# Patient Record
Sex: MALE | Race: WHITE | Employment: PART TIME | URBAN - METROPOLITAN AREA
[De-identification: names, ages, dates, MRNs, and addresses within clinical notes are randomized per-mention and may not be internally consistent; named-entity substitution may affect disease eponyms.]

---

## 2013-10-21 LAB — COLONOSCOPY, EXTERNAL: NORMAL

## 2017-02-06 ENCOUNTER — OFFICE VISIT (OUTPATIENT)
Dept: INTERNAL MEDICINE CLINIC | Age: 67
End: 2017-02-06

## 2017-02-06 VITALS
DIASTOLIC BLOOD PRESSURE: 90 MMHG | OXYGEN SATURATION: 96 % | BODY MASS INDEX: 30.7 KG/M2 | TEMPERATURE: 97.9 F | RESPIRATION RATE: 18 BRPM | SYSTOLIC BLOOD PRESSURE: 162 MMHG | WEIGHT: 195.6 LBS | HEIGHT: 67 IN | HEART RATE: 62 BPM

## 2017-02-06 DIAGNOSIS — I83.93 VARICOSE VEINS OF BOTH LOWER EXTREMITIES: ICD-10-CM

## 2017-02-06 DIAGNOSIS — S91.001A ANKLE WOUND, RIGHT, INITIAL ENCOUNTER: Primary | ICD-10-CM

## 2017-02-06 DIAGNOSIS — I10 ESSENTIAL HYPERTENSION: ICD-10-CM

## 2017-02-06 RX ORDER — MUPIROCIN 20 MG/G
OINTMENT TOPICAL DAILY
Qty: 22 G | Refills: 0 | Status: SHIPPED | OUTPATIENT
Start: 2017-02-06 | End: 2017-03-03 | Stop reason: ALTCHOICE

## 2017-02-06 RX ORDER — DOXYCYCLINE 100 MG/1
100 TABLET ORAL 2 TIMES DAILY
Qty: 14 TAB | Refills: 0 | Status: SHIPPED | OUTPATIENT
Start: 2017-02-06 | End: 2017-02-13

## 2017-02-06 NOTE — PROGRESS NOTES
HISTORY OF PRESENT ILLNESS  Priscilla Eller is a 74 y.o. male. Ankle Pain    The current episode started more than 1 week ago. The problem has not changed since onset. The pain is present in the right ankle. The quality of the pain is described as aching. The pain is at a severity of 3/10. Pertinent negatives include no numbness, full range of motion, no stiffness and no tingling. The symptoms are aggravated by standing. He has tried OTC ointments for the symptoms. The treatment provided mild relief. There has been no history of extremity trauma. Cardiovascular Review:  The patient has hypertension. Diet and Lifestyle: not attempting to follow a low sodium diet, had a lot of salty food during [employer] party yesterday  Home BP Monitoring: is well controlled at home, but not measured in the last week. Pertinent ROS: taking medications as instructed, no medication side effects noted, no TIA's, no chest pain on exertion, no dyspnea on exertion, no swelling of ankles. Review of Systems   Musculoskeletal: Negative for stiffness. Neurological: Negative for tingling and numbness. Patient Active Problem List    Diagnosis Date Noted    Advance directive discussed with patient 11/10/2016    OA (osteoarthritis) 08/19/2011    HTN (hypertension) 02/01/2011       Current Outpatient Prescriptions   Medication Sig Dispense Refill    doxycycline (ADOXA) 100 mg tablet Take 1 Tab by mouth two (2) times a day for 7 days. 14 Tab 0    mupirocin (BACTROBAN) 2 % ointment Apply  to affected area daily. 22 g 0    lisinopril (PRINIVIL, ZESTRIL) 5 mg tablet Take 1 Tab by mouth two (2) times a day. (Patient taking differently: Take 5 mg by mouth daily. ) 180 Tab 1    sildenafil citrate (VIAGRA) 100 mg tablet Take 1 Tab by mouth as needed. 10 Tab 5    cyanocobalamin (VITAMIN B12) 100 mcg tablet Take 100 mcg by mouth daily.  OTHER Osteomatric      multivitamin (ONE A DAY) tablet Take 1 Tab by mouth daily.       aspirin delayed-release 81 mg tablet Take 81 mg by mouth daily.  lisinopril (PRINIVIL, ZESTRIL) 10 mg tablet Take 0.5 Tabs by mouth two (2) times a day. 180 Tab 1       No Known Allergies   Visit Vitals    /90 (BP 1 Location: Right arm, BP Patient Position: Sitting)    Pulse 62    Temp 97.9 °F (36.6 °C) (Oral)    Resp 18    Ht 5' 6.5\" (1.689 m)    Wt 195 lb 9.6 oz (88.7 kg)    SpO2 96%    BMI 31.1 kg/m2       Physical Exam   Constitutional: He is oriented to person, place, and time. No distress. Neurological: He is alert and oriented to person, place, and time. Psychiatric: He has a normal mood and affect. RLE(shown above): Granulated ankle wound with ttp and mild erythema as depicted. BLE with varicose and spider veins. ASSESSMENT and PLAN  Joie Castleman was seen today for ankle pain. Diagnoses and all orders for this visit:    Ankle wound, right, initial encounter- poor circulation and local contributing to slow healing. Xray to r/o osteomyelitis Would benefit from Wound Care clinic evaluation to optimize underlying vein disease.   -     XR ANKLE RT MIN 3 V; Future  -     REFERRAL TO WOUND CARE  -     doxycycline (ADOXA) 100 mg tablet; Take 1 Tab by mouth two (2) times a day for 7 days.  -     mupirocin (BACTROBAN) 2 % ointment; Apply  to affected area daily. Varicose veins of both lower extremities  -     XR ANKLE RT MIN 3 V; Future    Essential hypertension- sBP elevated due to dietary indiscretion. Plans to resume pm dose of Lisinopril. Monitor BP at home. Red flags to warrant ER or earlier clinical evaluation reviewed. See AVS for full details of plan and patient discussion. Follow-up Disposition:  Return in about 2 weeks (around 2/20/2017) for Follow-up with Dr. Jessica Back, Hypertension. Medication risks/benefits/costs/interactions/alternatives discussed with patient.   Marilee Lissa  was given an after visit summary which includes diagnoses, current medications, & vitals. he expressed understanding with the diagnosis and plan.

## 2017-02-06 NOTE — MR AVS SNAPSHOT
Visit Information Date & Time Provider Department Dept. Phone Encounter #  
 2/6/2017 11:45 AM Madai Stover MD [address] [id number] Internal Medicine [phone] [id number] Follow-up Instructions Return in about 2 weeks (around 2/20/2017) for Follow-up with Dr. Tasia Griggs, Hypertension. Upcoming Health Maintenance Date Due ZOSTER VACCINE AGE 60> 3/23/2008 Pneumococcal 65+ Low/Medium Risk (2 of 2 - PCV13) 10/9/2014 GLAUCOMA SCREENING Q2Y 7/1/2016 INFLUENZA AGE 9 TO ADULT 8/1/2016 MEDICARE YEARLY EXAM 11/11/2017 DTaP/Tdap/Td series (2 - Td) 8/20/2022 COLONOSCOPY 10/22/2023 Allergies as of 2/6/2017  Review Complete On: 2/6/2017 By: Madai Stover MD  
 No Known Allergies Current Immunizations  Reviewed on 11/10/2016 Name Date Influenza High Dose Vaccine PF 11/9/2015, 10/9/2013 Influenza Vaccine 11/11/2014 Influenza Vaccine Whole 11/12/2012 Pneumococcal Polysaccharide (PPSV-23) 10/9/2013 TDAP Vaccine 8/20/2012 Not reviewed this visit You Were Diagnosed With   
  
 Codes Comments Ankle wound, right, initial encounter    -  Primary ICD-10-CM: S91.001A ICD-9-CM: 891.0 Varicose veins of both lower extremities     ICD-10-CM: I83.93 
ICD-9-CM: 454.9 Essential hypertension     ICD-10-CM: I10 
ICD-9-CM: 401.9 Vitals BP Pulse Temp Resp Height(growth percentile) Weight(growth percentile) 162/90 (BP 1 Location: Right arm, BP Patient Position: Sitting) 62 97.9 °F (36.6 °C) (Oral) 18 5' 6.5\" (1.689 m) 195 lb 9.6 oz (88.7 kg) SpO2 BMI Smoking Status 96% 31.1 kg/m2 Never Smoker Vitals History BMI and BSA Data Body Mass Index Body Surface Area  
 31.1 kg/m 2 2.04 m 2 Preferred Pharmacy Pharmacy Name Phone [facility] [place], [place] - [address] [place] [address] [phone] Your Updated Medication List  
  
   
 This list is accurate as of: 2/6/17 12:33 PM.  Always use your most recent med list.  
  
  
  
  
 aspirin delayed-release 81 mg tablet Take 81 mg by mouth daily. cyanocobalamin 100 mcg tablet Commonly known as:  VITAMIN B12 Take 100 mcg by mouth daily. doxycycline 100 mg tablet Commonly known as:  ADOXA Take 1 Tab by mouth two (2) times a day for 7 days. * lisinopril 10 mg tablet Commonly known as:  Kaylynn Fernando Take 0.5 Tabs by mouth two (2) times a day. * lisinopril 5 mg tablet Commonly known as:  Kaylynn Fernando Take 1 Tab by mouth two (2) times a day. multivitamin tablet Commonly known as:  ONE A DAY Take 1 Tab by mouth daily. mupirocin 2 % ointment Commonly known as:  [facility] Apply  to affected area daily. OTHER Osteomatric  
  
 sildenafil citrate 100 mg tablet Commonly known as:  VIAGRA Take 1 Tab by mouth as needed. * Notice: This list has 2 medication(s) that are the same as other medications prescribed for you. Read the directions carefully, and ask your doctor or other care provider to review them with you. Prescriptions Sent to Pharmacy Refills  
 doxycycline (ADOXA) 100 mg tablet 0 Sig: Take 1 Tab by mouth two (2) times a day for 7 days. Class: Normal  
 Pharmacy: [facility] [address] [place] [address] Ph #: [phone] Route: Oral  
 mupirocin (BACTROBAN) 2 % ointment 0 Sig: Apply  to affected area daily. Class: Normal  
 Pharmacy: [id number] [place] [address] Ph #: [phone] Route: Topical  
  
We Performed the Following REFERRAL TO WOUND CARE [[id number] Custom] Comments:  
 Please evaluate patient for non healing ankle wound Follow-up Instructions Return in about 2 weeks (around 2/20/2017) for Follow-up with Dr. Seymour Mast, Hypertension. To-Do List   
 02/06/2017   Imaging:  XR ANKLE RT MIN 3 V   
  
  
 Referral Information Referral ID Referred By Referred To  
  
 [id number] DENY, [address] Fabien Sharma MD   
   [address] [facility] [address] [place], [address] Phone: [phone] Fax: [phone] Visits Status Start Date End Date 1 New Request 2/6/17 2/6/18 If your referral has a status of pending review or denied, additional information will be sent to support the outcome of this decision. Patient Instructions It was a pleasure to see you! As discussed: 
 
I have ordered an xray to ensure the wound has not impacted the bone You will be called by the wound clinic to arrange an appointment For now follow the treatment discussed. Blood pressure - Your blood pressure was slightly high today  
- Since you told me your blood pressure is lower at home. Keep a log of your blood pressure every day. -Bring your blood pressure monitor to your next appointment.  
-Continue to follow a low salt/ low sodium diet (1500mg/ day) -If your blood pressure is too low (<90/60) or too high (>180/100) or you have any symptoms such as chest pain, dizziness, shortness of breath- seek immediate medical attention.  
- Take Tylenol instead of ibuprofen or other NSAIDs for pain Wound Care: After Your Visit Your Care Instructions Taking good care of your wound at home will help it heal quickly and reduce your chance of infection. The doctor has checked you carefully, but problems can develop later. If you notice any problems or new symptoms, get medical treatment right away. Follow-up care is a key part of your treatment and safety. Be sure to make and go to all appointments, and call your doctor if you are having problems. It's also a good idea to know your test results and keep a list of the medicines you take. How can you care for yourself at home?  
· Clean the area with soap and water 2 times a day unless your doctor gives you different instructions. Don't use hydrogen peroxide or alcohol, which can slow healing. ¨ You may cover the wound with a thin layer of antibiotic ointment, such as bacitracin, and a nonstick bandage. ¨ Apply more ointment and replace the bandage as needed. · Take pain medicines exactly as directed. Some pain is normal with a wound, but do not ignore pain that is getting worse instead of better. You could have an infection. ¨ If the doctor gave you a prescription medicine for pain, take it as prescribed. ¨ If you are not taking a prescription pain medicine, ask your doctor if you can take an over-the-counter medicine. · Your doctor may have closed your wound with stitches (sutures), staples, or skin glue. ¨ If you have stitches, your doctor may remove them after several days to 2 weeks. Or you may have stitches that dissolve on their own. ¨ If you have staples, your doctor may remove them after 7 to 10 days. ¨ If your wound was closed with skin glue, the glue will wear off in a few days to 2 weeks. When should you call for help? Call your doctor now or seek immediate medical care if: 
· You have signs of infection, such as: 
¨ Increased pain, swelling, warmth, or redness near the wound. ¨ Red streaks leading from the wound. ¨ Pus draining from the wound. ¨ A fever. · You bleed so much from your incision that you soak one or more bandages over 2 to 4 hours. Watch closely for changes in your health, and be sure to contact your doctor if: · The wound is not getting better each day. Where can you learn more? Go to [employer].be Enter A989 in the search box to learn more about \"Wound Care: After Your Visit. \"  
© 4621-6587 Healthwise, Incorporated. Care instructions adapted under license by Annie Pierce (which disclaims liability or warranty for this information).  This care instruction is for use with your licensed healthcare professional. If you have questions about a medical condition or this instruction, always ask your healthcare professional. [address] any warranty or liability for your use of this information. Content Version: 37.2.229149; Last Revised: April 23, 2012 Introducing hospitals & HEALTH SERVICES! Dear Lisha Hess: 
Thank you for requesting a Riffyn account. Our records indicate that you already have an active Riffyn account. You can access your account anytime at [email or web address] Did you know that you can access your hospital and ER discharge instructions at any time in Riffyn? You can also review all of your test results from your hospital stay or ER visit. Additional Information If you have questions, please visit the Frequently Asked Questions section of the Riffyn website at [email or web address] Remember, Riffyn is NOT to be used for urgent needs. For medical emergencies, dial 911. Now available from your iPhone and Android! Please provide this summary of care documentation to your next provider. Your primary care clinician is listed as [phone] If you have any questions after today's visit, please call [phone].

## 2017-02-06 NOTE — PATIENT INSTRUCTIONS
It was a pleasure to see you! As discussed:    I have ordered an xray to ensure the wound has not impacted the bone  You will be called by the wound clinic to arrange an appointment  For now follow the treatment discussed. Blood pressure  - Your blood pressure was slightly high today   - Since you told me your blood pressure is lower at home. Keep a log of your blood pressure every day. -Bring your blood pressure monitor to your next appointment.   -Continue to follow a low salt/ low sodium diet (1500mg/ day)  -If your blood pressure is too low (<90/60) or too high (>180/100) or you have any symptoms such as chest pain, dizziness, shortness of breath- seek immediate medical attention.   - Take Tylenol instead of ibuprofen or other NSAIDs for pain    Wound Care: After Your Visit  Your Care Instructions  Taking good care of your wound at home will help it heal quickly and reduce your chance of infection. The doctor has checked you carefully, but problems can develop later. If you notice any problems or new symptoms, get medical treatment right away. Follow-up care is a key part of your treatment and safety. Be sure to make and go to all appointments, and call your doctor if you are having problems. It's also a good idea to know your test results and keep a list of the medicines you take. How can you care for yourself at home? · Clean the area with soap and water 2 times a day unless your doctor gives you different instructions. Don't use hydrogen peroxide or alcohol, which can slow healing. ¨ You may cover the wound with a thin layer of antibiotic ointment, such as bacitracin, and a nonstick bandage. ¨ Apply more ointment and replace the bandage as needed. · Take pain medicines exactly as directed. Some pain is normal with a wound, but do not ignore pain that is getting worse instead of better. You could have an infection.   ¨ If the doctor gave you a prescription medicine for pain, take it as prescribed. ¨ If you are not taking a prescription pain medicine, ask your doctor if you can take an over-the-counter medicine. · Your doctor may have closed your wound with stitches (sutures), staples, or skin glue. ¨ If you have stitches, your doctor may remove them after several days to 2 weeks. Or you may have stitches that dissolve on their own. ¨ If you have staples, your doctor may remove them after 7 to 10 days. ¨ If your wound was closed with skin glue, the glue will wear off in a few days to 2 weeks. When should you call for help? Call your doctor now or seek immediate medical care if:  · You have signs of infection, such as:  ¨ Increased pain, swelling, warmth, or redness near the wound. ¨ Red streaks leading from the wound. ¨ Pus draining from the wound. ¨ A fever. · You bleed so much from your incision that you soak one or more bandages over 2 to 4 hours. Watch closely for changes in your health, and be sure to contact your doctor if:  · The wound is not getting better each day. Where can you learn more? Go to [employer].be  Enter M973 in the search box to learn more about \"Wound Care: After Your Visit. \"   © 5910-3996 Healthwise, Incorporated. Care instructions adapted under license by [employer] (which disclaims liability or warranty for this information). This care instruction is for use with your licensed healthcare professional. If you have questions about a medical condition or this instruction, always ask your healthcare professional. [place] [address] any warranty or liability for your use of this information. Content Version: 55.5.098438;  Last Revised: April 23, 2012

## 2017-02-07 ENCOUNTER — HOSPITAL ENCOUNTER (OUTPATIENT)
Dept: GENERAL RADIOLOGY | Age: 67
Discharge: HOME | End: 2017-02-07
Payer: MEDICARE

## 2017-02-07 DIAGNOSIS — I83.93 VARICOSE VEINS OF BOTH LOWER EXTREMITIES: ICD-10-CM

## 2017-02-07 DIAGNOSIS — S91.001A ANKLE WOUND, RIGHT, INITIAL ENCOUNTER: ICD-10-CM

## 2017-02-07 PROCEDURE — 73610 X-RAY EXAM OF ANKLE: CPT

## 2017-02-08 NOTE — PROGRESS NOTES
Please let him know xray is normal  ---  Dear Gerber Leong   Thank you for completing your xray. No infection of the bone was seen. Continue the plan we discussed. Including scheduling with Wound Care Clinic. Do not hesitate to contact the office if you have any questions or concerns before your next appointment.    Kind regards,   Dr. Tatum Vila

## 2017-03-03 ENCOUNTER — OFFICE VISIT (OUTPATIENT)
Dept: INTERNAL MEDICINE CLINIC | Age: 67
End: 2017-03-03

## 2017-03-03 ENCOUNTER — TELEPHONE (OUTPATIENT)
Dept: INTERNAL MEDICINE CLINIC | Age: 67
End: 2017-03-03

## 2017-03-03 VITALS
WEIGHT: 194.4 LBS | DIASTOLIC BLOOD PRESSURE: 106 MMHG | HEART RATE: 59 BPM | TEMPERATURE: 98.1 F | SYSTOLIC BLOOD PRESSURE: 168 MMHG | OXYGEN SATURATION: 98 % | HEIGHT: 67 IN | BODY MASS INDEX: 30.51 KG/M2 | RESPIRATION RATE: 10 BRPM

## 2017-03-03 DIAGNOSIS — Z23 ENCOUNTER FOR IMMUNIZATION: ICD-10-CM

## 2017-03-03 DIAGNOSIS — R00.1 BRADYCARDIA: ICD-10-CM

## 2017-03-03 DIAGNOSIS — Z23 NEED FOR SHINGLES VACCINE: Primary | ICD-10-CM

## 2017-03-03 RX ORDER — CHOLECALCIFEROL (VITAMIN D3) 125 MCG
CAPSULE ORAL
COMMUNITY
End: 2018-05-17 | Stop reason: SDUPTHER

## 2017-03-03 NOTE — MR AVS SNAPSHOT
Visit Information Date & Time Provider Department Dept. Phone Encounter #  
 3/3/2017  8:30 AM Jose Esteves MD [facility] Internal Medicine [phone] [id number] Upcoming Health Maintenance Date Due ZOSTER VACCINE AGE 60> 3/23/2008 Pneumococcal 65+ Low/Medium Risk (2 of 2 - PCV13) 10/9/2014 GLAUCOMA SCREENING Q2Y 7/1/2016 MEDICARE YEARLY EXAM 11/11/2017 DTaP/Tdap/Td series (2 - Td) 8/20/2022 COLONOSCOPY 10/22/2023 Allergies as of 3/3/2017  Review Complete On: 3/3/2017 By: Justin Mullins LPN No Known Allergies Current Immunizations  Reviewed on 11/10/2016 Name Date Influenza High Dose Vaccine PF 11/9/2015, 10/9/2013 Influenza Vaccine 11/11/2014 Influenza Vaccine Whole 11/12/2012 Pneumococcal Polysaccharide (PPSV-23) 10/9/2013 TDAP Vaccine 8/20/2012 Not reviewed this visit You Were Diagnosed With   
  
 Codes Comments Need for shingles vaccine    -  Primary ICD-10-CM: U10 ICD-9-CM: V04.89 Encounter for immunization     ICD-10-CM: I66 ICD-9-CM: V03.89 Bradycardia     ICD-10-CM: R00.1 ICD-9-CM: 427.89 Vitals BP  
  
  
  
  
  
 (!) 168/106 Vitals History BMI and BSA Data Body Mass Index Body Surface Area 30.91 kg/m 2 2.03 m 2 Preferred Pharmacy Pharmacy Name Phone [facility] [address] [place], [place] - [address] [place] [address] José Miguel Veloz [phone] Your Updated Medication List  
  
   
This list is accurate as of: 3/3/17  9:19 AM.  Always use your most recent med list.  
  
  
  
  
 aspirin delayed-release 81 mg tablet Take 81 mg by mouth daily. cyanocobalamin 100 mcg tablet Commonly known as:  VITAMIN B12 Take 100 mcg by mouth daily. lisinopril 5 mg tablet Commonly known as:  Norma Magdiel Take 1 Tab by mouth two (2) times a day. multivitamin tablet Commonly known as:  ONE A DAY Take 1 Tab by mouth daily. OTHER Osteomatric pneumococcal 13 mary conj dip 0.5 mL Syrg injection Commonly known as:  PREVNAR-13  
0.5 mL by IntraMUSCular route once for 1 dose. Indications: PREVENTION OF STREPTOCOCCUS PNEUMONIAE INFECTION  
  
 sildenafil citrate 100 mg tablet Commonly known as:  VIAGRA Take 1 Tab by mouth as needed. varicella zoster vacine live 19,400 unit/0.65 mL Susr injection Commonly known as:  ZOSTAVAX  
1 Vial by SubCUTAneous route once for 1 dose. Indications: PREVENTION OF HERPES ZOSTER  
  
 VITAMIN D3 2,000 unit Tab Generic drug:  cholecalciferol (vitamin D3) Take  by mouth. Prescriptions Printed Refills  
 varicella zoster vacine live (ZOSTAVAX) 19,400 unit/0.65 mL susr injection 0 Si Vial by SubCUTAneous route once for 1 dose. Indications: PREVENTION OF HERPES ZOSTER Class: Print Route: SubCUTAneous  
 pneumococcal 13 mary conj dip (PREVNAR-13) 0.5 mL syrg injection 0 Si.5 mL by IntraMUSCular route once for 1 dose. Indications: PREVENTION OF STREPTOCOCCUS PNEUMONIAE INFECTION Class: Print Route: IntraMUSCular We Performed the Following AMB POC EKG ROUTINE W/ 12 LEADS, INTER & REP [62296 CPT(R)] Patient Instructions As discussed in your appointment today, [address] is an important part of planning for your healthcare future. Discussing your preferences with your family and your care team is a part of good healthcare so that we can be guided by your known values and goals. Our office offers this service for you at your convenience. Our Nurse Navigators and certified Respecting Choices ® Facilitators, Collette Urbina and Eulalia Mack typically schedule family appointments for this service on . To schedule an Advance Care Planning visit or to receive more information about this service, please call [facility] Internal Medicine at [phone] and ask to speak directly to Dagoberto Halsted or [employer] Advance Care Planning: Care Instructions Your Care Instructions It can be hard to live with an illness that cannot be cured. But if your health is getting worse, you may want to make decisions about end-of-life care. Planning for the end of your life does not mean that you are giving up. It is a way to make sure that your wishes are met. Clearly stating your wishes can make it easier for your loved ones. Making plans while you are still able may also ease your mind and make your final days less stressful and more meaningful. Follow-up care is a key part of your treatment and safety. Be sure to make and go to all appointments, and call your doctor if you are having problems. It's also a good idea to know your test results and keep a list of the medicines you take. What can you do to plan for the end of life? · You can bring these issues up with your doctor. You do not need to wait until your doctor starts the conversation. You might start with \"I would not be willing to live with . Kait Barrio Kait Barrio Kait Barrio \" When you complete this sentence it helps your doctor understand your wishes. · Talk openly and honestly with your doctor. This is the best way to understand the decisions you will need to make as your health changes. Know that you can always change your mind. · Ask your doctor about commonly used life-support measures. These include tube feedings, breathing machines, and fluids given through a vein (IV). Understanding these treatments will help you decide whether you want them. · You may choose to have these life-supporting treatments for a limited time. This allows a trial period to see whether they will help you. You may also decide that you want your doctor to take only certain measures to keep you alive. It is important to spell out these conditions so that your doctor and family understand them. · Talk to your doctor about how long you are likely to live.  He or she may be able to give you an idea of what usually happens with your specific illness. · Think about preparing papers that state your wishes. This way there will not be any confusion about what you want. You can change your instructions at any time. Which papers should you prepare? Advance directives are legal papers that tell doctors how you want to be cared for at the end of your life. You do not need a  to write these papers. Ask your doctor or your state [facility] department for information on how to write your advance directives. They may have the forms for each of these types of papers. Make sure your doctor has a copy of these on file, and give a copy to a family member or close friend. · Consider a do-not-resuscitate order (DNR). This order asks that no extra treatments be done if your heart stops or you stop breathing. Extra treatments may include electrical shock to restart your heart or a machine to breathe for you. If you decide to have a DNR order, ask your doctor to explain and write it. Place the order in your home where everyone can easily see it. · Consider a living will. A living will explains your wishes in case you are in a coma or cannot communicate. Living ignacio tell doctors to use or not use treatments that would keep you alive. You must have one or two witnesses or a notary present when you sign this form. · Consider a durable power of . This allows you to name a person to make decisions about your care if you are not able to. Most people ask a close friend or family member. Talk to this person about the kinds of treatments you want and those that you do not want. Make sure this person understands your wishes. If this person is not the health care agent named in your advance directive, also talk with your health care agent. These legal papers are simple to change. Tell your doctor what you want to change, and ask him or her to make a note in your medical file.  Give your family updated copies of the papers. Please wash ankle daily with Dial soap and pat dry. SMALL amount of neosporin on the scabbed area and use cortaid 1% on the dry areas of skin. Wrap with an ACE bandage and use a compression stocking as much as possible. Introducing [facility] & HEALTH SERVICES! Dear Jo Ann Marshall: 
Thank you for requesting a Houserie account. Our records indicate that you already have an active Houserie account. You can access your account anytime at [email or web address] ([email or web address]) Did you know that you can access your hospital and ER discharge instructions at any time in Houserie? You can also review all of your test results from your hospital stay or ER visit. Additional Information If you have questions, please visit the Frequently Asked Questions section of the Houserie website at [email or web address]. Remember, Houserie is NOT to be used for urgent needs. For medical emergencies, dial 911. Now available from your iPhone and Android! Please provide this summary of care documentation to your next provider. Your primary care clinician is listed as Pavan [phone] If you have any questions after today's visit, please call [phone].

## 2017-03-03 NOTE — ACP (ADVANCE CARE PLANNING)
End of life planning discussed with patient. Patient states that they do not have an advance care plan at this time. Information has been provided in today's after visit summary with directions on how to contact our office to schedule an appointment with our nurse navigators for this service. Patient verbalized understanding. Patient does not have a document but will be given info to do one with the NN.

## 2017-03-03 NOTE — PATIENT INSTRUCTIONS
As discussed in your appointment today, [address] is an important part of planning for your healthcare future. Discussing your preferences with your family and your care team is a part of good healthcare so that we can be guided by your known values and goals. Our office offers this service for you at your convenience. Our Nurse Navigators and certified Respecting Choices ® Facilitators, Linda Mcghee and Loi Ziegler typically schedule family appointments for this service on Wednesdays. To schedule an Advance Care Planning visit or to receive more information about this service, please call [facility] Internal Medicine at [phone] and ask to speak directly to Abiodun Garrido or [employer]. Advance Care Planning: Care Instructions  Your Care Instructions  It can be hard to live with an illness that cannot be cured. But if your health is getting worse, you may want to make decisions about end-of-life care. Planning for the end of your life does not mean that you are giving up. It is a way to make sure that your wishes are met. Clearly stating your wishes can make it easier for your loved ones. Making plans while you are still able may also ease your mind and make your final days less stressful and more meaningful. Follow-up care is a key part of your treatment and safety. Be sure to make and go to all appointments, and call your doctor if you are having problems. It's also a good idea to know your test results and keep a list of the medicines you take. What can you do to plan for the end of life? · You can bring these issues up with your doctor. You do not need to wait until your doctor starts the conversation. You might start with \"I would not be willing to live with . Loletta Nunnery Loletta Nunnery Loletta Nunnery \" When you complete this sentence it helps your doctor understand your wishes. · Talk openly and honestly with your doctor. This is the best way to understand the decisions you will need to make as your health changes.  Know that you can always change your mind. · Ask your doctor about commonly used life-support measures. These include tube feedings, breathing machines, and fluids given through a vein (IV). Understanding these treatments will help you decide whether you want them. · You may choose to have these life-supporting treatments for a limited time. This allows a trial period to see whether they will help you. You may also decide that you want your doctor to take only certain measures to keep you alive. It is important to spell out these conditions so that your doctor and family understand them. · Talk to your doctor about how long you are likely to live. He or she may be able to give you an idea of what usually happens with your specific illness. · Think about preparing papers that state your wishes. This way there will not be any confusion about what you want. You can change your instructions at any time. Which papers should you prepare? Advance directives are legal papers that tell doctors how you want to be cared for at the end of your life. You do not need a  to write these papers. Ask your doctor or your state health department for information on how to write your advance directives. They may have the forms for each of these types of papers. Make sure your doctor has a copy of these on file, and give a copy to a family member or close friend. · Consider a do-not-resuscitate order (DNR). This order asks that no extra treatments be done if your heart stops or you stop breathing. Extra treatments may include electrical shock to restart your heart or a machine to breathe for you. If you decide to have a DNR order, ask your doctor to explain and write it. Place the order in your home where everyone can easily see it. · Consider a living will. A living will explains your wishes in case you are in a coma or cannot communicate. Living ignacio tell doctors to use or not use treatments that would keep you alive.  You must have one or two witnesses or a notary present when you sign this form. · Consider a durable power of . This allows you to name a person to make decisions about your care if you are not able to. Most people ask a close friend or family member. Talk to this person about the kinds of treatments you want and those that you do not want. Make sure this person understands your wishes. If this person is not the health care agent named in your advance directive, also talk with your health care agent. These legal papers are simple to change. Tell your doctor what you want to change, and ask him or her to make a note in your medical file. Give your family updated copies of the papers. Please wash ankle daily with Dial soap and pat dry. SMALL amount of neosporin on the scabbed area and use cortaid 1% on the dry areas of skin. Wrap with an ACE bandage and use a compression stocking as much as possible.

## 2017-03-03 NOTE — PROGRESS NOTES
Chief Complaint   Patient presents with    Hypertension     Goals that were addressed/or need to be completed after this visit:  Health Maintenance Due   Topic    ZOSTER VACCINE AGE 60>     Pneumococcal 65+ Low/Medium Risk (2 of 2 - PCV13)    GLAUCOMA SCREENING Q2Y      Faxed request to Dr. Jarrell Rdz office requesting patients most recent glaucoma screening to be faxed to our office. Fax confirmation received.

## 2017-03-03 NOTE — PROGRESS NOTES
HPI:  Jered Blevins is a 74y.o. year old male who is here for a routine visit:    BP has been elevated and he has increased the lisinopril to 7.5 mg. No headache or dizziness. No chest pain or SOB. His ankle wound is better with compression but still painful. No fever or chills or sweats. Heart rate has been as low as 47 with no symptoms. BP max was 157 at home. Past Medical History:   Diagnosis Date    Hypertension     OA (osteoarthritis) 8/19/2011       Past Surgical History:   Procedure Laterality Date    HX CATARACT REMOVAL  1993    left     HX HIP REPLACEMENT  1/1999    Left    HX HIP REPLACEMENT  6/2000 and 10/2004    Right times 2    MT ANESTH,KNEE AREA SURGERY  1966       Prior to Admission medications    Medication Sig Start Date End Date Taking? Authorizing Provider   varicella zoster vacine live (ZOSTAVAX) 19,400 unit/0.65 mL susr injection 1 Vial by SubCUTAneous route once for 1 dose. Indications: PREVENTION OF HERPES ZOSTER 3/3/17 3/3/17 Yes Edgardo Sanchez MD   pneumococcal 13 mary conj dip (PREVNAR-13) 0.5 mL syrg injection 0.5 mL by IntraMUSCular route once for 1 dose. Indications: PREVENTION OF STREPTOCOCCUS PNEUMONIAE INFECTION 3/3/17 3/3/17 Yes Edgardo Sanchez MD   cholecalciferol, vitamin D3, (VITAMIN D3) 2,000 unit tab Take  by mouth. Yes Historical Provider   lisinopril (PRINIVIL, ZESTRIL) 5 mg tablet Take 1 Tab by mouth two (2) times a day. Patient taking differently: Take 5 mg by mouth daily. 10/4/16  Yes Bernardo Miller III, MD   cyanocobalamin (VITAMIN B12) 100 mcg tablet Take 100 mcg by mouth daily. Yes Historical Provider   OTHER Osteomatric   Yes Historical Provider   aspirin delayed-release 81 mg tablet Take 81 mg by mouth daily. Yes Historical Provider   sildenafil citrate (VIAGRA) 100 mg tablet Take 1 Tab by mouth as needed. 4/22/16   Bernardo Miller III, MD   multivitamin (ONE A DAY) tablet Take 1 Tab by mouth daily.     Historical Provider       Social History     Social History    Marital status:      Spouse name: N/A    Number of children: N/A    Years of education: N/A     Occupational History    Not on file. Social History Main Topics    Smoking status: Never Smoker    Smokeless tobacco: Never Used    Alcohol use 1.0 oz/week     2 Standard drinks or equivalent per week      Comment: socially     Drug use: No    Sexual activity: Yes     Partners: Female     Other Topics Concern    Not on file     Social History Narrative          ROS  Per HPI    Visit Vitals    BP (!) 168/106    Pulse (!) 59    Temp 98.1 °F (36.7 °C) (Oral)    Resp 10    Ht 5' 6.5\" (1.689 m)    Wt 194 lb 6.4 oz (88.2 kg)    SpO2 98%    BMI 30.91 kg/m2         Physical Exam   Physical Examination: General appearance - alert, well appearing, and in no distress  Mouth - mucous membranes moist, pharynx normal without lesions  Neck - supple, no significant adenopathy  Chest - clear to auscultation, no wheezes, rales or rhonchi, symmetric air entry  Heart - normal rate, regular rhythm, normal S1, S2, no murmurs, rubs, clicks or gallops  Abdomen - soft, nontender, nondistended, no masses or organomegaly  Extremities - Right lower leg with small scabbed ulcer and and some surrounding dry skin. Extensive varicose veins and 2 plus edema. Assessment/Plan:  Laymariana Schaefer was seen today for hypertension. Diagnoses and all orders for this visit:    Need for shingles vaccine    Encounter for immunization    Bradycardia - sinus bradycardia  -     AMB POC EKG ROUTINE W/ 12 LEADS, INTER & REP    Other orders  -     varicella zoster vacine live (ZOSTAVAX) 19,400 unit/0.65 mL susr injection; 1 Vial by SubCUTAneous route once for 1 dose. Indications: PREVENTION OF HERPES ZOSTER  -     pneumococcal 13 mary conj dip (PREVNAR-13) 0.5 mL syrg injection; 0.5 mL by IntraMUSCular route once for 1 dose.  Indications: PREVENTION OF STREPTOCOCCUS PNEUMONIAE INFECTION  HTN - BP meds increased to 5 mg BID and followup BP daily  Leg ulcer -   Please wash ankle daily with Dial soap and pat dry. SMALL amount of neosporin on the scabbed area and use cortaid 1% on the dry areas of skin. Wrap with an ACE bandage and use a compression stocking as much as possible. Follow-up Disposition: 1 month       Advised him to call back or return to office if symptoms worsen/change/persist.  Discussed expected course/resolution/complications of diagnosis in detail with patient. Medication risks/benefits/costs/interactions/alternatives discussed with patient. He was given an after visit summary which includes diagnoses, current medications, & vitals. He expressed understanding with the diagnosis and plan.

## 2017-04-07 ENCOUNTER — OFFICE VISIT (OUTPATIENT)
Dept: INTERNAL MEDICINE CLINIC | Age: 67
End: 2017-04-07

## 2017-04-07 VITALS
HEIGHT: 67 IN | SYSTOLIC BLOOD PRESSURE: 146 MMHG | DIASTOLIC BLOOD PRESSURE: 90 MMHG | RESPIRATION RATE: 17 BRPM | TEMPERATURE: 97.5 F | WEIGHT: 191.2 LBS | HEART RATE: 55 BPM | BODY MASS INDEX: 30.01 KG/M2 | OXYGEN SATURATION: 100 %

## 2017-04-07 DIAGNOSIS — L97.319 STASIS ULCER OF ANKLE, RIGHT (HCC): ICD-10-CM

## 2017-04-07 DIAGNOSIS — I83.013 STASIS ULCER OF ANKLE, RIGHT (HCC): ICD-10-CM

## 2017-04-07 DIAGNOSIS — I10 ESSENTIAL HYPERTENSION: Primary | ICD-10-CM

## 2017-04-07 RX ORDER — VALSARTAN 160 MG/1
160 TABLET ORAL DAILY
Qty: 30 TAB | Refills: 3 | Status: SHIPPED | OUTPATIENT
Start: 2017-04-07 | End: 2017-08-04 | Stop reason: SDUPTHER

## 2017-04-07 RX ORDER — TRIAMCINOLONE ACETONIDE 1 MG/G
CREAM TOPICAL
Qty: 45 G | Refills: 0 | Status: SHIPPED | OUTPATIENT
Start: 2017-04-07 | End: 2018-03-01 | Stop reason: ALTCHOICE

## 2017-04-07 NOTE — MR AVS SNAPSHOT
Visit Information Date & Time Provider Department Dept. Phone Encounter #  
 4/7/2017  8:15 AM Luisa Pantoja MD [facility] Internal Medicine [phone] [id number] Your Appointments 11/13/2017  9:30 AM  
PHYSICAL with Luisa Pantoja MD  
[facility] Internal Medicine [facility]) Appt Note: cpe  
 [address] [facility] [address]  
[address] [facility] Upcoming Health Maintenance Date Due ZOSTER VACCINE AGE 60> 3/23/2008 Pneumococcal 65+ Low/Medium Risk (2 of 2 - PCV13) 10/9/2014 MEDICARE YEARLY EXAM 11/11/2017 GLAUCOMA SCREENING Q2Y 1/24/2019 DTaP/Tdap/Td series (2 - Td) 8/20/2022 COLONOSCOPY 10/22/2023 Allergies as of 4/7/2017  Review Complete On: 4/7/2017 By: Luisa Pantoja MD  
 No Known Allergies Current Immunizations  Reviewed on 11/10/2016 Name Date Influenza High Dose Vaccine PF 11/9/2015, 10/9/2013 Influenza Vaccine 11/11/2014 Influenza Vaccine Whole 11/12/2012 Pneumococcal Polysaccharide (PPSV-23) 10/9/2013 TDAP Vaccine 8/20/2012 Not reviewed this visit You Were Diagnosed With   
  
 Codes Comments Essential hypertension    -  Primary ICD-10-CM: I10 
ICD-9-CM: 401.9 Stasis ulcer of ankle, right     ICD-10-CM: I83.013 
ICD-9-CM: 454.0 Vitals BP Pulse Temp Resp Height(growth percentile) Weight(growth percentile) 146/90 (!) 55 97.5 °F (36.4 °C) (Oral) 17 5' 6.5\" (1.689 m) 191 lb 3.2 oz (86.7 kg) SpO2 BMI Smoking Status 100% 30.4 kg/m2 Never Smoker BMI and BSA Data Body Mass Index Body Surface Area  
 30.4 kg/m 2 2.02 m 2 Preferred Pharmacy Pharmacy Name Phone [facility] [address] [place], [place] - [address] [place] [address] [phone] Your Updated Medication List  
  
   
This list is accurate as of: 4/7/17  9:03 AM.  Always use your most recent med list.  
  
  
 aspirin delayed-release 81 mg tablet Take 81 mg by mouth daily. CALCIUM PO Take  by mouth daily. cyanocobalamin 100 mcg tablet Commonly known as:  VITAMIN B12 Take 100 mcg by mouth daily. multivitamin tablet Commonly known as:  ONE A DAY Take 1 Tab by mouth daily. OTHER Osteomatric  
  
 sildenafil citrate 100 mg tablet Commonly known as:  VIAGRA Take 1 Tab by mouth as needed. triamcinolone acetonide 0.1 % topical cream  
Commonly known as:  KENALOG Apply  to affected area nightly. use thin layer  
  
 valsartan 160 mg tablet Commonly known as:  DIOVAN Take 1 Tab by mouth daily. Indications: hypertension VITAMIN D3 2,000 unit Tab Generic drug:  cholecalciferol (vitamin D3) Take  by mouth. Prescriptions Sent to Pharmacy Refills  
 valsartan (DIOVAN) 160 mg tablet 3 Sig: Take 1 Tab by mouth daily. Indications: hypertension Class: Normal  
 Pharmacy: [address], [address] [place] [address] Ph #: [phone] Route: Oral  
 triamcinolone acetonide (KENALOG) 0.1 % topical cream 0 Sig: Apply  to affected area nightly. use thin layer Class: Normal  
 Pharmacy: [address] Ph #: [phone] Route: Topical  
  
We Performed the Following REFERRAL TO CARDIOLOGY [OST64 Custom] Comments:  
 Please evaluate patient for HTN. Referral Information Referral ID Referred By Referred To  
  
 [address] [place], [place] [address]  [address] Phone: [phone] Fax: [phone] Visits Status Start Date End Date 1 New Request 4/7/17 4/7/18 If your referral has a status of pending review or denied, additional information will be sent to support the outcome of this decision. Introducing \[facility]\"" & HEALTH SERVICES!    
 Dear Blaine Schmidt: 
 Thank you for requesting a [employer] account. Our records indicate that you already have an active [employer] account. You can access your account anytime at [email or web address] Did you know that you can access your hospital and ER discharge instructions at any time in [employer]? You can also review all of your test results from your hospital stay or ER visit. Additional Information If you have questions, please visit the Frequently Asked Questions section of the [employer] website at [email or web address] Remember, [employer] is NOT to be used for urgent needs. For medical emergencies, dial 911. Now available from your iPhone and Android! Please provide this summary of care documentation to your next provider. Your primary care clinician is listed as Pavan [phone] If you have any questions after today's visit, please call [phone].

## 2017-04-07 NOTE — PROGRESS NOTES
HPI:  Tayler Park is a 69y.o. year old male who is here for a routine visit:    Has been doing OK. No headache or dizziness. No chest pain or SOB. No cough or wheeze. No change in bowels or bladder. Ulcer on the right ankle is better but still scaly and itches. Past Medical History:   Diagnosis Date    Hypertension     OA (osteoarthritis) 8/19/2011       Past Surgical History:   Procedure Laterality Date    HX CATARACT REMOVAL  1993    left     HX HIP REPLACEMENT  1/1999    Left    HX HIP REPLACEMENT  6/2000 and 10/2004    Right times 2    LA ANESTH,KNEE AREA SURGERY  1966       Prior to Admission medications    Medication Sig Start Date End Date Taking? Authorizing Provider   CALCIUM PO Take  by mouth daily. Yes Historical Provider   valsartan (DIOVAN) 160 mg tablet Take 1 Tab by mouth daily. Indications: hypertension 4/7/17  Yes Nicky Browne III, MD   triamcinolone acetonide (KENALOG) 0.1 % topical cream Apply  to affected area nightly. use thin layer 4/7/17  Yes Archana Serrano MD   cholecalciferol, vitamin D3, (VITAMIN D3) 2,000 unit tab Take  by mouth. Yes Historical Provider   sildenafil citrate (VIAGRA) 100 mg tablet Take 1 Tab by mouth as needed. 4/22/16  Yes Nicky Browne III, MD   cyanocobalamin (VITAMIN B12) 100 mcg tablet Take 100 mcg by mouth daily. Yes Historical Provider   OTHER Osteomatric   Yes Historical Provider   aspirin delayed-release 81 mg tablet Take 81 mg by mouth daily. Yes Historical Provider   multivitamin (ONE A DAY) tablet Take 1 Tab by mouth daily. Historical Provider       Social History     Social History    Marital status:      Spouse name: N/A    Number of children: N/A    Years of education: N/A     Occupational History    Not on file.      Social History Main Topics    Smoking status: Never Smoker    Smokeless tobacco: Never Used    Alcohol use 1.0 oz/week     2 Standard drinks or equivalent per week      Comment: socially     Drug use: No    Sexual activity: Yes     Partners: Female     Other Topics Concern    Not on file     Social History Narrative          ROS  Per HPI. BP has been off and on up to more than 821 systolic. Visit Vitals    /90    Pulse (!) 55    Temp 97.5 °F (36.4 °C) (Oral)    Resp 17    Ht 5' 6.5\" (1.689 m)    Wt 191 lb 3.2 oz (86.7 kg)    SpO2 100%    BMI 30.4 kg/m2         Physical Exam   Physical Examination: General appearance - alert, well appearing, and in no distress  Chest - clear to auscultation, no wheezes, rales or rhonchi, symmetric air entry  Heart - normal rate, regular rhythm, normal S1, S2, no murmurs, rubs, clicks or gallops  Abdomen - soft, nontender, nondistended, no masses or organomegaly  Extremities - varicose veins noted, right medial ankle with small scaly area and tiny scabbed over ulcer in the middle. Assessment/Plan:  Liyah was seen today for follow-up. Diagnoses and all orders for this visit:    Essential hypertension - check BP and see cardiology at his request.   -     valsartan (DIOVAN) 160 mg tablet; Take 1 Tab by mouth daily. Indications: hypertension  -     REFERRAL TO CARDIOLOGY    Stasis ulcer of ankle, right - continue compression and let me know if not improving.   -     triamcinolone acetonide (KENALOG) 0.1 % topical cream; Apply  to affected area nightly. use thin layer       Follow-up Disposition: 3 months and as needed       Advised him to call back or return to office if symptoms worsen/change/persist.  Discussed expected course/resolution/complications of diagnosis in detail with patient. Medication risks/benefits/costs/interactions/alternatives discussed with patient. He was given an after visit summary which includes diagnoses, current medications, & vitals. He expressed understanding with the diagnosis and plan.

## 2017-04-07 NOTE — PROGRESS NOTES
Chief Complaint   Patient presents with    Follow-up     Blood pressure     Patient is also concerned about right ankle swelling.

## 2017-04-17 ENCOUNTER — OFFICE VISIT (OUTPATIENT)
Dept: CARDIOLOGY CLINIC | Age: 67
End: 2017-04-17

## 2017-04-17 VITALS
SYSTOLIC BLOOD PRESSURE: 160 MMHG | HEART RATE: 60 BPM | RESPIRATION RATE: 16 BRPM | WEIGHT: 189.4 LBS | BODY MASS INDEX: 29.73 KG/M2 | DIASTOLIC BLOOD PRESSURE: 84 MMHG | HEIGHT: 67 IN

## 2017-04-17 DIAGNOSIS — R00.1 BRADYCARDIA: ICD-10-CM

## 2017-04-17 DIAGNOSIS — I10 ESSENTIAL HYPERTENSION: Primary | ICD-10-CM

## 2017-04-17 NOTE — MR AVS SNAPSHOT
Visit Information Date & Time Provider Department Dept. Phone Encounter #  
 4/17/2017  3:40 PM Mckay Beach MD [facility] [phone] [id number] Your Appointments 11/13/2017  9:30 AM  
PHYSICAL with Deysi Mckeon MD  
[facility] Medicine Shila Goodman) Appt Note: cpe  
 [address] [place] [address] [facility] [address]  
[address] [address] [facility] Upcoming Health Maintenance Date Due ZOSTER VACCINE AGE 60> 3/23/2008 Pneumococcal 65+ Low/Medium Risk (2 of 2 - PCV13) 10/9/2014 MEDICARE YEARLY EXAM 11/11/2017 GLAUCOMA SCREENING Q2Y 1/24/2019 DTaP/Tdap/Td series (2 - Td) 8/20/2022 COLONOSCOPY 10/22/2023 Allergies as of 4/17/2017  Review Complete On: 4/17/2017 By: Neto Rosas No Known Allergies Current Immunizations  Reviewed on 11/10/2016 Name Date Influenza High Dose Vaccine PF 11/9/2015, 10/9/2013 Influenza Vaccine 11/11/2014 Influenza Vaccine Whole 11/12/2012 Pneumococcal Polysaccharide (PPSV-23) 10/9/2013 TDAP Vaccine 8/20/2012 Not reviewed this visit Vitals BP Pulse Resp Height(growth percentile) Weight(growth percentile) BMI  
 160/84 (BP 1 Location: Left arm, BP Patient Position: Sitting) 60 16 5' 6.5\" (1.689 m) 189 lb 6.4 oz (85.9 kg) 30.11 kg/m2 Smoking Status Never Smoker BMI and BSA Data Body Mass Index Body Surface Area  
 30.11 kg/m 2 2.01 m 2 Preferred Pharmacy Pharmacy Name Phone [facility] [address] [facility] [address] [phone] Your Updated Medication List  
  
   
This list is accurate as of: 4/17/17  4:45 PM.  Always use your most recent med list.  
  
  
  
  
 aspirin delayed-release 81 mg tablet Take 81 mg by mouth daily. CALCIUM PO Take  by mouth daily. cyanocobalamin 100 mcg tablet Commonly known as:  VITAMIN B12 Take 100 mcg by mouth daily. multivitamin tablet Commonly known as:  ONE A DAY Take 1 Tab by mouth daily. OTHER Osteomatric  
  
 sildenafil citrate 100 mg tablet Commonly known as:  VIAGRA Take 1 Tab by mouth as needed. triamcinolone acetonide 0.1 % topical cream  
Commonly known as:  KENALOG Apply  to affected area nightly. use thin layer  
  
 valsartan 160 mg tablet Commonly known as:  DIOVAN Take 1 Tab by mouth daily. Indications: hypertension VITAMIN D3 2,000 unit Tab Generic drug:  cholecalciferol (vitamin D3) Take  by mouth. Patient Instructions Holter and Echo now Follow up with Ana Laura Harley in 2-3 weeks Introducing [facility]! Dear Ely Lawler: 
Thank you for requesting a CarJump account. Our records indicate that you already have an active CarJump account. You can access your account anytime at [email or web address] Did you know that you can access your hospital and ER discharge instructions at any time in CarJump? You can also review all of your test results from your hospital stay or ER visit. Additional Information If you have questions, please visit the Frequently Asked Questions section of the CarJump website at [email or web address] Remember, CarJump is NOT to be used for urgent needs. For medical emergencies, dial 911. Now available from your iPhone and Android! Please provide this summary of care documentation to your next provider. Your primary care clinician is listed as Pavan [phone] If you have any questions after today's visit, please call [phone].

## 2017-04-17 NOTE — PROGRESS NOTES
HISTORY OF PRESENT ILLNESS  Randa Patterson is a 69 y.o. male. Patient with h/o HTN here for evaluation of HTn and bradycardia  Past Medical History:   Diagnosis Date    Hypertension     OA (osteoarthritis) 8/19/2011     Past Surgical History:   Procedure Laterality Date    HX CATARACT REMOVAL  1993    left     HX HIP REPLACEMENT  1/1999    Left    HX HIP REPLACEMENT  6/2000 and 10/2004    Right times 2    GA ANESTH,KNEE AREA SURGERY  1966     Social History     Social History    Marital status:      Spouse name: N/A    Number of children: N/A    Years of education: N/A     Occupational History    Not on file. Social History Main Topics    Smoking status: Never Smoker    Smokeless tobacco: Never Used    Alcohol use 1.0 oz/week     2 Standard drinks or equivalent per week      Comment: socially     Drug use: No    Sexual activity: Yes     Partners: Female     Other Topics Concern    Not on file     Social History Narrative     Family History   Problem Relation Age of Onset    Lung Disease Mother     Heart Disease Father     Hypertension Sister     Hypertension Brother     Hypertension Son     Hypertension Daughter        HPI  He denies any cp or sob or palpitations. No syncope or dizziness reported  Stopped lisinopril and started valsartan  Review of Systems   Respiratory: Negative. Cardiovascular: Negative. Visit Vitals    /84 (BP 1 Location: Left arm, BP Patient Position: Sitting)    Pulse 60    Resp 16    Ht 5' 6.5\" (1.689 m)    Wt 85.9 kg (189 lb 6.4 oz)    BMI 30.11 kg/m2       Physical Exam   Neck: No JVD present. Carotid bruit is not present. Cardiovascular: Normal rate and regular rhythm. Frequent extrasystoles are present. Pulmonary/Chest: Effort normal and breath sounds normal.   Abdominal: Soft. Musculoskeletal: He exhibits no edema. Psychiatric: He has a normal mood and affect.      Current Outpatient Prescriptions on File Prior to Visit Medication Sig Dispense Refill    CALCIUM PO Take  by mouth daily.  valsartan (DIOVAN) 160 mg tablet Take 1 Tab by mouth daily. Indications: hypertension 30 Tab 3    triamcinolone acetonide (KENALOG) 0.1 % topical cream Apply  to affected area nightly. use thin layer 45 g 0    cholecalciferol, vitamin D3, (VITAMIN D3) 2,000 unit tab Take  by mouth.  cyanocobalamin (VITAMIN B12) 100 mcg tablet Take 100 mcg by mouth daily.  OTHER Osteomatric      aspirin delayed-release 81 mg tablet Take 81 mg by mouth daily.  sildenafil citrate (VIAGRA) 100 mg tablet Take 1 Tab by mouth as needed. 10 Tab 5    multivitamin (ONE A DAY) tablet Take 1 Tab by mouth daily. No current facility-administered medications on file prior to visit. ASSESSMENT and PLAN  HTN: still HTN today valsartan only started few days ago wait for 1-2 weeks before making any additional changes. Discussed also salt intake and need for decrease. Keep log of bp for next 2 weeks and return also with his BP machine  Need to carefully add if needed sice he was quite dizzy in the past with just slightly higher dose of bp meds  Obtain echo  Bradycardia: multiple ectopies on physical examination. Also reports of bradycardia.  Proceed with holter  Stress test on the back burner for now in this asymptomatic patient   Otherwise see him back in 2-3 weeks

## 2017-04-19 ENCOUNTER — CLINICAL SUPPORT (OUTPATIENT)
Dept: CARDIOLOGY CLINIC | Age: 67
End: 2017-04-19

## 2017-04-19 DIAGNOSIS — I47.1 ATRIAL TACHYCARDIA (HCC): ICD-10-CM

## 2017-04-19 DIAGNOSIS — I10 ESSENTIAL HYPERTENSION: Primary | ICD-10-CM

## 2017-04-19 DIAGNOSIS — I49.3 PVC (PREMATURE VENTRICULAR CONTRACTION): ICD-10-CM

## 2017-04-19 DIAGNOSIS — I49.1 PAC (PREMATURE ATRIAL CONTRACTION): Primary | ICD-10-CM

## 2017-05-09 ENCOUNTER — OFFICE VISIT (OUTPATIENT)
Dept: CARDIOLOGY CLINIC | Age: 67
End: 2017-05-09

## 2017-05-09 VITALS
BODY MASS INDEX: 29.57 KG/M2 | OXYGEN SATURATION: 98 % | HEART RATE: 60 BPM | RESPIRATION RATE: 16 BRPM | SYSTOLIC BLOOD PRESSURE: 140 MMHG | DIASTOLIC BLOOD PRESSURE: 90 MMHG | HEIGHT: 67 IN | WEIGHT: 188.4 LBS

## 2017-05-09 DIAGNOSIS — I10 ESSENTIAL HYPERTENSION: Primary | ICD-10-CM

## 2017-05-09 NOTE — PROGRESS NOTES
HISTORY OF PRESENT ILLNESS  Randa Patterson is a 69 y.o. male. Patient with h/o HTN seen here for evaluation of HTN and bradycardia  ECHO on 4/17:Left ventricle: Systolic function was normal. Ejection fraction was  estimated in the range of 55 % to 60 %. There were no regional wall motion  abnormalities. Mitral valve: There was mild regurgitation. Aortic valve: Normal valve structure. The valve was trileaflet. Leaflets  exhibited mildly increased thickness, normal cuspal separation, and  sclerosis. Tricuspid valve: There was mild regurgitation.   HOLTER on 5/9/17: sinus bradycardia  with average of 58 frequent pacs and rare pvcs, 8 episodes of possible PAT with the longest of 31 beats at 165       Past Medical History:   Diagnosis Date    Hypertension      OA (osteoarthritis) 8/19/2011            Past Surgical History:   Procedure Laterality Date    HX CATARACT REMOVAL   1993     left     HX HIP REPLACEMENT   1/1999     Left    HX HIP REPLACEMENT   6/2000 and 10/2004     Right times 2    OR ANESTH,KNEE AREA SURGERY   1966      Social History            Social History    Marital status:        Spouse name: N/A    Number of children: N/A    Years of education: N/A          Occupational History    Not on file.              Social History Main Topics    Smoking status: Never Smoker    Smokeless tobacco: Never Used    Alcohol use 1.0 oz/week        2 Standard drinks or equivalent per week          Comment: socially     Drug use: No    Sexual activity: Yes       Partners: Female           Other Topics Concern    Not on file      Social History Narrative            Family History   Problem Relation Age of Onset    Lung Disease Mother      Heart Disease Father      Hypertension Sister      Hypertension Brother      Hypertension Son      Hypertension Daughter         HPI  Doing great improved his diet decreasing cold cuts  No cp or sob or dizziness or palpitations reported  [facility] often to the [facility] with no issues  Review of Systems   Constitutional: Negative. Respiratory: Negative. Cardiovascular: Negative. Visit Vitals    /90 (BP 1 Location: Left arm, BP Patient Position: Sitting)    Pulse 60    Resp 16    Ht 5' 6.5\" (1.689 m)    Wt 85.5 kg (188 lb 6.4 oz)    SpO2 98%    BMI 29.95 kg/m2       Physical Exam   Neck: No JVD present. Carotid bruit is not present. Cardiovascular: Normal rate and regular rhythm. Pulmonary/Chest: Effort normal and breath sounds normal.   Abdominal: Soft. Musculoskeletal: He exhibits no edema. Psychiatric: He has a normal mood and affect. Current Outpatient Prescriptions on File Prior to Visit   Medication Sig Dispense Refill    CALCIUM PO Take  by mouth daily.  valsartan (DIOVAN) 160 mg tablet Take 1 Tab by mouth daily. Indications: hypertension 30 Tab 3    triamcinolone acetonide (KENALOG) 0.1 % topical cream Apply  to affected area nightly. use thin layer 45 g 0    cholecalciferol, vitamin D3, (VITAMIN D3) 2,000 unit tab Take  by mouth.  cyanocobalamin (VITAMIN B12) 100 mcg tablet Take 100 mcg by mouth daily.  OTHER Osteomatric      aspirin delayed-release 81 mg tablet Take 81 mg by mouth daily.  sildenafil citrate (VIAGRA) 100 mg tablet Take 1 Tab by mouth as needed. 10 Tab 5     No current facility-administered medications on file prior to visit.       Lab Results   Component Value Date/Time    Sodium 140 11/10/2016 04:45 PM    Potassium 5.7 11/10/2016 04:45 PM    Chloride 99 11/10/2016 04:45 PM    CO2 28 11/10/2016 04:45 PM    Anion gap 7 07/21/2010 11:05 AM    Glucose 76 11/10/2016 04:45 PM    BUN 14 11/10/2016 04:45 PM    Creatinine 0.99 11/10/2016 04:45 PM    BUN/Creatinine ratio 14 11/10/2016 04:45 PM    GFR est AA 90 11/10/2016 04:45 PM    GFR est non-AA 78 11/10/2016 04:45 PM    Calcium 9.7 11/10/2016 04:45 PM     Lab Results   Component Value Date/Time    TSH 2.860 11/10/2016 04:45 PM    TSH 1.670 11/03/2014 07:50 AM         A/P:  HTN: better controlled on current medications, log reviewed no additional changes in medication at this time. Discussed also salt intake and need for decrease. He will continue exercise   Discussed echo , normal EF and mild mr/tr echo to be repeated in 2 years if no recurrent issues  Bradycardia: holter discussed , rare PAT and completely asymptomatic no need for intervention at this time. Average 58 , we have discussed heart conditioning and conduction disease.  It is quite mild in my opinion and no need for intervention at this time  Stress test on the back burner for now in this asymptomatic patient   Elevated potassium in the past I do not think an issue with bradycardia but will re check levels and tsh  Otherwise if all ok I will see back in 1 year unless of occurring issues

## 2017-05-09 NOTE — MR AVS SNAPSHOT
Visit Information Date & Time Provider Department Dept. Phone Encounter #  
 5/9/2017  8:40 AM Evonne Tilley MD [facility] [phone] [id number] Your Appointments 11/13/2017  9:30 AM  
PHYSICAL with Gerhardt Churchman, MD  
[facility] [address]) Appt Note: cpe  
 [address] [facility] [address] [facility] [address]  
[address] [address] [address] [facility] Upcoming Health Maintenance Date Due ZOSTER VACCINE AGE 60> 3/23/2008 Pneumococcal 65+ Low/Medium Risk (2 of 2 - PCV13) 10/9/2014 INFLUENZA AGE 9 TO ADULT 8/1/2017 MEDICARE YEARLY EXAM 11/11/2017 GLAUCOMA SCREENING Q2Y 1/24/2019 DTaP/Tdap/Td series (2 - Td) 8/20/2022 COLONOSCOPY 10/22/2023 Allergies as of 5/9/2017  Review Complete On: 5/9/2017 By: Urban Navarro No Known Allergies Current Immunizations  Reviewed on 11/10/2016 Name Date Influenza High Dose Vaccine PF 11/9/2015, 10/9/2013 Influenza Vaccine 11/11/2014 Influenza Vaccine Whole 11/12/2012 Pneumococcal Polysaccharide (PPSV-23) 10/9/2013 TDAP Vaccine 8/20/2012 Not reviewed this visit Vitals BP Pulse Resp Height(growth percentile) Weight(growth percentile) SpO2  
 140/90 (BP 1 Location: Left arm, BP Patient Position: Sitting) 60 16 5' 6.5\" (1.689 m) 188 lb 6.4 oz (85.5 kg) 98% BMI Smoking Status 29.95 kg/m2 Never Smoker BMI and BSA Data Body Mass Index Body Surface Area  
 29.95 kg/m 2 2 m 2 Preferred Pharmacy Pharmacy Name Phone [facility] [place], [place] - [address] [place] [address] [phone] Your Updated Medication List  
  
   
This list is accurate as of: 5/9/17  9:41 AM.  Always use your most recent med list.  
  
  
  
  
 aspirin delayed-release 81 mg tablet Take 81 mg by mouth daily. CALCIUM PO Take  by mouth daily. cyanocobalamin 100 mcg tablet Commonly known as:  VITAMIN B12 Take 100 mcg by mouth daily. OTHER Osteomatric  
  
 sildenafil citrate 100 mg tablet Commonly known as:  VIAGRA Take 1 Tab by mouth as needed. triamcinolone acetonide 0.1 % topical cream  
Commonly known as:  KENALOG Apply  to affected area nightly. use thin layer  
  
 valsartan 160 mg tablet Commonly known as:  DIOVAN Take 1 Tab by mouth daily. Indications: hypertension VITAMIN D3 2,000 unit Tab Generic drug:  cholecalciferol (vitamin D3) Take  by mouth. Patient Instructions Follow up with Jerad Lujan in 1 year Introducing \[facility]\"" & [facility] SERVICES! Dear Gillian Fu: 
Thank you for requesting a OnState account. Our records indicate that you already have an active OnState account. You can access your account anytime at [email or web address] Did you know that you can access your hospital and ER discharge instructions at any time in OnState? You can also review all of your test results from your hospital stay or ER visit. Additional Information If you have questions, please visit the Frequently Asked Questions section of the OnState website at [email or web address] Remember, OnState is NOT to be used for urgent needs. For medical emergencies, dial 911. Now available from your iPhone and Android! Please provide this summary of care documentation to your next provider. Your primary care clinician is listed as Pavan [phone] If you have any questions after today's visit, please call [phone].

## 2017-05-11 ENCOUNTER — HOSPITAL ENCOUNTER (OUTPATIENT)
Dept: LAB | Age: 67
Discharge: HOME | End: 2017-05-11
Payer: MEDICARE

## 2017-05-11 PROCEDURE — 36415 COLL VENOUS BLD VENIPUNCTURE: CPT

## 2017-05-11 PROCEDURE — 80048 BASIC METABOLIC PNL TOTAL CA: CPT

## 2017-05-11 PROCEDURE — 84443 ASSAY THYROID STIM HORMONE: CPT

## 2017-05-12 LAB
BUN SERPL-MCNC: 17 MG/DL (ref 8–27)
BUN/CREAT SERPL: 20 (ref 10–24)
CALCIUM SERPL-MCNC: 9.5 MG/DL (ref 8.6–10.2)
CHLORIDE SERPL-SCNC: 102 MMOL/L (ref 96–106)
CO2 SERPL-SCNC: 23 MMOL/L (ref 18–29)
CREAT SERPL-MCNC: 0.83 MG/DL (ref 0.76–1.27)
GLUCOSE SERPL-MCNC: 96 MG/DL (ref 65–99)
POTASSIUM SERPL-SCNC: 5 MMOL/L (ref 3.5–5.2)
SODIUM SERPL-SCNC: 141 MMOL/L (ref 134–144)
TSH SERPL DL<=0.005 MIU/L-ACNC: 3.04 UIU/ML (ref 0.45–4.5)

## 2017-08-04 DIAGNOSIS — I10 ESSENTIAL HYPERTENSION: ICD-10-CM

## 2017-08-04 RX ORDER — VALSARTAN 160 MG/1
TABLET ORAL
Qty: 30 TAB | Refills: 0 | Status: SHIPPED | OUTPATIENT
Start: 2017-08-04 | End: 2017-09-03 | Stop reason: SDUPTHER

## 2017-09-03 DIAGNOSIS — I10 ESSENTIAL HYPERTENSION: ICD-10-CM

## 2017-09-04 RX ORDER — VALSARTAN 160 MG/1
TABLET ORAL
Qty: 30 TAB | Refills: 0 | Status: SHIPPED | OUTPATIENT
Start: 2017-09-04 | End: 2017-10-03 | Stop reason: SDUPTHER

## 2017-10-03 DIAGNOSIS — I10 ESSENTIAL HYPERTENSION: ICD-10-CM

## 2017-10-03 RX ORDER — VALSARTAN 160 MG/1
TABLET ORAL
Qty: 30 TAB | Refills: 0 | Status: SHIPPED | OUTPATIENT
Start: 2017-10-03 | End: 2017-11-01 | Stop reason: SDUPTHER

## 2017-11-01 DIAGNOSIS — I10 ESSENTIAL HYPERTENSION: ICD-10-CM

## 2017-11-02 RX ORDER — VALSARTAN 160 MG/1
TABLET ORAL
Qty: 30 TAB | Refills: 0 | Status: SHIPPED | OUTPATIENT
Start: 2017-11-02 | End: 2017-12-02 | Stop reason: SDUPTHER

## 2017-11-13 ENCOUNTER — OFFICE VISIT (OUTPATIENT)
Dept: INTERNAL MEDICINE CLINIC | Age: 67
End: 2017-11-13

## 2017-11-13 ENCOUNTER — HOSPITAL ENCOUNTER (OUTPATIENT)
Dept: LAB | Age: 67
Discharge: HOME | End: 2017-11-13
Payer: MEDICARE

## 2017-11-13 VITALS
OXYGEN SATURATION: 100 % | TEMPERATURE: 98.2 F | RESPIRATION RATE: 16 BRPM | HEIGHT: 67 IN | WEIGHT: 186 LBS | BODY MASS INDEX: 29.19 KG/M2 | SYSTOLIC BLOOD PRESSURE: 132 MMHG | HEART RATE: 54 BPM | DIASTOLIC BLOOD PRESSURE: 84 MMHG

## 2017-11-13 DIAGNOSIS — Z00.00 MEDICARE ANNUAL WELLNESS VISIT, SUBSEQUENT: Primary | ICD-10-CM

## 2017-11-13 DIAGNOSIS — I10 ESSENTIAL HYPERTENSION: ICD-10-CM

## 2017-11-13 DIAGNOSIS — M17.0 PRIMARY OSTEOARTHRITIS OF BOTH KNEES: ICD-10-CM

## 2017-11-13 DIAGNOSIS — M17.12 ARTHRITIS OF KNEE, LEFT: ICD-10-CM

## 2017-11-13 DIAGNOSIS — Z12.5 PROSTATE CANCER SCREENING: ICD-10-CM

## 2017-11-13 PROCEDURE — 80061 LIPID PANEL: CPT

## 2017-11-13 PROCEDURE — 36415 COLL VENOUS BLD VENIPUNCTURE: CPT

## 2017-11-13 PROCEDURE — 85025 COMPLETE CBC W/AUTO DIFF WBC: CPT

## 2017-11-13 PROCEDURE — 84153 ASSAY OF PSA TOTAL: CPT

## 2017-11-13 PROCEDURE — 80053 COMPREHEN METABOLIC PANEL: CPT

## 2017-11-13 PROCEDURE — 81001 URINALYSIS AUTO W/SCOPE: CPT

## 2017-11-13 RX ORDER — METHYLPREDNISOLONE ACETATE 40 MG/ML
80 INJECTION, SUSPENSION INTRA-ARTICULAR; INTRALESIONAL; INTRAMUSCULAR; SOFT TISSUE ONCE
Qty: 2 VIAL | Refills: 0
Start: 2017-11-13 | End: 2017-11-13

## 2017-11-13 NOTE — PROGRESS NOTES
This is a Subsequent Medicare Annual Wellness Exam (AWV) (Performed 12 months after IPPE or effective date of Medicare Part B enrollment)  As well as for follow-up of his health issues. His blood pressures under much better control and is not having any dizzy spells. He is having ongoing issues with left knee pain. He is about to go to [facility] for a trip this week and wondered if he could get his knee injected. He has a difficult time with walking long distances secondary to pain. Denies any chest pains or shortness of breath. No cough or wheeze. No change in bowel or bladder habits. I have reviewed the patient's medical history in detail and updated the computerized patient record. History     Past Medical History:   Diagnosis Date    Hypertension     OA (osteoarthritis) 8/19/2011      Past Surgical History:   Procedure Laterality Date    HX CATARACT REMOVAL  1993    left     HX HIP REPLACEMENT  1/1999    Left    HX HIP REPLACEMENT  6/2000 and 10/2004    Right times 2    MI ANESTH,KNEE AREA SURGERY  1966     Current Outpatient Prescriptions   Medication Sig Dispense Refill    valsartan (DIOVAN) 160 mg tablet TAKE ONE TABLET BY MOUTH ONCE DAILY HYPERTENSION 30 Tab 0    CALCIUM PO Take  by mouth daily.  triamcinolone acetonide (KENALOG) 0.1 % topical cream Apply  to affected area nightly. use thin layer 45 g 0    cholecalciferol, vitamin D3, (VITAMIN D3) 2,000 unit tab Take  by mouth.  cyanocobalamin (VITAMIN B12) 100 mcg tablet Take 100 mcg by mouth daily.  OTHER Osteomatric      aspirin delayed-release 81 mg tablet Take 81 mg by mouth daily.  sildenafil citrate (VIAGRA) 100 mg tablet Take 1 Tab by mouth as needed.  10 Tab 5     No Known Allergies  Family History   Problem Relation Age of Onset    Lung Disease Mother     Heart Disease Father     Hypertension Sister     Hypertension Brother     Hypertension Son     Hypertension Daughter      Social History Substance Use Topics    Smoking status: Never Smoker    Smokeless tobacco: Never Used    Alcohol use 1.0 oz/week     2 Standard drinks or equivalent per week      Comment: socially      Patient Active Problem List   Diagnosis Code    HTN (hypertension) I10    OA (osteoarthritis) M19.90    Advance directive discussed with patient Z71.89   ROS - Per HPI  Physical Examination: General appearance - alert, well appearing, and in no distress  Eyes - pupils equal and reactive, extraocular eye movements intact  Ears - bilateral TM's and external ear canals normal  Nose - normal and patent, no erythema, discharge or polyps  Mouth - mucous membranes moist, pharynx normal without lesions  Neck - supple, no significant adenopathy  Lymphatics - no palpable lymphadenopathy, no hepatosplenomegaly  Chest - clear to auscultation, no wheezes, rales or rhonchi, symmetric air entry  Heart - normal rate, regular rhythm, normal S1, S2, no murmurs, rubs, clicks or gallops  Abdomen - soft, nontender, nondistended, no masses or organomegaly  Rectal - negative without mass, lesions or tenderness, stool guaiac negative, PROSTATE EXAM: smooth and symmetric without nodules or tenderness  Back exam - full range of motion, no tenderness, palpable spasm or pain on motion  Neurological - alert, oriented, normal speech, no focal findings or movement disorder noted  Musculoskeletal - no joint tenderness, deformity or swelling  Extremities - peripheral pulses normal, no pedal edema, no clubbing or cyanosis  DJD changes in both knees with medial joint line tenderness on the left. Mild swelling. After informed consent including the risk of bleeding and infection as well as timeout, the left knee was prepped with Betadine and injected with 80 mg of Depo-Medrol as well as 1 cc of lidocaine. He tolerated the procedure well under sterile technique and a bandage was applied.       Depression Risk Factor Screening:     PHQ over the last two weeks 2/6/2017   Little interest or pleasure in doing things Not at all   Feeling down, depressed or hopeless Not at all   Total Score PHQ 2 0     Alcohol Risk Factor Screening: You do not drink alcohol or very rarely. Functional Ability and Level of Safety:   Hearing Loss  Hearing is good. Activities of Daily Living  The home contains: no safety equipment. Patient does total self care    Fall RiskFall Risk Assessment, last 12 mths 2/6/2017   Able to walk? Yes   Fall in past 12 months? No   Fall with injury? -   Number of falls in past 12 months -   Fall Risk Score -       Abuse Screen  Patient is not abused    Cognitive Screening   Evaluation of Cognitive Function:  Has your family/caregiver stated any concerns about your memory: no      Patient Care Team   Patient Care Team:  Kev Issa MD as PCP - Maria Dolores Farrell MD (Cardiology)    Assessment/Plan   Education and counseling provided:  Are appropriate based on today's review and evaluation  End-of-Life planning (with patient's consent)  Influenza Vaccine  Prostate cancer screening tests (PSA, covered annually)  Diabetes screening test    Diagnoses and all orders for this visit:    1. Essential hypertension    2. Primary osteoarthritis of both knees    3. Medicare annual wellness visit, subsequent    Other orders  -     CBC WITH AUTOMATED DIFF  -     METABOLIC PANEL, COMPREHENSIVE  -     LIPID PANEL  -     UA/M W/RFLX CULTURE, ROUTINE  -     PSA SCREENING (SCREENING)        Health Maintenance Due   Topic Date Due    ZOSTER VACCINE AGE 60>  01/23/2008    MEDICARE YEARLY EXAM  11/11/2017     Diagnoses and all orders for this visit:    1. Medicare annual wellness visit, subsequent  -     AMB POC FECAL BLOOD, OCCULT, QL 1 CARD    2. Essential hypertension blood pressure well controlled currently and will continue his current medicines as he is tolerating it well.   -     CBC WITH AUTOMATED DIFF  -     METABOLIC PANEL, COMPREHENSIVE  -     LIPID PANEL  -     UA/M W/RFLX CULTURE, ROUTINE    3. Primary osteoarthritis of both knees    4. Prostate cancer screening  -     PSA SCREENING (SCREENING)    5. Arthritis of knee, left injected and he will let me know if this improves  -     methylPREDNISolone acetate (DEPO-MEDROL) 40 mg/mL injection; 2 mL by Intra artICUlar route once for 1 dose. -     METHYLPREDNISOLONE ACETATE INJECTION 80 MG  -     DRAIN/INJECT LARGE JOINT/BURSA  -     THER/PROPH/DIAG INJECTION, SUBCUT/IM       Follow-up Disposition: 12 months and as needed   Advised him to call back or return to office if symptoms worsen/change/persist.  Discussed expected course/resolution/complications of diagnosis in detail with patient. Medication risks/benefits/costs/interactions/alternatives discussed with patient. He was given an after visit summary which includes diagnoses, current medications, & vitals. He expressed understanding with the diagnosis and plan.

## 2017-11-13 NOTE — PROGRESS NOTES
[facility]  OFFICE PROCEDURE PROGRESS NOTE        Chart reviewed for the following:   Dorinda Santos LPN, have reviewed the History, Physical and updated the Allergic reactions for Janiya Hopper performed immediately prior to start of procedure:   Dorinda Santos LPN, have performed the following reviews on Rafita Saul prior to the start of the procedure:            * Patient was identified by name and date of birth   * Agreement on procedure being performed was verified - Left knee injection.   * Risks and Benefits explained to the patient  * Procedure site verified and marked as necessary  * Patient was positioned for comfort  * Consent was signed and verified     Time: 10:19 AM      Date of procedure: 11/13/2017    Procedure performed by:  Sadia Lovelace MD    Provider assisted by: Sandeep Spence LPN    Patient assisted by: self    How tolerated by patient: tolerated the procedure well with no complications    Pre Procedural Pain Scale: 0 - No Hurt    Post Procedural Pain Scale: 0 - No Hurt    Comments: none

## 2017-11-13 NOTE — PATIENT INSTRUCTIONS
As discussed in your appointment today, [address] is an important part of planning for your healthcare future. Discussing your preferences with your family and your care team is a part of good healthcare so that we can be guided by your known values and goals. Our office offers this service for you at your convenience. Our Nurse Navigators and certified [employer]ing Choices ® Facilitators, Anabella Vance and Marcia Jane typically schedule family appointments for this service on Wednesdays. To schedule an Advance Care Planning visit or to receive more information about this service, please call [facility] at [phone] and ask to speak directly to Mikey Espinal or [employer]. Advance Care Planning: Care Instructions  Your Care Instructions  It can be hard to live with an illness that cannot be cured. But if your health is getting worse, you may want to make decisions about end-of-life care. Planning for the end of your life does not mean that you are giving up. It is a way to make sure that your wishes are met. Clearly stating your wishes can make it easier for your loved ones. Making plans while you are still able may also ease your mind and make your final days less stressful and more meaningful. Follow-up care is a key part of your treatment and safety. Be sure to make and go to all appointments, and call your doctor if you are having problems. It's also a good idea to know your test results and keep a list of the medicines you take. What can you do to plan for the end of life? · You can bring these issues up with your doctor. You do not need to wait until your doctor starts the conversation. You might start with \"I would not be willing to live with . Shabbir Uribe \" When you complete this sentence it helps your doctor understand your wishes. · Talk openly and honestly with your doctor. This is the best way to understand the decisions you will need to make as your health changes.  Know that you can always change your mind. · Ask your doctor about commonly used life-support measures. These include tube feedings, breathing machines, and fluids given through a vein (IV). Understanding these treatments will help you decide whether you want them. · You may choose to have these life-supporting treatments for a limited time. This allows a trial period to see whether they will help you. You may also decide that you want your doctor to take only certain measures to keep you alive. It is important to spell out these conditions so that your doctor and family understand them. · Talk to your doctor about how long you are likely to live. He or she may be able to give you an idea of what usually happens with your specific illness. · Think about preparing papers that state your wishes. This way there will not be any confusion about what you want. You can change your instructions at any time. Which papers should you prepare? Advance directives are legal papers that tell doctors how you want to be cared for at the end of your life. You do not need a  to write these papers. Ask your doctor or your state health department for information on how to write your advance directives. They may have the forms for each of these types of papers. Make sure your doctor has a copy of these on file, and give a copy to a family member or close friend. · Consider a do-not-resuscitate order (DNR). This order asks that no extra treatments be done if your heart stops or you stop breathing. Extra treatments may include electrical shock to restart your heart or a machine to breathe for you. If you decide to have a DNR order, ask your doctor to explain and write it. Place the order in your home where everyone can easily see it. · Consider a living will. A living will explains your wishes in case you are in a coma or cannot communicate. Living ignacio tell doctors to use or not use treatments that would keep you alive.  You must have one or two witnesses or a notary present when you sign this form. · Consider a durable power of . This allows you to name a person to make decisions about your care if you are not able to. Most people ask a close friend or family member. Talk to this person about the kinds of treatments you want and those that you do not want. Make sure this person understands your wishes. If this person is not the health care agent named in your advance directive, also talk with your health care agent. These legal papers are simple to change. Tell your doctor what you want to change, and ask him or her to make a note in your medical file. Give your family updated copies of the papers. Medicare Wellness Visit, Male    The best way to live healthy is to have a healthy lifestyle by eating a well-balanced diet, exercising regularly, limiting alcohol and stopping smoking. Regular physical exams and screening tests are another way to keep healthy. Preventive exams provided by your health care provider can find health problems before they become diseases or illnesses. Preventive services including immunizations, screening tests, monitoring and exams can help you take care of your own health. All people over age 72 should have a pneumovax  and and a prevnar shot to prevent pneumonia. These are once in a lifetime unless you and your provider decide differently. All people over 65 should have a yearly flu shot and a tetanus vaccine every 10 years. Screening for diabetes mellitus with a blood sugar test should be done every year. Glaucoma is a disease of the eye due to increased ocular pressure that can lead to blindness and it should be done every year by an eye professional.    Cardiovascular screening tests that check for elevated lipids (fatty part of blood) which can lead to heart disease and strokes should be done every 5 years.     Colorectal screening that evaluates for blood or polyps in your colon should be done yearly as a stool test or every five years as a flexible sigmoidoscope or every 10 years as a colonoscopy up to age 76. Men up to age 76 may need a screening blood test for prostate cancer at certain intervals, depending on their personal and family history. This decision is between the patient and his provider. If you have been a smoker or had family history of abdominal aortic aneurysms, you and your provider may decide to schedule an ultrasound test of your aorta. Hepatitis C screening is also recommended for anyone born between [birth year] through [address]. A shingles vaccine is also recommended once in a lifetime after age 61. Your Medicare Wellness Exam is recommended annually. Here is a list of your current Health Maintenance items with a due date:  Health Maintenance Due   Topic Date Due    Shingles Vaccine  01/23/2008    Annual Well Visit  11/11/2017          Joint Injections: Care Instructions  Your Care Instructions  Joint injections are shots into a joint, such as the knee. They may be used to put in medicines, such as pain relievers. Or they can be used to take out fluid. Sometimes the fluid is tested in a lab. This can help find the cause of a joint problem. A corticosteroid, or steroid, shot is used to reduce inflammation in tendons or joints. It is often used to treat problems such as arthritis, tendinitis, and bursitis. Steroids can be injected directly into a painful, inflamed joint. They can also help reduce inflammation of a bursa. A bursa is a sac of fluid. It cushions and lubricates areas where tendons, ligaments, skin, muscles, or bones rub against each other. A steroid shot can sometimes help with short-term pain relief when other treatments haven't worked. If steroid shots help, pain may improve for weeks or months. Follow-up care is a key part of your treatment and safety. Be sure to make and go to all appointments, and call your doctor if you are having problems.  It's also a good idea to know your test results and keep a list of the medicines you take. How can you care for yourself at home? · Put ice or a cold pack on the area for 10 to 20 minutes at a time. Put a thin cloth between the ice and your skin. · Take anti-inflammatory medicines to reduce pain, swelling, or inflammation. These include ibuprofen (Advil, Motrin) and naproxen (Aleve). Read and follow all instructions on the label. · Avoid strenuous activities for several days, especially those that put stress on the area where you got the shot. · If you have dressings over the area, keep them clean and dry. You may remove them when your doctor tells you to. When should you call for help? Call your doctor now or seek immediate medical care if:  ? · You have signs of infection, such as:  ¨ Increased pain, swelling, warmth, or redness. ¨ Red streaks leading from the site. ¨ Pus draining from the site. ¨ A fever. ? Watch closely for changes in your health, and be sure to contact your doctor if you have any problems. Where can you learn more? Go to http://kaycee-cedric.info/. Enter N616 in the search box to learn more about \"Joint Injections: Care Instructions. \"  Current as of: March 21, 2017  Content Version: 11.4  © 0268-0419 [employer]. Care instructions adapted under license by [employer] (which disclaims liability or warranty for this information). If you have questions about a medical condition or this instruction, always ask your healthcare professional. [place] [address] any warranty or liability for your use of this information.

## 2017-11-14 LAB
ALBUMIN SERPL-MCNC: 4.4 G/DL (ref 3.6–4.8)
ALBUMIN/GLOB SERPL: 1.8 {RATIO} (ref 1.2–2.2)
ALP SERPL-CCNC: 59 IU/L (ref 39–117)
ALT SERPL-CCNC: 19 IU/L (ref 0–44)
APPEARANCE UR: CLEAR
AST SERPL-CCNC: 25 IU/L (ref 0–40)
BACTERIA #/AREA URNS HPF: NORMAL /[HPF]
BASOPHILS # BLD AUTO: 0 X10E3/UL (ref 0–0.2)
BASOPHILS NFR BLD AUTO: 1 %
BILIRUB SERPL-MCNC: 0.5 MG/DL (ref 0–1.2)
BILIRUB UR QL STRIP: NEGATIVE
BUN SERPL-MCNC: 15 MG/DL (ref 8–27)
BUN/CREAT SERPL: 17 (ref 10–24)
CALCIUM SERPL-MCNC: 9.5 MG/DL (ref 8.6–10.2)
CASTS URNS QL MICRO: NORMAL /LPF
CHLORIDE SERPL-SCNC: 100 MMOL/L (ref 96–106)
CHOLEST SERPL-MCNC: 181 MG/DL (ref 100–199)
CO2 SERPL-SCNC: 25 MMOL/L (ref 18–29)
COLOR UR: YELLOW
CREAT SERPL-MCNC: 0.9 MG/DL (ref 0.76–1.27)
EOSINOPHIL # BLD AUTO: 0.1 X10E3/UL (ref 0–0.4)
EOSINOPHIL NFR BLD AUTO: 2 %
EPI CELLS #/AREA URNS HPF: NORMAL /HPF
ERYTHROCYTE [DISTWIDTH] IN BLOOD BY AUTOMATED COUNT: 15 % (ref 12.3–15.4)
GFR SERPLBLD CREATININE-BSD FMLA CKD-EPI: 100 ML/MIN/1.73
GFR SERPLBLD CREATININE-BSD FMLA CKD-EPI: 87 ML/MIN/1.73
GLOBULIN SER CALC-MCNC: 2.5 G/DL (ref 1.5–4.5)
GLUCOSE SERPL-MCNC: 88 MG/DL (ref 65–99)
GLUCOSE UR QL: NEGATIVE
HCT VFR BLD AUTO: 43.9 % (ref 37.5–51)
HDLC SERPL-MCNC: 64 MG/DL
HGB BLD-MCNC: 15.1 G/DL (ref 12.6–17.7)
HGB UR QL STRIP: NEGATIVE
IMM GRANULOCYTES # BLD: 0 X10E3/UL (ref 0–0.1)
IMM GRANULOCYTES NFR BLD: 0 %
KETONES UR QL STRIP: NEGATIVE
LDLC SERPL CALC-MCNC: 106 MG/DL (ref 0–99)
LEUKOCYTE ESTERASE UR QL STRIP: NEGATIVE
LYMPHOCYTES # BLD AUTO: 1.8 X10E3/UL (ref 0.7–3.1)
LYMPHOCYTES NFR BLD AUTO: 26 %
MCH RBC QN AUTO: 28.5 PG (ref 26.6–33)
MCHC RBC AUTO-ENTMCNC: 34.4 G/DL (ref 31.5–35.7)
MCV RBC AUTO: 83 FL (ref 79–97)
MICRO URNS: NORMAL
MICRO URNS: NORMAL
MONOCYTES # BLD AUTO: 0.6 X10E3/UL (ref 0.1–0.9)
MONOCYTES NFR BLD AUTO: 8 %
NEUTROPHILS # BLD AUTO: 4.6 X10E3/UL (ref 1.4–7)
NEUTROPHILS NFR BLD AUTO: 63 %
NITRITE UR QL STRIP: NEGATIVE
PH UR STRIP: 5.5 [PH] (ref 5–7.5)
PLATELET # BLD AUTO: 329 X10E3/UL (ref 150–379)
POTASSIUM SERPL-SCNC: 4.8 MMOL/L (ref 3.5–5.2)
PROT SERPL-MCNC: 6.9 G/DL (ref 6–8.5)
PROT UR QL STRIP: NEGATIVE
PSA SERPL-MCNC: 1.2 NG/ML (ref 0–4)
RBC # BLD AUTO: 5.29 X10E6/UL (ref 4.14–5.8)
RBC #/AREA URNS HPF: NORMAL /HPF
SODIUM SERPL-SCNC: 139 MMOL/L (ref 134–144)
SP GR UR: 1.01 (ref 1–1.03)
TRIGL SERPL-MCNC: 53 MG/DL (ref 0–149)
URINALYSIS REFLEX, 377202: NORMAL
UROBILINOGEN UR STRIP-MCNC: 0.2 MG/DL (ref 0.2–1)
VLDLC SERPL CALC-MCNC: 11 MG/DL (ref 5–40)
WBC # BLD AUTO: 7.1 X10E3/UL (ref 3.4–10.8)
WBC #/AREA URNS HPF: NORMAL /HPF

## 2017-12-02 DIAGNOSIS — I10 ESSENTIAL HYPERTENSION: ICD-10-CM

## 2017-12-03 RX ORDER — VALSARTAN 160 MG/1
TABLET ORAL
Qty: 30 TAB | Refills: 0 | Status: SHIPPED | OUTPATIENT
Start: 2017-12-03 | End: 2018-01-02 | Stop reason: SDUPTHER

## 2018-01-02 DIAGNOSIS — I10 ESSENTIAL HYPERTENSION: ICD-10-CM

## 2018-01-02 RX ORDER — VALSARTAN 160 MG/1
TABLET ORAL
Qty: 30 TAB | Refills: 0 | Status: SHIPPED | OUTPATIENT
Start: 2018-01-02 | End: 2018-01-31 | Stop reason: SDUPTHER

## 2018-01-31 DIAGNOSIS — I10 ESSENTIAL HYPERTENSION: ICD-10-CM

## 2018-02-01 RX ORDER — VALSARTAN 160 MG/1
TABLET ORAL
Qty: 30 TAB | Refills: 0 | Status: SHIPPED | OUTPATIENT
Start: 2018-02-01 | End: 2018-02-23 | Stop reason: SDUPTHER

## 2018-02-23 DIAGNOSIS — I10 ESSENTIAL HYPERTENSION: ICD-10-CM

## 2018-02-27 RX ORDER — VALSARTAN 160 MG/1
160 TABLET ORAL DAILY
Qty: 90 TAB | Refills: 1 | Status: SHIPPED | OUTPATIENT
Start: 2018-02-27 | End: 2018-07-18 | Stop reason: SDUPTHER

## 2018-02-27 NOTE — TELEPHONE ENCOUNTER
Orders Placed This Encounter    valsartan (DIOVAN) 160 mg tablet     Sig: Take 1 Tab by mouth daily.      Dispense:  90 Tab     Refill:  1     Please consider 90 day supplies to promote better adherence

## 2018-03-01 ENCOUNTER — OFFICE VISIT (OUTPATIENT)
Dept: INTERNAL MEDICINE CLINIC | Age: 68
End: 2018-03-01

## 2018-03-01 ENCOUNTER — HOSPITAL ENCOUNTER (OUTPATIENT)
Dept: GENERAL RADIOLOGY | Age: 68
Discharge: HOME | End: 2018-03-01
Payer: MEDICARE

## 2018-03-01 VITALS
SYSTOLIC BLOOD PRESSURE: 138 MMHG | DIASTOLIC BLOOD PRESSURE: 78 MMHG | HEIGHT: 67 IN | HEART RATE: 65 BPM | RESPIRATION RATE: 16 BRPM | BODY MASS INDEX: 30.35 KG/M2 | OXYGEN SATURATION: 97 % | TEMPERATURE: 99.7 F | WEIGHT: 193.4 LBS

## 2018-03-01 DIAGNOSIS — R68.89 FLU-LIKE SYMPTOMS: Primary | ICD-10-CM

## 2018-03-01 DIAGNOSIS — R05.9 COUGH: ICD-10-CM

## 2018-03-01 LAB
FLUAV+FLUBV AG NOSE QL IA.RAPID: NEGATIVE POS/NEG
FLUAV+FLUBV AG NOSE QL IA.RAPID: NEGATIVE POS/NEG
VALID INTERNAL CONTROL?: YES

## 2018-03-01 PROCEDURE — 71046 X-RAY EXAM CHEST 2 VIEWS: CPT

## 2018-03-01 RX ORDER — GUAIFENESIN AND DEXTROMETHORPHAN HYDROBROMIDE 1200; 60 MG/1; MG/1
1 TABLET, EXTENDED RELEASE ORAL
COMMUNITY
Start: 2018-03-01 | End: 2018-05-17 | Stop reason: ALTCHOICE

## 2018-03-01 RX ORDER — AZITHROMYCIN 250 MG/1
250 TABLET, FILM COATED ORAL SEE ADMIN INSTRUCTIONS
Qty: 6 TAB | Refills: 0 | Status: SHIPPED | OUTPATIENT
Start: 2018-03-01 | End: 2018-03-06

## 2018-03-01 NOTE — PROGRESS NOTES
Subjective:   Dina Kevin is a 69 y.o. male who complains of congestion, post nasal drip, productive cough and suspected fevers but not measured at home for 14 days, was better until last 24 hours and much worse since that time. He denies a history of shortness of breath, sweats, vomiting and wheezing. Evaluation to date: none. Treatment to date: none. Patient does not smoke cigarettes. Relevant PMH: No pertinent additional PMH. Patient Active Problem List    Diagnosis Date Noted    Advance directive discussed with patient 11/10/2016    OA (osteoarthritis) 08/19/2011    HTN (hypertension) 02/01/2011     Current Outpatient Prescriptions   Medication Sig Dispense Refill    Omega-3 Fatty Acids 60- mg cpDR Take  by mouth.  dextromethorphan-guaiFENesin (MUCINEX DM) 60-1,200 mg Tb12 Take 1 Tab by mouth two (2) times daily as needed.  valsartan (DIOVAN) 160 mg tablet Take 1 Tab by mouth daily. (Patient taking differently: Take 160 mg by mouth daily. Indications: hypertension) 90 Tab 1    CALCIUM PO Take  by mouth daily.  cholecalciferol, vitamin D3, (VITAMIN D3) 2,000 unit tab Take  by mouth.  cyanocobalamin (VITAMIN B12) 100 mcg tablet Take 100 mcg by mouth daily.  OTHER Osteomatric      aspirin delayed-release 81 mg tablet Take 81 mg by mouth daily.        No Known Allergies  Past Medical History:   Diagnosis Date    Hypertension     OA (osteoarthritis) 8/19/2011     Past Surgical History:   Procedure Laterality Date    HX CATARACT REMOVAL  1993    left     HX HIP REPLACEMENT  1/1999    Left    HX HIP REPLACEMENT  6/2000 and 10/2004    Right times 2    DC ANESTH,KNEE AREA SURGERY  1966     Family History   Problem Relation Age of Onset    Lung Disease Mother     Heart Disease Father     Hypertension Sister     Hypertension Brother     Hypertension Son     Hypertension Daughter      Social History   Substance Use Topics    Smoking status: Never Smoker    Smokeless tobacco: Never Used    Alcohol use 1.0 oz/week     2 Standard drinks or equivalent per week      Comment: socially         Review of Systems  Pertinent items are noted in HPI. Objective:     Visit Vitals    /78    Pulse 65    Temp 99.7 °F (37.6 °C) (Oral)    Resp 16    Ht 5' 7\" (1.702 m)    Wt 193 lb 6.4 oz (87.7 kg)    SpO2 97%    BMI 30.29 kg/m2     General:  alert, cooperative, no distress   Eyes: negative   Ears: normal TM's and external ear canals AU   Sinuses: Normal paranasal sinuses without tenderness   Mouth:  Lips, mucosa, and tongue normal. Teeth and gums normal   Neck: supple, symmetrical, trachea midline and no adenopathy. Heart: S1 and S2 normal, no murmurs noted. Lungs: Left lower lung field rhonchi. No wheeze   Abdomen: soft, non-tender. Bowel sounds normal. No masses,  no organomegaly        Assessment/Plan:   Likely pneumonia - will get CXR and antibiotics per findings. If negative, treat for bronchitis. Orders Placed This Encounter    XR CHEST PA LAT    AMB POC NICK INFLUENZA A/B TEST    Omega-3 Fatty Acids 60- mg cpDR    dextromethorphan-guaiFENesin (MUCINEX DM) 60-1,200 mg Tb12   Advised him to call back or return to office if symptoms worsen/change/persist.  Discussed expected course/resolution/complications of diagnosis in detail with patient. Medication risks/benefits/costs/interactions/alternatives discussed with patient. He was given an after visit summary which includes diagnoses, current medications, & vitals. He expressed understanding with the diagnosis and plan.

## 2018-03-01 NOTE — MR AVS SNAPSHOT
[address] [facility] [address] [place] 
[phone] Patient: Mary Alice Atwood MRN:  [id number] Visit Information Date & Time Provider Department Dept. Phone Encounter #  
 3/1/2018 11:30 AM Eligio Vanessa MD [address] Internal Medicine [phone] [id number] Your Appointments 5/9/2018  8:20 AM  
ESTABLISHED PATIENT with Umu Bey MD  
[facility] ([facility]) Appt Note: 1 year f/u  
 [address] [place]  
[address] [facility] [address]  
  
    
 11/19/2018  7:30 AM  
Medicare Physical with Eligio Vanessa MD  
[address] Internal Medicine [facility]) Appt Note: medicare wellness [address] [facility] [address]  
[address] [place] Upcoming Health Maintenance Date Due ZOSTER VACCINE AGE 60> 1/23/2008 MEDICARE YEARLY EXAM 11/14/2018 GLAUCOMA SCREENING Q2Y 1/24/2019 DTaP/Tdap/Td series (2 - Td) 8/20/2022 COLONOSCOPY 10/22/2023 Allergies as of 3/1/2018  Review Complete On: 3/1/2018 By: Jackie Arenas LPN No Known Allergies Current Immunizations  Reviewed on 9/21/2017 Name Date Influenza High Dose Vaccine PF 9/19/2017, 11/16/2016, 11/9/2015, 10/9/2013 Influenza Vaccine 11/11/2014 Influenza Vaccine Whole 11/12/2012 Pneumococcal Conjugate (PCV-13) 9/19/2017 Pneumococcal Polysaccharide (PPSV-23) 10/9/2013 TDAP Vaccine 8/20/2012 Not reviewed this visit You Were Diagnosed With   
  
 Codes Comments Flu-like symptoms    -  Primary ICD-10-CM: R68.89 ICD-9-CM: 780.99 Cough     ICD-10-CM: R05 ICD-9-CM: 117. 2 Vitals BP Pulse Temp Resp Height(growth percentile) Weight(growth percentile) 138/78 65 99.7 °F (37.6 °C) (Oral) 16 5' 7\" (1.702 m) 193 lb 6.4 oz (87.7 kg) SpO2 BMI Smoking Status 97% 30.29 kg/m2 Never Smoker Vitals History BMI and BSA Data Body Mass Index Body Surface Area  
 30.29 kg/m 2 2.04 m 2 Preferred Pharmacy Pharmacy Name Phone [facility] [address] [facility], Srav[facility] Found [phone] Your Updated Medication List  
  
   
This list is accurate as of 3/1/18 12:13 PM.  Always use your most recent med list.  
  
  
  
  
 aspirin delayed-release 81 mg tablet Take 81 mg by mouth daily. CALCIUM PO Take  by mouth daily. cyanocobalamin 100 mcg tablet Commonly known as:  VITAMIN B12 Take 100 mcg by mouth daily. MUCINEX DM 60-1,200 mg Tb12 Generic drug:  dextromethorphan-guaiFENesin Take 1 Tab by mouth two (2) times daily as needed. Omega-3 Fatty Acids 60- mg Cpdr  
Take  by mouth. OTHER Osteomatric  
  
 valsartan 160 mg tablet Commonly known as:  DIOVAN Take 1 Tab by mouth daily. VITAMIN D3 2,000 unit Tab Generic drug:  cholecalciferol (vitamin D3) Take  by mouth. We Performed the Following AMB POC NICK INFLUENZA A/B TEST [40675 CPT(R)] To-Do List   
 03/01/2018 Imaging:  XR CHEST PA LAT [facility] SERVICES! Dear Kelly Becerra: 
Thank you for requesting a milog account. Our records indicate that you already have an active milog account. You can access your account anytime at [email or web address] Did you know that you can access your hospital and ER discharge instructions at any time in milog? You can also review all of your test results from your hospital stay or ER visit. Additional Information If you have questions, please visit the Frequently Asked Questions section of the milog website at [email or web address] Remember, MyChart is NOT to be used for urgent needs. For medical emergencies, dial 911. Now available from your iPhone and Android! Please provide this summary of care documentation to your next provider. Your primary care clinician is listed as Pavan [phone] If you have any questions after today's visit, please call [phone].

## 2018-03-14 ENCOUNTER — TELEPHONE (OUTPATIENT)
Dept: INTERNAL MEDICINE CLINIC | Age: 68
End: 2018-03-14

## 2018-03-14 NOTE — TELEPHONE ENCOUNTER
----- Message from Delana Lefort, MD sent at 3/1/2018  1:20 PM EST -----  Notify negative. Sent antibiotics for bronchitis.

## 2018-03-14 NOTE — TELEPHONE ENCOUNTER
Called and spoke to pt- he is feeling much better. Finished all abx and back working. To return to clinic as needed.

## 2018-05-09 ENCOUNTER — OFFICE VISIT (OUTPATIENT)
Dept: CARDIOLOGY CLINIC | Age: 68
End: 2018-05-09

## 2018-05-09 VITALS
SYSTOLIC BLOOD PRESSURE: 120 MMHG | HEIGHT: 67 IN | BODY MASS INDEX: 29.91 KG/M2 | HEART RATE: 62 BPM | WEIGHT: 190.6 LBS | OXYGEN SATURATION: 99 % | DIASTOLIC BLOOD PRESSURE: 80 MMHG | RESPIRATION RATE: 16 BRPM

## 2018-05-09 DIAGNOSIS — I10 ESSENTIAL HYPERTENSION: Primary | ICD-10-CM

## 2018-05-09 NOTE — PROGRESS NOTES
HISTORY OF PRESENT ILLNESS  Bertha Anderson is a 77 y.o. male. Patient with h/o HTN seen here for evaluation of HTN and bradycardia  ECHO on 4/17:Left ventricle: Systolic function was normal. Ejection fraction was  estimated in the range of 55 % to 60 %. There were no regional wall motion  abnormalities. Mitral valve: There was mild regurgitation. Aortic valve: Normal valve structure. The valve was trileaflet. Leaflets  exhibited mildly increased thickness, normal cuspal separation, and  sclerosis. Tricuspid valve: There was mild regurgitation. HOLTER on 5/9/17: sinus bradycardia  with average of 58 frequent pacs and rare pvcs, 8 episodes of possible PAT with the longest of 31 beats at 165          Past Medical History:   Diagnosis Date    Hypertension       OA (osteoarthritis) 8/19/2011                 Past Surgical History:   Procedure Laterality Date    HX CATARACT REMOVAL    1993      left     HX HIP REPLACEMENT    1/1999      Left    HX HIP REPLACEMENT    6/2000 and 10/2004      Right times 2    LA ANESTH,KNEE AREA SURGERY    1966       Social History                 Social History    Marital status:          Spouse name: N/A    Number of children: N/A    Years of education: N/A             Occupational History    Not on file.                     Social History Main Topics    Smoking status: Never Smoker    Smokeless tobacco: Never Used    Alcohol use 1.0 oz/week           2 Standard drinks or equivalent per week              Comment: socially     Drug use: No    Sexual activity: Yes         Partners: Female               Other Topics Concern    Not on file       Social History Narrative                 Family History   Problem Relation Age of Onset    Lung Disease Mother       Heart Disease Father       Hypertension Sister       Hypertension Brother       Hypertension Son       Hypertension Daughter          HPI  He Is doing very well.   He denies any chest pain chest discomfort similar. No shortness of breath orthopnea PND. No palpitation presyncopal syncopal episodes. Review of Systems   Constitutional: Negative. Respiratory: Negative. Cardiovascular: Negative. Physical Exam  Lab Results   Component Value Date/Time    Sodium 139 11/13/2017 10:34 AM    Potassium 4.8 11/13/2017 10:34 AM    Chloride 100 11/13/2017 10:34 AM    CO2 25 11/13/2017 10:34 AM    Anion gap 7 07/21/2010 11:05 AM    Glucose 88 11/13/2017 10:34 AM    BUN 15 11/13/2017 10:34 AM    Creatinine 0.90 11/13/2017 10:34 AM    BUN/Creatinine ratio 17 11/13/2017 10:34 AM    GFR est  11/13/2017 10:34 AM    GFR est non-AA 87 11/13/2017 10:34 AM    Calcium 9.5 11/13/2017 10:34 AM     Lab Results   Component Value Date/Time    WBC 7.1 11/13/2017 10:34 AM    HGB 15.1 11/13/2017 10:34 AM    HCT 43.9 11/13/2017 10:34 AM    PLATELET 032 44/66/7574 10:34 AM    MCV 83 11/13/2017 10:34 AM     Lab Results   Component Value Date/Time    Cholesterol, total 181 11/13/2017 10:34 AM    HDL Cholesterol 64 11/13/2017 10:34 AM    LDL, calculated 106 (H) 11/13/2017 10:34 AM    VLDL, calculated 11 11/13/2017 10:34 AM    Triglyceride 53 11/13/2017 10:34 AM    CHOL/HDL Ratio 2.9 07/21/2010 11:05 AM     Lab Results   Component Value Date/Time    TSH 3.040 05/11/2017 07:48 AM         ASSESSMENT and PLAN  HTN: His blood pressure is well controlled on valsartan. He has changed some of his diet to reduce the intake of salt. He continues to exercise routinely. No additional changes at this time. Bradycardia: his electrocardiogram today reveals a normal sinus rhythm with occasional to frequent isolated premature atrial contractions. No significant ST-T wave abnormalities are noted. He likely has minimal conduction disease but at this point is really no need for further intervention.    Stress test on the back burner for now in this asymptomatic patient, but we have discussed potentially proceeding with a stress echocardiogram at the next office visit or a calcium score if he remains asymptomatic. He has noticed some excessive bruising. I suspect that is related to a combination of aspirin and fish oil. I have discussed with him the potential stopping fish oil. He will discuss that with his primary care physician as well.     Otherwise if all ok I will see back in 1 year unless of occurring issues

## 2018-05-09 NOTE — MR AVS SNAPSHOT
[address] [facility] [address] [facility] 
[phone] Patient: Farzad Mariscal MRN:  [id number] Visit Information Date & Time Provider Department Dept. Phone Encounter #  
 5/9/2018  8:20 AM Taylor Schneider MD [facility] [phone] [id number] Your Appointments 5/17/2018  8:15 AM  
ROUTINE CARE with Kaylynn Haro MD  
[facility] Internal Medicine [address] [place] [address]) Appt Note: pre op eye surgery 5/30/18  
 [address] [facility] [address]  
[address] [place]  
  
    
 11/19/2018  7:30 AM  
Medicare Physical with Kaylynn Haro MD  
[facility] Internal Medicine [address] [place] [address]) Appt Note: medicare wellness [address] [facility] [address]  
[address] [facility] [place] [facility] Upcoming Health Maintenance Date Due ZOSTER VACCINE AGE 60> 1/23/2008 Influenza Age 5 to Adult 8/1/2018 MEDICARE YEARLY EXAM 11/14/2018 GLAUCOMA SCREENING Q2Y 4/17/2020 DTaP/Tdap/Td series (2 - Td) 8/20/2022 COLONOSCOPY 10/22/2023 Allergies as of 5/9/2018  Review Complete On: 3/1/2018 By: Kaylynn Haro MD  
 No Known Allergies Current Immunizations  Reviewed on 9/21/2017 Name Date Influenza High Dose Vaccine PF 9/19/2017, 11/16/2016, 11/9/2015, 10/9/2013 Influenza Vaccine 11/11/2014 Influenza Vaccine Whole 11/12/2012 Pneumococcal Conjugate (PCV-13) 9/19/2017 Pneumococcal Polysaccharide (PPSV-23) 10/9/2013 TDAP Vaccine 8/20/2012 Not reviewed this visit You Were Diagnosed With   
  
 Codes Comments Essential hypertension    -  Primary ICD-10-CM: I10 
ICD-9-CM: 401.9 Vitals BP Pulse Resp Height(growth percentile) Weight(growth percentile) SpO2 120/80 (BP 1 Location: Left arm, BP Patient Position: Sitting) 62 16 5' 7\" (1.702 m) 190 lb 9.6 oz (86.5 kg) 99% BMI Smoking Status 29.85 kg/m2 Never Smoker Vitals History BMI and BSA Data Body Mass Index Body Surface Area  
 29.85 kg/m 2 2.02 m 2 Preferred Pharmacy Pharmacy Name Phone [facility] [address] [facility], Sravan Thakur [phone] Your Updated Medication List  
  
   
This list is accurate as of 5/9/18  8:55 AM.  Always use your most recent med list.  
  
  
  
  
 aspirin delayed-release 81 mg tablet Take 81 mg by mouth daily. CALCIUM PO Take  by mouth daily. cyanocobalamin 100 mcg tablet Commonly known as:  VITAMIN B12 Take 100 mcg by mouth daily. MUCINEX DM 60-1,200 mg Tb12 Generic drug:  dextromethorphan-guaiFENesin Take 1 Tab by mouth two (2) times daily as needed. Omega-3 Fatty Acids 60- mg Cpdr  
Take  by mouth. OTHER Osteomatric  
  
 valsartan 160 mg tablet Commonly known as:  DIOVAN Take 1 Tab by mouth daily. VITAMIN D3 2,000 unit Tab Generic drug:  cholecalciferol (vitamin D3) Take  by mouth. We Performed the Following AMB POC EKG ROUTINE W/ 12 LEADS, INTER & REP [49056 CPT(R)] Patient Instructions You will need to follow up in clinic with Dr. Vern Phalen in 1 year. Introducing [facility] & HEALTH SERVICES! Dear Greg Guerra: 
Thank you for requesting a theAudience account. Our records indicate that you already have an active theAudience account. You can access your account anytime at [email or web address] Did you know that you can access your hospital and ER discharge instructions at any time in theAudience? You can also review all of your test results from your hospital stay or ER visit. Additional Information If you have questions, please visit the Frequently Asked Questions section of the Addashop website at [email or web address] Remember, Addashop is NOT to be used for urgent needs. For medical emergencies, dial 911. Now available from your iPhone and Android! Please provide this summary of care documentation to your next provider. Your primary care clinician is listed as Pavan [phone] If you have any questions after today's visit, please call [phone].

## 2018-05-17 ENCOUNTER — OFFICE VISIT (OUTPATIENT)
Dept: INTERNAL MEDICINE CLINIC | Age: 68
End: 2018-05-17

## 2018-05-17 VITALS
BODY MASS INDEX: 29.51 KG/M2 | WEIGHT: 188 LBS | HEIGHT: 67 IN | HEART RATE: 48 BPM | SYSTOLIC BLOOD PRESSURE: 128 MMHG | TEMPERATURE: 98.8 F | DIASTOLIC BLOOD PRESSURE: 80 MMHG | OXYGEN SATURATION: 99 % | RESPIRATION RATE: 14 BRPM

## 2018-05-17 DIAGNOSIS — H25.9 SENILE CATARACT OF RIGHT EYE, UNSPECIFIED AGE-RELATED CATARACT TYPE: Primary | ICD-10-CM

## 2018-05-17 DIAGNOSIS — I10 ESSENTIAL HYPERTENSION: ICD-10-CM

## 2018-05-17 DIAGNOSIS — M17.0 PRIMARY OSTEOARTHRITIS OF BOTH KNEES: ICD-10-CM

## 2018-05-17 RX ORDER — MULTIVIT WITH MINERALS/HERBS
1 TABLET ORAL DAILY
COMMUNITY
End: 2018-11-19 | Stop reason: ALTCHOICE

## 2018-05-17 RX ORDER — LYSINE HCL 500 MG
TABLET ORAL
COMMUNITY
End: 2018-11-19 | Stop reason: ALTCHOICE

## 2018-05-17 RX ORDER — SILDENAFIL 100 MG/1
100 TABLET, FILM COATED ORAL AS NEEDED
Qty: 2 TAB | Refills: 0 | Status: SHIPPED | COMMUNITY
Start: 2018-05-17 | End: 2019-05-10

## 2018-05-17 NOTE — PATIENT INSTRUCTIONS
As discussed in your appointment today, [address] is an important part of planning for your healthcare future. Discussing your preferences with your family and your care team is a part of good healthcare so that we can be guided by your known values and goals. Our office offers this service for you at your convenience. Our Nurse Navigators and certified Respecting Choices ® Facilitators, Lucia Watts and Ismael Bacon typically schedule family appointments for this service on Wednesdays. To schedule an Advance Care Planning visit or to receive more information about this service, please call [facility] Internal Medicine at [phone] and ask to speak directly to David Pryor or Yue De León. Advance Care Planning: Care Instructions  Your Care Instructions  It can be hard to live with an illness that cannot be cured. But if your health is getting worse, you may want to make decisions about end-of-life care. Planning for the end of your life does not mean that you are giving up. It is a way to make sure that your wishes are met. Clearly stating your wishes can make it easier for your loved ones. Making plans while you are still able may also ease your mind and make your final days less stressful and more meaningful. Follow-up care is a key part of your treatment and safety. Be sure to make and go to all appointments, and call your doctor if you are having problems. It's also a good idea to know your test results and keep a list of the medicines you take. What can you do to plan for the end of life? · You can bring these issues up with your doctor. You do not need to wait until your doctor starts the conversation. You might start with \"I would not be willing to live with . Frutoso Golder Frutoso Golder Frutoso Golder \" When you complete this sentence it helps your doctor understand your wishes. · Talk openly and honestly with your doctor. This is the best way to understand the decisions you will need to make as your health changes.  Know that you can always change your mind. · Ask your doctor about commonly used life-support measures. These include tube feedings, breathing machines, and fluids given through a vein (IV). Understanding these treatments will help you decide whether you want them. · You may choose to have these life-supporting treatments for a limited time. This allows a trial period to see whether they will help you. You may also decide that you want your doctor to take only certain measures to keep you alive. It is important to spell out these conditions so that your doctor and family understand them. · Talk to your doctor about how long you are likely to live. He or she may be able to give you an idea of what usually happens with your specific illness. · Think about preparing papers that state your wishes. This way there will not be any confusion about what you want. You can change your instructions at any time. Which papers should you prepare? Advance directives are legal papers that tell doctors how you want to be cared for at the end of your life. You do not need a  to write these papers. Ask your doctor or your state health department for information on how to write your advance directives. They may have the forms for each of these types of papers. Make sure your doctor has a copy of these on file, and give a copy to a family member or close friend. · Consider a do-not-resuscitate order (DNR). This order asks that no extra treatments be done if your heart stops or you stop breathing. Extra treatments may include electrical shock to restart your heart or a machine to breathe for you. If you decide to have a DNR order, ask your doctor to explain and write it. Place the order in your home where everyone can easily see it. · Consider a living will. A living will explains your wishes in case you are in a coma or cannot communicate. Living ignacio tell doctors to use or not use treatments that would keep you alive.  You must have one or two witnesses or a notary present when you sign this form. · Consider a durable power of . This allows you to name a person to make decisions about your care if you are not able to. Most people ask a close friend or family member. Talk to this person about the kinds of treatments you want and those that you do not want. Make sure this person understands your wishes. If this person is not the health care agent named in your advance directive, also talk with your health care agent. These legal papers are simple to change. Tell your doctor what you want to change, and ask him or her to make a note in your medical file. Give your family updated copies of the papers.

## 2018-05-17 NOTE — PROGRESS NOTES
Preoperative Evaluation    Date of Exam: 2018    To Patino is a 77 y.o. male (:1950) who presents for preoperative evaluation. Procedure/Surgery:Right Eye Cataract  Date of Procedure/Surgery: 18  Surgeon: John Parsons MD  Hospital/Surgical Facility: [address] [place] [address]  Primary Physician: Aby [facility] MD  Latex Allergy: no    Problem List:     Patient Active Problem List    Diagnosis Date Noted    Advance directive discussed with patient 11/10/2016    OA (osteoarthritis) 2011    HTN (hypertension) 2011     Medical History:     Past Medical History:   Diagnosis Date    Cataract     Hypertension     OA (osteoarthritis) 2011     Allergies:   No Known Allergies   Medications:     Current Outpatient Prescriptions   Medication Sig    Calcium Carbonate-Vit D3-Min 600 mg calcium- 400 unit tab Take  by mouth.  b complex vitamins tablet Take 1 Tab by mouth daily.  valsartan (DIOVAN) 160 mg tablet Take 1 Tab by mouth daily. (Patient taking differently: Take 160 mg by mouth daily. Indications: hypertension)    OTHER Osteomatric    aspirin delayed-release 81 mg tablet Take 81 mg by mouth daily. No current facility-administered medications for this visit.       Surgical History:     Past Surgical History:   Procedure Laterality Date    HX CATARACT REMOVAL Left     left     HX HIP REPLACEMENT  1999    Left    HX HIP REPLACEMENT  2000 and 10/2004    Right times 2    OR ANESTH,KNEE AREA SURGERY       Social History:     Social History     Social History    Marital status:      Spouse name: N/A    Number of children: N/A    Years of education: N/A     Social History Main Topics    Smoking status: Never Smoker    Smokeless tobacco: Never Used    Alcohol use 1.0 oz/week     2 Standard drinks or equivalent per week      Comment: socially     Drug use: No    Sexual activity: Yes     Partners: Female     Other Topics Concern  None     Social History Narrative       Recent use of: ASA    Tetanus up to date: last tetanus booster within 10 years      Anesthesia Complications: None  History of abnormal bleeding : None  History of Blood Transfusions: no  Health Care Directive or Living Will: no    REVIEW OF SYSTEMS:  A comprehensive review of systems was negative except for: Eyes: positive for contacts/glasses and cataracts  Respiratory: positive for negative  Cardiovascular: positive for none  Some ongoing issues with erectile dysfunction. He has not tried drugs in a while. EXAM:   Visit Vitals    /80    Pulse (!) 48    Temp 98.8 °F (37.1 °C) (Oral)    Resp 14    Ht 5' 7\" (1.702 m)    Wt 188 lb (85.3 kg)    SpO2 99%    BMI 29.44 kg/m2     General appearance - alert, well appearing, and in no distress  Eyes - left eye normal, cataracts noted right eye only. Mouth - mucous membranes moist, pharynx normal without lesions  Neck - supple, no significant adenopathy  Lymphatics - no palpable lymphadenopathy, no hepatosplenomegaly  Chest - clear to auscultation, no wheezes, rales or rhonchi, symmetric air entry  Heart - normal rate, regular rhythm, normal S1, S2, no murmurs, rubs, clicks or gallops  Abdomen - soft, nontender, nondistended, no masses or organomegaly  Neurological - alert, oriented, normal speech, no focal findings or movement disorder noted  Musculoskeletal - no joint tenderness, deformity or swelling  Extremities - peripheral pulses normal, no pedal edema, no clubbing or cyanosis        IMPRESSION:   1. Hypertensionvery well controlled. Tolerating medication well. 2.  Osteoarthritisstable  3. Cataractsstable for surgery. 4.  EDlong discussion about medication today. In the past there is been an issue about cost.  I did give him a sample of Viagra 100 mg and asked him to try this. If it is successful he will let me know and we will give him sildenafil 20 mg generic to use as needed.   Plan -   No contraindications to planned surgery  Orders Placed This Encounter    Calcium Carbonate-Vit D3-Min 600 mg calcium- 400 unit tab     Sig: Take  by mouth.  b complex vitamins tablet     Sig: Take 1 Tab by mouth daily.  sildenafil citrate (VIAGRA) 100 mg tablet     Sig: Take 1 Tab by mouth as needed.      Dispense:  2 Tab     Refill:  0     Order Specific Question:   Expiration Date     Answer:   7/31/2019     Order Specific Question:   Lot#     Answer:   [id number]     Order Specific Question:        Answer:   Pfizer     Order Specific Question:   NDC#     Answer:   23267-159-87         Matthew Moore MD   5/17/2018

## 2018-07-18 ENCOUNTER — TELEPHONE (OUTPATIENT)
Dept: INTERNAL MEDICINE CLINIC | Age: 68
End: 2018-07-18

## 2018-07-18 DIAGNOSIS — I10 ESSENTIAL HYPERTENSION: ICD-10-CM

## 2018-07-18 RX ORDER — VALSARTAN 160 MG/1
160 TABLET ORAL DAILY
Qty: 90 TAB | Refills: 1 | Status: SHIPPED | OUTPATIENT
Start: 2018-07-18 | End: 2018-07-18 | Stop reason: ALTCHOICE

## 2018-07-18 RX ORDER — LOSARTAN POTASSIUM 100 MG/1
100 TABLET ORAL DAILY
Qty: 90 TAB | Refills: 0 | Status: SHIPPED | OUTPATIENT
Start: 2018-07-18 | End: 2018-10-12 | Stop reason: SDUPTHER

## 2018-07-18 NOTE — TELEPHONE ENCOUNTER
Orders Placed This Encounter    valsartan (DIOVAN) 160 mg tablet     Sig: Take 1 Tab by mouth daily. Dispense:  90 Tab     Refill:  1     The above medication refills were approved via verbal order by Dr. Nayely Cuadra III.

## 2018-07-18 NOTE — TELEPHONE ENCOUNTER
Yanet Clayton (Self) [phone]     Pt says that he called his pharm about his losartan meds, but that could not tell him anything and he does not want to take this med.

## 2018-10-12 RX ORDER — LOSARTAN POTASSIUM 100 MG/1
TABLET ORAL
Qty: 90 TAB | Refills: 0 | Status: SHIPPED | OUTPATIENT
Start: 2018-10-12 | End: 2018-11-19 | Stop reason: ALTCHOICE

## 2018-11-19 ENCOUNTER — HOSPITAL ENCOUNTER (OUTPATIENT)
Dept: LAB | Age: 68
Discharge: HOME | End: 2018-11-19
Payer: MEDICARE

## 2018-11-19 ENCOUNTER — OFFICE VISIT (OUTPATIENT)
Dept: INTERNAL MEDICINE CLINIC | Age: 68
End: 2018-11-19

## 2018-11-19 VITALS
TEMPERATURE: 98.5 F | HEIGHT: 67 IN | HEART RATE: 56 BPM | OXYGEN SATURATION: 100 % | WEIGHT: 194 LBS | SYSTOLIC BLOOD PRESSURE: 142 MMHG | DIASTOLIC BLOOD PRESSURE: 84 MMHG | BODY MASS INDEX: 30.45 KG/M2 | RESPIRATION RATE: 14 BRPM

## 2018-11-19 DIAGNOSIS — Z00.00 MEDICARE ANNUAL WELLNESS VISIT, SUBSEQUENT: Primary | ICD-10-CM

## 2018-11-19 DIAGNOSIS — I47.1 ATRIAL TACHYCARDIA (HCC): ICD-10-CM

## 2018-11-19 DIAGNOSIS — M17.0 PRIMARY OSTEOARTHRITIS OF BOTH KNEES: ICD-10-CM

## 2018-11-19 DIAGNOSIS — Z12.5 PROSTATE CANCER SCREENING: ICD-10-CM

## 2018-11-19 DIAGNOSIS — I10 ESSENTIAL HYPERTENSION: ICD-10-CM

## 2018-11-19 PROCEDURE — 84443 ASSAY THYROID STIM HORMONE: CPT

## 2018-11-19 PROCEDURE — 84153 ASSAY OF PSA TOTAL: CPT

## 2018-11-19 PROCEDURE — 85025 COMPLETE CBC W/AUTO DIFF WBC: CPT

## 2018-11-19 PROCEDURE — 80053 COMPREHEN METABOLIC PANEL: CPT

## 2018-11-19 PROCEDURE — 36415 COLL VENOUS BLD VENIPUNCTURE: CPT

## 2018-11-19 PROCEDURE — 80061 LIPID PANEL: CPT

## 2018-11-19 RX ORDER — BISMUTH SUBSALICYLATE 262 MG
1 TABLET,CHEWABLE ORAL DAILY
COMMUNITY
End: 2019-05-10

## 2018-11-19 RX ORDER — OLMESARTAN MEDOXOMIL 40 MG/1
40 TABLET ORAL DAILY
Qty: 30 TAB | Refills: 5 | Status: SHIPPED | OUTPATIENT
Start: 2018-11-19 | End: 2019-03-08 | Stop reason: SINTOL

## 2018-11-19 RX ORDER — TRIAMCINOLONE ACETONIDE 1 MG/G
CREAM TOPICAL
Qty: 80 G | Refills: 1 | Status: SHIPPED | OUTPATIENT
Start: 2018-11-19 | End: 2022-03-22

## 2018-11-19 NOTE — PROGRESS NOTES
This is the Subsequent Medicare Annual Wellness Exam, performed 12 months or more after the Initial AWV or the last Subsequent AWV I have reviewed the patient's medical history in detail and updated the computerized patient record. As well as for follow-up of his health issues. He has been generally doing well. His blood pressure has been elevated off and on. He has had minimal lightheadedness. He does note some itching on his skin. The previous ulcer he had on his ankle is much better but he does have dry scaly skin layer that itches at times. He continues to have some arthritis issues but he feels are reasonably controlled with as needed Advil. History Past Medical History:  
Diagnosis Date  Hypertension  OA (osteoarthritis) 8/19/2011 Past Surgical History:  
Procedure Laterality Date  HX CATARACT REMOVAL Left 1993  
 left  HX CATARACT REMOVAL Right 2018  HX HIP REPLACEMENT Left 01/1999  HX HIP REPLACEMENT Right Right x2  
[facility] SURGERY  1966 Current Outpatient Medications Medication Sig Dispense Refill  multivitamin (ONE A DAY) tablet Take 1 Tab by mouth daily.  losartan (COZAAR) 100 mg tablet TAKE 1 TABLET BY MOUTH ONCE DAILY 90 Tab 0  
 sildenafil citrate (VIAGRA) 100 mg tablet Take 1 Tab by mouth as needed. 2 Tab 0  
 aspirin delayed-release 81 mg tablet Take 81 mg by mouth daily. No Known Allergies Family History Problem Relation Age of Onset  Lung Disease Mother  Heart Disease Father  Hypertension Sister  Hypertension Brother  Hypertension Son   
Roscoe.Gayla Hypertension Daughter Social History Tobacco Use  Smoking status: Never Smoker  Smokeless tobacco: Never Used Substance Use Topics  Alcohol use: Yes Alcohol/week: 1.0 oz Types: 2 Standard drinks or equivalent per week Frequency: Monthly or less Drinks per session: 1 or 2 Binge frequency: Never Comment: socially Patient Active Problem List  
Diagnosis Code  
 HTN (hypertension) I10  
 OA (osteoarthritis) M19.90  
 Advance directive discussed with patient Z71.89  
ROS - Per HPI Physical Examination: General appearance - alert, well appearing, and in no distress Eyes - pupils equal and reactive, extraocular eye movements intact Ears - bilateral TM's and external ear canals normal 
Nose - normal and patent, no erythema, discharge or polyps Mouth - mucous membranes moist, pharynx normal without lesions Neck - supple, no significant adenopathy Lymphatics - no palpable lymphadenopathy, no hepatosplenomegaly Chest - clear to auscultation, no wheezes, rales or rhonchi, symmetric air entry Heart - normal rate, regular rhythm, normal S1, S2, no murmurs, rubs, clicks or gallops Abdomen - soft, nontender, nondistended, no masses or organomegaly Back exam - full range of motion, no tenderness, palpable spasm or pain on motion Neurological - alert, oriented, normal speech, no focal findings or movement disorder noted Musculoskeletal -there is decreased range of motion in the shoulders. Some decreased range of motion in the knees and hips as well. No joint effusions. No erythema. Extremities - peripheral pulses normal, no pedal edema, no clubbing or cyanosis Skin -does have a patch of dry scaly skin with some mild erythema on the medial right ankle. No evidence of secondary infection. Depression Risk Factor Screening: PHQ over the last two weeks 3/1/2018 Little interest or pleasure in doing things Not at all Feeling down, depressed, irritable, or hopeless Not at all Total Score PHQ 2 0 Alcohol Risk Factor Screening: You do not drink alcohol or very rarely. Functional Ability and Level of Safety:  
Hearing Loss Hearing is good. Left ear hearing is impaired. He is going to evaluate for hearing aids. Activities of Daily Living The home contains: no safety equipment. Patient does total self care Fall Risk Fall Risk Assessment, last 12 mths 3/1/2018 Able to walk? Yes Fall in past 12 months? No  
Fall with injury? -  
Number of falls in past 12 months - Fall Risk Score -  
 
 
Abuse Screen Patient is not abused Cognitive Screening Evaluation of Cognitive Function: 
Has your family/caregiver stated any concerns about your memory: no 
 
 
Patient Care Team  
Patient Care Team: 
No Walsh MD as PCP - Leann English MD (Cardiology) Assessment/Plan Education and counseling provided: 
Are appropriate based on today's review and evaluation End-of-Life planning (with patient's consent) Prostate cancer screening tests (PSA, covered annually) Diabetes screening test 
 
Diagnoses and all orders for this visit: 
 
1. Medicare annual wellness visit, subsequent 2. Essential hypertension -not well controlled. At this point will increase his medication to using Benicar 40 mg a day and monitor his blood pressure and see how it goes. -     CBC WITH AUTOMATED DIFF 
-     METABOLIC PANEL, COMPREHENSIVE 
-     LIPID PANEL 
-     TSH RFX ON ABNORMAL TO FREE T4 
 
3. Primary osteoarthritis of both knees -Advil as needed and will see orthopedics as needed. 4. Atrial tachycardia ([address] -resolved. -     TSH RFX ON ABNORMAL TO FREE T4 
 
5. Prostate cancer screening -     PSA SCREENING (SCREENING) 6.  Stasis dermatitiswe will treat with topical steroids as needed. Other orders 
-     varicella-zoster recombinant, PF, (SHINGRIX, PF,) 50 mcg/0.5 mL susr injection; 0.5 mL by IntraMUSCular route once for 1 dose. -     triamcinolone acetonide (KENALOG) 0.1 % topical cream; Apply  to affected area two (2) times daily as needed for Skin Irritation. use thin layer 
-     olmesartan (BENICAR) 40 mg tablet; Take 1 Tab by mouth daily. Health Maintenance Due Topic Date Due  Shingrix Vaccine Age 50> (1 of 2) 03/23/1998  MEDICARE YEARLY EXAM  11/14/2018

## 2018-11-19 NOTE — PATIENT INSTRUCTIONS
Medicare Wellness Visit, Male The best way to live healthy is to have a lifestyle where you eat a well-balanced diet, exercise regularly, limit alcohol use, and quit all forms of tobacco/nicotine, if applicable. Regular preventive services are another way to keep healthy. Preventive services (vaccines, screening tests, monitoring & exams) can help personalize your care plan, which helps you manage your own care. Screening tests can find health problems at the earliest stages, when they are easiest to treat. [address] [facility] follows the current, evidence-based guidelines published by the [facility]abhakar Wilfredo ([facility]) when recommending preventive services for our patients. Because we follow these guidelines, sometimes recommendations change over time as research supports it. (For example, a prostate screening blood test is no longer routinely recommended for men with no symptoms.) Of course, you and your doctor may decide to screen more often for some diseases, based on your risk and co-morbidities (chronic disease you are already diagnosed with). Preventive services for you include: - Medicare offers their members a free annual wellness visit, which is time for you and your primary care provider to discuss and plan for your preventive service needs. Take advantage of this benefit every year! 
-All adults over age 72 should receive the recommended pneumonia vaccines. Current USPSTF guidelines recommend a series of two vaccines for the best pneumonia protection.  
-All adults should have a flu vaccine yearly and an ECG.  All adults age 61 and older should receive a shingles vaccine once in their lifetime.   
-All adults age 38-68 who are overweight should have a diabetes screening test once every three years.  
-Other screening tests & preventive services for persons with diabetes include: an eye exam to screen for diabetic retinopathy, a kidney function test, a foot exam, and stricter control over your cholesterol.  
-Cardiovascular screening for adults with routine risk involves an electrocardiogram (ECG) at intervals determined by the provider.  
-Colorectal cancer screening should be done for adults age 54-65 with no increased risk factors for colorectal cancer. There are a number of acceptable methods of screening for this type of cancer. Each test has its own benefits and drawbacks. Discuss with your provider what is most appropriate for you during your annual wellness visit. The different tests include: colonoscopy (considered the best screening method), a fecal occult blood test, a fecal DNA test, and sigmoidoscopy. 
-All adults born between [facility] should be screened once for Hepatitis C. 
-An Abdominal Aortic Aneurysm (AAA) Screening is recommended for men age 73-68 who has ever smoked in their lifetime. Here is a list of your current Health Maintenance items (your personalized list of preventive services) with a due date: 
Health Maintenance Due Topic Date Due  Shingles Vaccine (1 of 2) 03/23/1998 Luis Singletary Annual Well Visit  11/14/2018

## 2018-11-20 ENCOUNTER — TELEPHONE (OUTPATIENT)
Dept: INTERNAL MEDICINE CLINIC | Age: 68
End: 2018-11-20

## 2018-11-20 DIAGNOSIS — R97.20 INCREASED PROSTATE SPECIFIC ANTIGEN (PSA) VELOCITY: Primary | ICD-10-CM

## 2018-11-20 LAB
ALBUMIN SERPL-MCNC: 4.4 G/DL (ref 3.5–4.8)
ALBUMIN/GLOB SERPL: 2 {RATIO} (ref 1.2–2.2)
ALP SERPL-CCNC: 53 IU/L (ref 39–117)
ALT SERPL-CCNC: 27 IU/L (ref 0–44)
AST SERPL-CCNC: 32 IU/L (ref 0–40)
BASOPHILS # BLD AUTO: 0.1 X10E3/UL (ref 0–0.2)
BASOPHILS NFR BLD AUTO: 1 %
BILIRUB SERPL-MCNC: 0.4 MG/DL (ref 0–1.2)
BUN SERPL-MCNC: 14 MG/DL (ref 8–27)
BUN/CREAT SERPL: 14 (ref 10–24)
CALCIUM SERPL-MCNC: 9.4 MG/DL (ref 8.6–10.2)
CHLORIDE SERPL-SCNC: 103 MMOL/L (ref 96–106)
CHOLEST SERPL-MCNC: 173 MG/DL (ref 100–199)
CO2 SERPL-SCNC: 28 MMOL/L (ref 20–29)
CREAT SERPL-MCNC: 0.97 MG/DL (ref 0.76–1.27)
EOSINOPHIL # BLD AUTO: 0.2 X10E3/UL (ref 0–0.4)
EOSINOPHIL NFR BLD AUTO: 4 %
ERYTHROCYTE [DISTWIDTH] IN BLOOD BY AUTOMATED COUNT: 14.5 % (ref 12.3–15.4)
GLOBULIN SER CALC-MCNC: 2.2 G/DL (ref 1.5–4.5)
GLUCOSE SERPL-MCNC: 90 MG/DL (ref 65–99)
HCT VFR BLD AUTO: 46 % (ref 37.5–51)
HDLC SERPL-MCNC: 63 MG/DL
HGB BLD-MCNC: 15.8 G/DL (ref 13–17.7)
IMM GRANULOCYTES # BLD: 0 X10E3/UL (ref 0–0.1)
IMM GRANULOCYTES NFR BLD: 1 %
LDLC SERPL CALC-MCNC: 96 MG/DL (ref 0–99)
LYMPHOCYTES # BLD AUTO: 1.7 X10E3/UL (ref 0.7–3.1)
LYMPHOCYTES NFR BLD AUTO: 26 %
MCH RBC QN AUTO: 29.8 PG (ref 26.6–33)
MCHC RBC AUTO-ENTMCNC: 34.3 G/DL (ref 31.5–35.7)
MCV RBC AUTO: 87 FL (ref 79–97)
MONOCYTES # BLD AUTO: 0.6 X10E3/UL (ref 0.1–0.9)
MONOCYTES NFR BLD AUTO: 9 %
NEUTROPHILS # BLD AUTO: 4 X10E3/UL (ref 1.4–7)
NEUTROPHILS NFR BLD AUTO: 59 %
PLATELET # BLD AUTO: 313 X10E3/UL (ref 150–379)
POTASSIUM SERPL-SCNC: 5.3 MMOL/L (ref 3.5–5.2)
PROT SERPL-MCNC: 6.6 G/DL (ref 6–8.5)
PSA SERPL-MCNC: 2.8 NG/ML (ref 0–4)
RBC # BLD AUTO: 5.3 X10E6/UL (ref 4.14–5.8)
SODIUM SERPL-SCNC: 143 MMOL/L (ref 134–144)
TRIGL SERPL-MCNC: 70 MG/DL (ref 0–149)
TSH SERPL DL<=0.005 MIU/L-ACNC: 2.47 UIU/ML (ref 0.45–4.5)
VLDLC SERPL CALC-MCNC: 14 MG/DL (ref 5–40)
WBC # BLD AUTO: 6.6 X10E3/UL (ref 3.4–10.8)

## 2018-11-20 RX ORDER — LEVOFLOXACIN 500 MG/1
500 TABLET, FILM COATED ORAL DAILY
Qty: 14 TAB | Refills: 0 | Status: SHIPPED | OUTPATIENT
Start: 2018-11-20 | End: 2019-01-14

## 2018-11-20 NOTE — TELEPHONE ENCOUNTER
Called patient with lab results. PSA was elevated over baseline . Has had this happen in the past and repeat was normal. Will at this point treat with 2 weeks of antibiotics and repeat PSA in 1 month. If still elevated, will have him see urology. Labs slip prepared and he knows to return for lab in 1 month.

## 2018-12-07 ENCOUNTER — HOSPITAL ENCOUNTER (OUTPATIENT)
Dept: LAB | Age: 68
Discharge: HOME | End: 2018-12-07
Payer: MEDICARE

## 2018-12-07 PROCEDURE — 84154 ASSAY OF PSA FREE: CPT

## 2018-12-07 PROCEDURE — 36415 COLL VENOUS BLD VENIPUNCTURE: CPT

## 2018-12-08 LAB
PSA SERPL-MCNC: 3.1 NG/ML (ref 0–4)
REFLEX CRITERIA: NORMAL

## 2018-12-19 ENCOUNTER — TELEPHONE (OUTPATIENT)
Dept: INTERNAL MEDICINE CLINIC | Age: 68
End: 2018-12-19

## 2018-12-19 NOTE — TELEPHONE ENCOUNTER
----- Message from Rei Leiva MD sent at 12/10/2018  8:51 PM EST -----  Needs to see urology. PSA is elevated.

## 2018-12-19 NOTE — TELEPHONE ENCOUNTER
Spoke with pt in ref to PSA results. Has increased from 2.8 on 11/19 to now 3.1. Per [facility], to f/u with urology. He has been seen by uro, but no notes. Number given for [facility] as he said that is where he went before. Verbalized understanding.

## 2019-01-14 ENCOUNTER — OFFICE VISIT (OUTPATIENT)
Dept: INTERNAL MEDICINE CLINIC | Age: 69
End: 2019-01-14

## 2019-01-14 VITALS
RESPIRATION RATE: 20 BRPM | SYSTOLIC BLOOD PRESSURE: 132 MMHG | HEIGHT: 67 IN | HEART RATE: 57 BPM | WEIGHT: 197.8 LBS | DIASTOLIC BLOOD PRESSURE: 82 MMHG | BODY MASS INDEX: 31.04 KG/M2 | OXYGEN SATURATION: 96 % | TEMPERATURE: 98.2 F

## 2019-01-14 DIAGNOSIS — R42 VERTIGO: Primary | ICD-10-CM

## 2019-01-14 DIAGNOSIS — I10 ESSENTIAL HYPERTENSION: ICD-10-CM

## 2019-01-14 DIAGNOSIS — L30.9 DERMATITIS: ICD-10-CM

## 2019-01-14 RX ORDER — MULTIVIT WITH MINERALS/HERBS
2 TABLET ORAL DAILY
COMMUNITY

## 2019-01-14 NOTE — PATIENT INSTRUCTIONS
Benign Paroxysmal Positional Vertigo (BPPV): Care Instructions  Your Care Instructions    Benign paroxysmal positional vertigo, also called BPPV, is an inner ear problem. It causes a spinning or whirling sensation when you move your head. This sensation is called vertigo. The vertigo usually lasts for less than a minute. People often have vertigo spells for a few days or weeks. Then the vertigo goes away. But it may come back again. The vertigo may be mild, or it may be bad enough to cause unsteadiness, nausea, and vomiting. When you move, your inner ear sends messages to the brain. This helps you keep your balance. Vertigo can happen when debris builds up in the inner ear. The buildup can cause the inner ear to send the wrong message to the brain. Your doctor may move you in different positions to help your vertigo get better faster. This is called the Epley maneuver. Your doctor may also prescribe medicines or exercises to help with your symptoms. Follow-up care is a key part of your treatment and safety. Be sure to make and go to all appointments, and call your doctor if you are having problems. It's also a good idea to know your test results and keep a list of the medicines you take. How can you care for yourself at home? · If your doctor suggests that you do Malik-Daroff exercises:  ? Sit on the edge of a bed or sofa. Quickly lie down on the side that causes the worst vertigo. Lie on your side with your ear down. ? Stay in this position for at least 30 seconds or until the vertigo goes away. ? Sit up. If this causes vertigo, wait for it to stop. ? Repeat the procedure on the other side. ? Repeat this 10 times. Do these exercises 2 times a day until the vertigo is gone. When should you call for help? Call 911 anytime you think you may need emergency care. For example, call if:    · You have symptoms of a stroke.  These may include:  ? Sudden numbness, tingling, weakness, or loss of movement in your face, arm, or leg, especially on only one side of your body. ? Sudden vision changes. ? Sudden trouble speaking. ? Sudden confusion or trouble understanding simple statements. ? Sudden problems with walking or balance. ? A sudden, severe headache that is different from past headaches.    Call your doctor now or seek immediate medical care if:    · You have new or worse nausea and vomiting.     · You have new symptoms such as hearing loss or roaring in your ears.    Watch closely for changes in your health, and be sure to contact your doctor if:    · You are not getting better as expected.     · Your vertigo gets worse. Where can you learn more? Go to http://kayceeSansancedric.info/. Enter  in the search box to learn more about \"Benign Paroxysmal Positional Vertigo (BPPV): Care Instructions. \"  Current as of: March 28, 2018  Content Version: 11.8  © 1545-5508 [employer]. Care instructions adapted under license by [employer] (which disclaims liability or warranty for this information). If you have questions about a medical condition or this instruction, always ask your healthcare professional. [place] [address] any warranty or liability for your use of this information. Epley Maneuver for Vertigo: Exercises  Your Care Instructions  The Epley Maneuver is a series of movements your doctor may use to treat your vertigo. Here are the steps for the exercises. Your doctor or physical therapist will guide you through the movements. A single 10- to 15-minute session often is all that's needed. Crystal debris (canaliths) cause the vertigo. When your head is moved into different positions, the debris moves freely. This may cause your symptoms to stop. How to do the exercises  Step 1    1. You will sit on the doctor's exam table. Your legs will be out in front of you.  The doctor or physical therapist will turn your head so that it is custodial between looking straight ahead and looking to the side that causes the worst vertigo. 2. Without changing your head position, he or she will guide you back quickly. Your shoulders will be on the table. Your head will hang over the edge of the table. At this point, the side of your head that is causing the worst vertigo will face the floor. You'll stay in this position for 30 seconds or until your symptoms stop. Step 2    1. Then, the doctor or physical therapist will turn your head to the other side. You don't need to lift your head. The other side of your head will face the floor. You will stay in this position for 30 seconds or until your symptoms stop. Step 3    1. The doctor or physical therapist will help you roll your body in the same direction that your head is facing. You will lie on your side. (For example, if you are looking to your right, you will roll onto your right side.) The side that causes the worst symptoms should be facing up. You'll stay in this position for another 30 seconds or until your symptoms stop. Step 4    1. The doctor or physical therapist will then help you to sit back up. Your legs will hang off the table on the same side that you were facing. Follow-up care is a key part of your treatment and safety. Be sure to make and go to all appointments, and call your doctor if you are having problems. It's also a good idea to know your test results and keep a list of the medicines you take. Where can you learn more? Go to http://kaycee-cedric.info/. Enter L587 in the search box to learn more about \"Epley Maneuver for Vertigo: Exercises. \"  Current as of: March 28, 2018  Content Version: 11.8  © 6375-6347 [employer]. Care instructions adapted under license by [employer] (which disclaims liability or warranty for this information).  If you have questions about a medical condition or this instruction, always ask your healthcare professional. [address] any warranty or liability for your use of this information.

## 2019-01-14 NOTE — PROGRESS NOTES
Chief Complaint   Patient presents with    Dizziness    Rash     Pt states pt got out of bed with dizziness and feeling off balanced. Pt states he doesn't hear well out of left ear. Pt started new blood pressure medication, a week ago started taking half the dose. Pt also has rash on back that is itching. 1. Have you been to the ER, urgent care clinic since your last visit? Hospitalized since your last visit? no    2. Have you seen or consulted any other health care providers outside of the [address] [place] [address] since your last visit? Include any pap smears or colon screening.  Urologist Dr. Buster Granger 12/2018;

## 2019-01-14 NOTE — PROGRESS NOTES
Maribel Palomo is a 77 y.o. male who was seen in clinic today (1/14/2019) for an acute visit. Assessment & Plan:   Diagnoses and all orders for this visit:    1. Vertigo- this is a recurrent problem, it is new to me, he is symptomatic currently, differential dx reviewed with the patient, exact etiology is unclear at this time. If seems like BPPV but Magui-Halpike was only slightly abnormal.  Do not think this is CNS, but if no improvement will need imaging. Will treat w/ Epley and rest and time. Red flags were reviewed with the patient to RTC or notify me, expected time course for resolution reviewed. If no changes in 2-3 days or if symptoms worsen he will let me know. Reassured I don't think this is infection or medication related. 2. Dermatitis- this is a new problem, symptoms are: stable (itching), differential dx reviewed with the patient, exact etiology is unclear at this time. Do not think this is medication related. Can try moisturizer and/or OTC steroid cream.  Would have him see dermatologist, he was provided list of specialist.  He will notify me if he needs referral once he has appt. Red flags were reviewed with the patient to RTC or notify me, expected time course for resolution reviewed. 3. Essential hypertension- well controlled on 1/2 tab, BP running 120-130's and 70-80's, no changes, will defer to PCP. Follow-up Disposition:  Return if symptoms worsen or fail to improve. Subjective:   [facility] was seen today for Dizziness and Rash    Vertigo  Patient complains of vertigo (described as off balance). The symptoms started today and is unchanged. It is on/off. The attacks occur every whenever he moves around or looks to the left. He reports symptoms last until he can rest.  Associated CNS symptoms: nothing. He denies ear pain, ear pressure, tinnitus. Recent infections: none. Head trauma: denied. New medications: was Benicar in November.   Dose decreased last week due to low DBP. Dermatology Review  He is here to talk about rash. He noticed it 2 months ago, with no changes since that time. Location: back. Symptoms include itching. He reports: new medications (Benicar). Treatment to date has included none. Prior to Admission medications    Medication Sig Start Date End Date Taking? Authorizing Provider   b complex vitamins tablet Take 1 Tab by mouth daily. Yes Provider, Historical   multivitamin (ONE A DAY) tablet Take 1 Tab by mouth daily. Yes Provider, Historical   triamcinolone acetonide (KENALOG) 0.1 % topical cream Apply  to affected area two (2) times daily as needed for Skin Irritation. use thin layer 11/19/18  Yes Radha English MD   olmesartan (BENICAR) 40 mg tablet Take 1 Tab by mouth daily. Patient taking differently: Take 40 mg by mouth daily. 11/19/18  Yes Radha English MD   sildenafil citrate (VIAGRA) 100 mg tablet Take 1 Tab by mouth as needed. 5/17/18  Yes Radha English MD   aspirin delayed-release 81 mg tablet Take 81 mg by mouth daily. Yes Provider, Historical          No Known Allergies        Review of Systems   Constitutional: Negative for chills and fever. Respiratory: Negative for cough and shortness of breath. Cardiovascular: Negative for chest pain and palpitations. Objective:   Physical Exam   Cardiovascular: Regular rhythm and normal heart sounds. Bradycardia present. No murmur heard. Pulmonary/Chest: Effort normal and breath sounds normal. He has no wheezes. He has no rales. Neurological: He has normal strength. No cranial nerve deficit or sensory deficit.    Charlyne Kaska testing was negative bilaterally    Skin:   Diffuse back there is multiple raised erythematous macules, no open lesions, no pustules, no bullae         Visit Vitals  /82 (BP 1 Location: Left arm, BP Patient Position: Sitting)   Pulse (!) 57   Temp 98.2 °F (36.8 °C) (Oral)   Resp 20   Ht 5' 7\" (1.702 m) Wt 197 lb 12.8 oz (89.7 kg)   SpO2 96%   BMI 30.98 kg/m²         Disclaimer:  Advised him to call back or return to office if symptoms worsen/change/persist.  Discussed expected course/resolution/complications of diagnosis in detail with patient. Medication risks/benefits/costs/interactions/alternatives discussed with patient. He was given an after visit summary which includes diagnoses, current medications, & vitals. He expressed understanding with the diagnosis and plan. Aspects of this note may have been generated using voice recognition software. Despite editing, there may be some syntax errors.        Carlos Alberto Torres MD

## 2019-03-08 ENCOUNTER — OFFICE VISIT (OUTPATIENT)
Dept: INTERNAL MEDICINE CLINIC | Age: 69
End: 2019-03-08

## 2019-03-08 VITALS
WEIGHT: 193 LBS | OXYGEN SATURATION: 100 % | HEART RATE: 59 BPM | DIASTOLIC BLOOD PRESSURE: 82 MMHG | RESPIRATION RATE: 16 BRPM | SYSTOLIC BLOOD PRESSURE: 124 MMHG | TEMPERATURE: 98.1 F | BODY MASS INDEX: 30.29 KG/M2 | HEIGHT: 67 IN

## 2019-03-08 DIAGNOSIS — I10 ESSENTIAL HYPERTENSION: Primary | ICD-10-CM

## 2019-03-08 DIAGNOSIS — L50.9 URTICARIA: ICD-10-CM

## 2019-03-08 RX ORDER — AMLODIPINE BESYLATE 5 MG/1
5 TABLET ORAL DAILY
Qty: 30 TAB | Refills: 0 | Status: SHIPPED | OUTPATIENT
Start: 2019-03-08 | End: 2019-05-10 | Stop reason: SDUPTHER

## 2019-03-08 RX ORDER — CETIRIZINE HCL 10 MG
10 TABLET ORAL
Qty: 30 TAB | Refills: 5 | Status: SHIPPED | OUTPATIENT
Start: 2019-03-08 | End: 2020-03-13

## 2019-03-08 NOTE — PROGRESS NOTES
HPI:  Josefina Wiley is a 77y.o. year old male who is here for a routine visit:    Presents for follow-up of his blood pressure and to discuss possible side effects. He notes that his blood pressures been under great control. He is cough with ACE inhibitors has resolved with the Benicar. He has noted a years history or so of increased itching. It seems to be across the chest and upper back mostly. He does note some dry scaly skin. He has been using topical steroids and moisturizers with limited success. His wife is concerned that it may be related to his blood pressure medicine. Past Medical History:   Diagnosis Date    Hypertension     OA (osteoarthritis) 8/19/2011       Past Surgical History:   Procedure Laterality Date    HX CATARACT REMOVAL Left 1993    left     HX CATARACT REMOVAL Right 2018    HX HIP REPLACEMENT Left 01/1999    HX HIP REPLACEMENT Right     Right x2    [place] ANESTH,KNEE AREA SURGERY  1966       Prior to Admission medications    Medication Sig Start Date End Date Taking? Authorizing Provider   varicella-zoster recombinant, PF, (SHINGRIX, PF,) 50 mcg/0.5 mL susr injection 0.5 mL by IntraMUSCular route once for 1 dose. 3/8/19 3/8/19 Yes Yulissa Mansfield MD   amLODIPine (NORVASC) 5 mg tablet Take 1 Tab by mouth daily. 3/8/19  Yes Yulissa Mansfield MD   cetirizine (ZYRTEC) 10 mg tablet Take 1 Tab by mouth nightly. 3/8/19  Yes Yulissa Mansfield MD   b complex vitamins tablet Take 1 Tab by mouth daily. Yes Provider, Historical   multivitamin (ONE A DAY) tablet Take 1 Tab by mouth daily. Yes Provider, Historical   aspirin delayed-release 81 mg tablet Take 81 mg by mouth daily. Yes Provider, Historical   triamcinolone acetonide (KENALOG) 0.1 % topical cream Apply  to affected area two (2) times daily as needed for Skin Irritation. use thin layer 11/19/18   Denzel Baker III, MD   sildenafil citrate (VIAGRA) 100 mg tablet Take 1 Tab by mouth as needed.  5/17/18 Zamzam Alvarado MD       Social History     Socioeconomic History    Marital status:      Spouse name: Not on file    Number of children: Not on file    Years of education: Not on file    Highest education level: Not on file   Social Needs    Financial resource strain: Not on file    Food insecurity - worry: Not on file    Food insecurity - inability: Not on file    Transportation needs - medical: Not on file   [employer] needs - non-medical: Not on file   Occupational History     Employer: [employer]   Tobacco Use    Smoking status: Never Smoker    Smokeless tobacco: Never Used   Substance and Sexual Activity    Alcohol use: Yes     Alcohol/week: 1.0 oz     Types: 2 Standard drinks or equivalent per week     Frequency: Monthly or less     Drinks per session: 1 or 2     Binge frequency: Never     Comment: socially     Drug use: No    Sexual activity: Yes     Partners: Female     Birth control/protection: None   Other Topics Concern    Not on file   Social History Narrative    Not on file          ROS  Per HPI  Visit Vitals  /82   Pulse (!) 59   Temp 98.1 °F (36.7 °C) (Oral)   Resp 16   Ht 5' 7\" (1.702 m)   Wt 193 lb (87.5 kg)   SpO2 100%   BMI 30.23 kg/m²         Physical Exam   Physical Examination: General appearance - alert, well appearing, and in no distress  Mouth - mucous membranes moist, pharynx normal without lesions  Neck - supple, no significant adenopathy  Lymphatics - no palpable lymphadenopathy, no hepatosplenomegaly  Chest - clear to auscultation, no wheezes, rales or rhonchi, symmetric air entry  Heart - normal rate, regular rhythm, normal S1, S2, no murmurs, rubs, clicks or gallops  Abdomen - soft, nontender, nondistended, no masses or organomegaly  Extremities - peripheral pulses normal, no pedal edema, no clubbing or cyanosis  Skin -some dry scaly skin. Some excoriated papules across the upper chest and back.       Assessment/Plan:  Diagnoses and all orders for this visit:    1. Essential hypertension-blood pressure well controlled. Question raulito side effects from Benicar. At this point will stop it and switch to calcium channel blocker and see if that is better tolerated. 2. Urticaria-add antihistamines. If not improved with the blood pressure change and this, he will see dermatology as planned. Other orders  -     varicella-zoster recombinant, PF, (SHINGRIX, PF,) 50 mcg/0.5 mL susr injection; 0.5 mL by IntraMUSCular route once for 1 dose. -     amLODIPine (NORVASC) 5 mg tablet; Take 1 Tab by mouth daily. -     cetirizine (ZYRTEC) 10 mg tablet; Take 1 Tab by mouth nightly. Follow-up Disposition:  Return in about 6 weeks (around 4/19/2019). Advised him to call back or return to office if symptoms worsen/change/persist.  Discussed expected course/resolution/complications of diagnosis in detail with patient. Medication risks/benefits/costs/interactions/alternatives discussed with patient. He was given an after visit summary which includes diagnoses, current medications, & vitals. He expressed understanding with the diagnosis and plan.

## 2019-03-08 NOTE — ACP (ADVANCE CARE PLANNING)
Advance Medical Directives discussed with patient.     -Patient states that they DO NOT  have an advance directive in place at this time. Information has been provided to patient on obtaining neccessary documents.

## 2019-04-19 RX ORDER — OLMESARTAN MEDOXOMIL 20 MG/1
20 TABLET ORAL DAILY
Qty: 30 TAB | Refills: 2 | Status: SHIPPED | OUTPATIENT
Start: 2019-04-19 | End: 2019-05-10

## 2019-05-10 ENCOUNTER — OFFICE VISIT (OUTPATIENT)
Dept: CARDIOLOGY CLINIC | Age: 69
End: 2019-05-10

## 2019-05-10 VITALS
HEART RATE: 60 BPM | HEIGHT: 67 IN | DIASTOLIC BLOOD PRESSURE: 80 MMHG | WEIGHT: 187.6 LBS | OXYGEN SATURATION: 98 % | RESPIRATION RATE: 16 BRPM | BODY MASS INDEX: 29.44 KG/M2 | SYSTOLIC BLOOD PRESSURE: 140 MMHG

## 2019-05-10 DIAGNOSIS — I10 ESSENTIAL HYPERTENSION: Primary | ICD-10-CM

## 2019-05-10 RX ORDER — AMLODIPINE BESYLATE 5 MG/1
5 TABLET ORAL DAILY
Qty: 30 TAB | Refills: 0 | Status: SHIPPED | OUTPATIENT
Start: 2019-05-10 | End: 2019-05-10 | Stop reason: CLARIF

## 2019-05-10 RX ORDER — AMLODIPINE BESYLATE 5 MG/1
5 TABLET ORAL DAILY
Qty: 90 TAB | Refills: 1 | Status: SHIPPED | OUTPATIENT
Start: 2019-05-10 | End: 2019-11-06 | Stop reason: DRUGHIGH

## 2019-05-10 NOTE — PROGRESS NOTES
HISTORY OF PRESENT ILLNESS  Dora Arvizu is a 68 y.o. male. Patient with h/o HTN seen here for evaluation of HTN and bradycardia  ECHO on 4/17:Left ventricle: Systolic function was normal. Ejection fraction was  estimated in the range of 55 % to 60 %. There were no regional wall motion  abnormalities. Mitral valve: There was mild regurgitation. Aortic valve: Normal valve structure. The valve was trileaflet. Leaflets  exhibited mildly increased thickness, normal cuspal separation, and  sclerosis. Tricuspid valve: There was mild regurgitation.   HOLTER on 5/9/17: sinus bradycardia  with average of 58 frequent pacs and rare pvcs, 8 episodes of possible PAT with the longest of 31 beats at 165          Past Medical History:   Diagnosis Date    Hypertension       OA (osteoarthritis) 8/19/2011                 Past Surgical History:   Procedure Laterality Date    HX CATARACT REMOVAL    1993      left     HX HIP REPLACEMENT    1/1999      Left    HX HIP REPLACEMENT    6/2000 and 10/2004      Right times 2    [place] ANESTH,KNEE AREA SURGERY    1966       Social History                 Social History    Marital status:          Spouse name: N/A    Number of children: N/A    Years of education: N/A             Occupational History    Not on file.                     Social History Main Topics    Smoking status: Never Smoker    Smokeless tobacco: Never Used    Alcohol use 1.0 oz/week           2 Standard drinks or equivalent per week              Comment: socially     Drug use: No    Sexual activity: Yes         Partners: Female               Other Topics Concern    Not on file       Social History Narrative                 Family History   Problem Relation Age of Onset    Lung Disease Mother       Heart Disease Father       Hypertension Sister       Hypertension Brother       Hypertension Son       Hypertension Daughter           HPI  Doing very well   very active with no issues at all   no cp or sob or palpitations or dizziness or syncope  Review of Systems   Constitutional: Negative. Respiratory: Negative. Cardiovascular: Negative. Physical Exam  Physical Exam   Blood pressure 140/80, pulse 60, resp. rate 16, height 5' 7\" (1.702 m), weight 187 lb 9.6 oz (85.1 kg), SpO2 98 %. Constitutional: He is oriented to person, place, and time. He appears well-developed and well-nourished. No distress. HENT: Head: Normocephalic. Eyes: No scleral icterus. Neck: Normal range of motion. Neck supple. No JVD present. No tracheal deviation present. Cardiovascular: Normal rate, regular rhythm, normal heart sounds and intact distal pulses. Exam reveals no gallop and no friction rub. No murmur heard. Pulmonary/Chest: Effort normal and breath sounds normal. No stridor. No respiratory distress, wheezes or  rales. Abdominal: He exhibits no distension. Musculoskeletal: He exhibits no edema. Neurological: He is alert and oriented to person, place, and time. Coordination normal.   Skin: Skin is warm. No rash noted. Not diaphoretic. No erythema. Psychiatric:  Normal mood and affect. Behavior is normal.   Current Outpatient Medications on File Prior to Visit   Medication Sig Dispense Refill    amLODIPine (NORVASC) 5 mg tablet Take 1 Tab by mouth daily. 30 Tab 0    cetirizine (ZYRTEC) 10 mg tablet Take 1 Tab by mouth nightly. 30 Tab 5    b complex vitamins tablet Take 1 Tab by mouth daily.  triamcinolone acetonide (KENALOG) 0.1 % topical cream Apply  to affected area two (2) times daily as needed for Skin Irritation. use thin layer 80 g 1    aspirin delayed-release 81 mg tablet Take 81 mg by mouth daily.  olmesartan (BENICAR) 20 mg tablet Take 1 Tab by mouth daily. 30 Tab 2    multivitamin (ONE A DAY) tablet Take 1 Tab by mouth daily.  sildenafil citrate (VIAGRA) 100 mg tablet Take 1 Tab by mouth as needed.  2 Tab 0     No current facility-administered medications on file prior to visit. Lab Results   Component Value Date/Time    Cholesterol, total 173 11/19/2018 09:03 AM    HDL Cholesterol 63 11/19/2018 09:03 AM    LDL, calculated 96 11/19/2018 09:03 AM    VLDL, calculated 14 11/19/2018 09:03 AM    Triglyceride 70 11/19/2018 09:03 AM    CHOL/HDL Ratio 2.9 07/21/2010 11:05 AM       ASSESSMENT and PLAN  HTN: His blood pressure seems controlled on norvasc  (stopped benicar for recall)  He has changed some of his diet to reduce the intake of salt. He continues to exercise routinely. No additional changes at this time.     Bradycardia: his electrocardiogram today reveals a normal sinus rhythm with 1 isolated premature atrial contraction. No significant ST-T wave abnormalities are noted. He likely has minimal conduction disease but at this point is really no need for further intervention.    Stress test on the back burner for now in this asymptomatic patient, but we have discussed potentially proceeding with  calcium score   Its significance and implications discussed     Continue asa for now    His cholesterol is closely followed by his pcp     Otherwise in 1 year unless of occurring issues

## 2019-05-10 NOTE — TELEPHONE ENCOUNTER
Patient walked in this morning -- would like to have a hard script since he had trouble getting this med last time -- it was on backorder and he had to end up going to another pharmacy. Patient is waiting in lobby.

## 2019-05-10 NOTE — PROGRESS NOTES
Visit Vitals  /80 (BP 1 Location: Left arm, BP Patient Position: Sitting)   Pulse 60   Resp 16   Ht 5' 7\" (1.702 m)   Wt 187 lb 9.6 oz (85.1 kg)   SpO2 98%   BMI 29.38 kg/m²

## 2019-05-10 NOTE — TELEPHONE ENCOUNTER
Orders Placed This Encounter    DISCONTD: amLODIPine (NORVASC) 5 mg tablet     Sig: Take 1 Tab by mouth daily. Dispense:  30 Tab     Refill:  0    amLODIPine (NORVASC) 5 mg tablet     Sig: Take 1 Tab by mouth daily. Dispense:  90 Tab     Refill:  1     The above orders were approved via [facility] per Dr. Eliazar Rodriguez, III.

## 2019-05-10 NOTE — TELEPHONE ENCOUNTER
Orders Placed This Encounter    amLODIPine (NORVASC) 5 mg tablet     Sig: Take 1 Tab by mouth daily. Dispense:  30 Tab     Refill:  0     The above orders were approved via [facility] per Dr. Eliazar Rodriguez III.

## 2019-06-10 ENCOUNTER — HOSPITAL ENCOUNTER (OUTPATIENT)
Dept: CT IMAGING | Age: 69
Discharge: HOME | End: 2019-06-10
Payer: SELF-PAY

## 2019-06-10 DIAGNOSIS — Z29.9 PREVENTIVE MEASURE: ICD-10-CM

## 2019-06-10 PROCEDURE — 75571 CT HRT W/O DYE W/CA TEST: CPT

## 2019-06-11 NOTE — CARDIO/PULMONARY
Reached patient at his given mobile number and shared his coronary artery calcium score of 203 with him. We discussed the meaning of this score. Patient plans to follow up with his cardiologist, Dr. Angela Kim. Patient has no further questions at this time.

## 2019-06-17 ENCOUNTER — TELEPHONE (OUTPATIENT)
Dept: CARDIOLOGY CLINIC | Age: 69
End: 2019-06-17

## 2019-06-19 NOTE — TELEPHONE ENCOUNTER
Verified patient with two patient identifiers. Spoke with patient regarding calcium score result along with cholesterol and undergo stress test.Patient requested an appointment to see Yousuf Bocanegra to discuss further results. Appointment given on July 1,2019.

## 2019-07-01 ENCOUNTER — OFFICE VISIT (OUTPATIENT)
Dept: CARDIOLOGY CLINIC | Age: 69
End: 2019-07-01

## 2019-07-01 VITALS
HEART RATE: 64 BPM | DIASTOLIC BLOOD PRESSURE: 80 MMHG | RESPIRATION RATE: 16 BRPM | OXYGEN SATURATION: 98 % | HEIGHT: 67 IN | BODY MASS INDEX: 29.13 KG/M2 | WEIGHT: 185.6 LBS | SYSTOLIC BLOOD PRESSURE: 144 MMHG

## 2019-07-01 DIAGNOSIS — I10 ESSENTIAL HYPERTENSION: ICD-10-CM

## 2019-07-01 DIAGNOSIS — R93.1 ELEVATED CORONARY ARTERY CALCIUM SCORE: Primary | ICD-10-CM

## 2019-07-01 DIAGNOSIS — I25.10 CORONARY ARTERY DISEASE INVOLVING NATIVE CORONARY ARTERY OF NATIVE HEART WITHOUT ANGINA PECTORIS: ICD-10-CM

## 2019-07-01 NOTE — PROGRESS NOTES
HISTORY OF PRESENT ILLNESS  Becky Potts is a 68 y.o. male. Patient with h/o HTN seen here for evaluation of HTN and bradycardia  ECHO on 4/17:Left ventricle: Systolic function was normal. Ejection fraction was  estimated in the range of 55 % to 60 %. There were no regional wall motion  abnormalities. Mitral valve: There was mild regurgitation. Aortic valve: Normal valve structure. The valve was trileaflet. Leaflets  exhibited mildly increased thickness, normal cuspal separation, and  sclerosis. Tricuspid valve: There was mild regurgitation.   HOLTER on 5/9/17: sinus bradycardia  with average of 58 frequent pacs and rare pvcs, 8 episodes of possible PAT with the longest of 31 beats at 165    Ca score on 6/19:Left main coronary artery: 0   Left anterior descending coronary artery: 170  Left circumflex coronary artery: 0  Right coronary artery: 33     Total calcium score: 203           Past Medical History:   Diagnosis Date    Hypertension       OA (osteoarthritis) 8/19/2011                 Past Surgical History:   Procedure Laterality Date    HX CATARACT REMOVAL    1993      left     HX HIP REPLACEMENT    1/1999      Left    HX HIP REPLACEMENT    6/2000 and 10/2004      Right times 2    [place] ANESTH,KNEE AREA SURGERY    1966       Social History                 Social History    Marital status:          Spouse name: N/A    Number of children: N/A    Years of education: N/A             Occupational History    Not on file.                     Social History Main Topics    Smoking status: Never Smoker    Smokeless tobacco: Never Used    Alcohol use 1.0 oz/week           2 Standard drinks or equivalent per week              Comment: socially     Drug use: No    Sexual activity: Yes         Partners: Female               Other Topics Concern    Not on file       Social History Narrative                 Family History   Problem Relation Age of Onset    Lung Disease Mother       Heart Disease Father       Hypertension Sister [place]    Hypertension Brother       Hypertension Son       Hypertension Daughter          HPI  No cp or sob reported doing very well  Review of Systems   Constitutional: Negative. Respiratory: Negative. Cardiovascular: Negative. Physical Exam   Neck: No JVD present. Carotid bruit is not present. Musculoskeletal: He exhibits no edema. Visit Vitals  /80 (BP 1 Location: Left arm, BP Patient Position: Sitting)   Pulse 64   Resp 16   Ht 5' 7\" (1.702 m)   Wt 185 lb 9.6 oz (84.2 kg)   SpO2 98%   BMI 29.07 kg/m²         ASSESSMENT and PLAN  HTN: His blood pressure seems to be borderline   discussed options   consider ncreasing norvasc and or adding arb back ( bp usually higher in am)  Check bp during stress test before changing meds    He has changed some of his diet to reduce the intake of salt.  He continues to exercise routinely.       Bradycardia: his electrocardiogram  revealed a normal sinus rhythm with 1 isolated premature atrial contraction.  No significant ST-T wave abnormalities are noted.  He likely has minimal conduction disease but at this point is really no need for further intervention.      CA score : discussed at length significance and implication of ca score results  Proceed with stress echo and aggressive reduction in risk factors   ldl level now<70     Continue asa for now     His cholesterol is closely followed by his pcp ldl level needed now <70  Consider statin if ldl still >70 at the next OV     Otherwise in 6 months

## 2019-07-01 NOTE — PROGRESS NOTES
Visit Vitals  /80 (BP 1 Location: Left arm, BP Patient Position: Sitting)   Pulse 64   Resp 16   Ht 5' 7\" (1.702 m)   Wt 185 lb 9.6 oz (84.2 kg)   SpO2 98%   BMI 29.07 kg/m²

## 2019-11-06 ENCOUNTER — HOSPITAL ENCOUNTER (OUTPATIENT)
Dept: LAB | Age: 69
Discharge: HOME | End: 2019-11-06
Payer: MEDICARE

## 2019-11-06 ENCOUNTER — OFFICE VISIT (OUTPATIENT)
Dept: INTERNAL MEDICINE CLINIC | Age: 69
End: 2019-11-06

## 2019-11-06 VITALS
BODY MASS INDEX: 27.62 KG/M2 | HEART RATE: 54 BPM | WEIGHT: 176 LBS | TEMPERATURE: 98.3 F | OXYGEN SATURATION: 100 % | SYSTOLIC BLOOD PRESSURE: 155 MMHG | DIASTOLIC BLOOD PRESSURE: 107 MMHG | HEIGHT: 67 IN | RESPIRATION RATE: 14 BRPM

## 2019-11-06 DIAGNOSIS — M17.0 PRIMARY OSTEOARTHRITIS OF BOTH KNEES: ICD-10-CM

## 2019-11-06 DIAGNOSIS — I47.1 ATRIAL TACHYCARDIA (HCC): ICD-10-CM

## 2019-11-06 DIAGNOSIS — Z12.5 PROSTATE CANCER SCREENING: ICD-10-CM

## 2019-11-06 DIAGNOSIS — I10 ESSENTIAL HYPERTENSION: ICD-10-CM

## 2019-11-06 DIAGNOSIS — Z00.00 MEDICARE ANNUAL WELLNESS VISIT, SUBSEQUENT: Primary | ICD-10-CM

## 2019-11-06 DIAGNOSIS — F43.21 SITUATIONAL DEPRESSION: ICD-10-CM

## 2019-11-06 PROCEDURE — 80053 COMPREHEN METABOLIC PANEL: CPT

## 2019-11-06 PROCEDURE — 84443 ASSAY THYROID STIM HORMONE: CPT

## 2019-11-06 PROCEDURE — 84153 ASSAY OF PSA TOTAL: CPT

## 2019-11-06 PROCEDURE — 85025 COMPLETE CBC W/AUTO DIFF WBC: CPT

## 2019-11-06 PROCEDURE — 36415 COLL VENOUS BLD VENIPUNCTURE: CPT

## 2019-11-06 PROCEDURE — 80061 LIPID PANEL: CPT

## 2019-11-06 RX ORDER — AMLODIPINE BESYLATE 10 MG/1
10 TABLET ORAL DAILY
Qty: 90 TAB | Refills: 1 | Status: SHIPPED | OUTPATIENT
Start: 2019-11-06 | End: 2020-05-04

## 2019-11-06 RX ORDER — SERTRALINE HYDROCHLORIDE 50 MG/1
TABLET, FILM COATED ORAL
Qty: 30 TAB | Refills: 1 | Status: SHIPPED | OUTPATIENT
Start: 2019-11-06 | End: 2019-12-09 | Stop reason: SDUPTHER

## 2019-11-06 NOTE — PROGRESS NOTES
This is the Subsequent Medicare Annual Wellness Exam, performed 12 months or more after the Initial AWV or the last Subsequent AWV    I have reviewed the patient's medical history in detail and updated the computerized patient record. As well as a followup of his health issues. Recently had increased issues with anxiety as well as depression. He has become concerned about issues with his family and they are troubling to him. He is sleeping reasonably well. He is not exercising and has not been interested in that. He denies any suicidality but does acknowledge depression. He has not been checking his blood pressures. He does seem to be tolerating the Norvasc well. No headaches. No dizziness. No chest pains or shortness of breath. Does have ongoing issues with joint aches. History     Patient Active Problem List   Diagnosis Code    HTN (hypertension) I10    OA (osteoarthritis) M19.90    Advance directive discussed with patient Z70.80    Atrial tachycardia ([facility] [address] I47.1     Past Medical History:   Diagnosis Date    Hypertension     OA (osteoarthritis) 8/19/2011      Past Surgical History:   Procedure Laterality Date    HX CATARACT REMOVAL Left 1993    left     HX CATARACT REMOVAL Right 2018    HX HIP REPLACEMENT Left 01/1999    HX HIP REPLACEMENT Right     Right x2    [place] ANESTH,KNEE AREA SURGERY  1966     Current Outpatient Medications   Medication Sig Dispense Refill    varicella-zoster recombinant, PF, (SHINGRIX, PF,) 50 mcg/0.5 mL susr injection 0.5 mL by IntraMUSCular route once for 1 dose. 0.5 mL 1    multivit-mins no.63/iron/folic (M-VIT PO) Take  by mouth.  amLODIPine (NORVASC) 10 mg tablet Take 1 Tab by mouth daily. 90 Tab 1    sertraline (ZOLOFT) 50 mg tablet 1/2 for 6 days then one whole tab 30 Tab 1    cetirizine (ZYRTEC) 10 mg tablet Take 1 Tab by mouth nightly.  (Patient taking differently: Take 10 mg by mouth daily as needed.) 30 Tab 5    b complex vitamins tablet Take 1 Tab by mouth daily.  triamcinolone acetonide (KENALOG) 0.1 % topical cream Apply  to affected area two (2) times daily as needed for Skin Irritation. use thin layer 80 g 1    aspirin delayed-release 81 mg tablet Take 81 mg by mouth daily. No Known Allergies    Family History   Problem Relation Age of Onset    Lung Disease Mother     Heart Disease Father     Hypertension Sister     Hypertension Brother     Hypertension Son     Hypertension Daughter      Social History     Tobacco Use    Smoking status: Never Smoker    Smokeless tobacco: Never Used   Substance Use Topics    Alcohol use:  Yes     Alcohol/week: 1.7 standard drinks     Types: 2 Standard drinks or equivalent per week     Frequency: Monthly or less     Drinks per session: 1 or 2     Binge frequency: Never     Comment: socially    ROS _ Per HPI  Physical Examination: General appearance - alert, well appearing, and in no distress  Eyes - pupils equal and reactive, extraocular eye movements intact  Ears - bilateral TM's and external ear canals normal  Nose - normal and patent, no erythema, discharge or polyps  Mouth - mucous membranes moist, pharynx normal without lesions  Neck - supple, no significant adenopathy  Lymphatics - no palpable lymphadenopathy, no hepatosplenomegaly  Chest - clear to auscultation, no wheezes, rales or rhonchi, symmetric air entry  Heart - normal rate, regular rhythm, normal S1, S2, no murmurs, rubs, clicks or gallops  Abdomen - soft, nontender, nondistended, no masses or organomegaly  Neurological - alert, oriented, normal speech, no focal findings or movement disorder noted  Musculoskeletal - no joint tenderness, deformity or swelling  Extremities - peripheral pulses normal, no pedal edema, no clubbing or cyanosis      Depression Risk Factor Screening:     3 most recent PHQ Screens 11/6/2019   Little interest or pleasure in doing things Not at all   Feeling down, depressed, irritable, or hopeless Not at all   Total Score PHQ 2 0       Alcohol Risk Factor Screening (MALE > 65): Do you average more 1 drink per night or more than 7 drinks a week: No    In the past three months have you have had more than 4 drinks containing alcohol on one occasion: No      Functional Ability and Level of Safety:   Hearing: Hearing is good. Activities of Daily Living: The home contains: no safety equipment. Patient does total self care    Ambulation: with no difficulty    Fall Risk:  Fall Risk Assessment, last 12 mths 11/6/2019   Able to walk? Yes   Fall in past 12 months? No   Fall with injury? -   Number of falls in past 12 months -   Fall Risk Score -       Abuse Screen:  Patient is not abused    Cognitive Screening   Has your family/caregiver stated any concerns about your memory: no      Patient Care Team   Patient Care Team:  Alonzo Ruiz MD as PCP - General  Alonzo Ruiz MD [facility] PCP [facility]  Jay Monahan MD (Cardiology)    Assessment/Plan   Education and counseling provided:  Are appropriate based on today's review and evaluation  End-of-Life planning (with patient's consent)  Prostate cancer screening tests (PSA, covered annually)  Diabetes screening test    Diagnoses and all orders for this visit:    1. Essential hypertension -blood pressure not well controlled. Will increase amlodipine to 10 mg a day and he will follow blood pressures. -     CBC WITH AUTOMATED DIFF  -     METABOLIC PANEL, COMPREHENSIVE  -     LIPID PANEL  -     TSH RFX ON ABNORMAL TO FREE T4    2. Atrial tachycardia ([address] -no recurrence. 3. Primary osteoarthritis of both knees -continue Tylenol as needed. 4. Prostate cancer screening  -     PSA SCREENING (SCREENING)    5. Medicare annual wellness visit, subsequent    6. Situational depressionwill add sertraline 50 mg a day starting with 1/2 tablet. He will see me again in a month and let me know if not tolerating.   Other orders  -     varicella-zoster recombinant, PF, (SHINGRIX, PF,) 50 mcg/0.5 mL susr injection; 0.5 mL by IntraMUSCular route once for 1 dose. -     amLODIPine (NORVASC) 10 mg tablet; Take 1 Tab by mouth daily.   -     sertraline (ZOLOFT) 50 mg tablet; 1/2 for 6 days then one whole tab        Health Maintenance Due   Topic Date Due    Shingrix Vaccine Age 50> (1 of 2) 03/23/1998    MEDICARE YEARLY EXAM  11/20/2019

## 2019-11-06 NOTE — PATIENT INSTRUCTIONS
Medicare Wellness Visit, Male The best way to live healthy is to have a lifestyle where you eat a well-balanced diet, exercise regularly, limit alcohol use, and quit all forms of tobacco/nicotine, if applicable. Regular preventive services are another way to keep healthy. Preventive services (vaccines, screening tests, monitoring & exams) can help personalize your care plan, which helps you manage your own care. Screening tests can find health problems at the earliest stages, when they are easiest to treat. [facility] follows the current, evidence-based guidelines published by the [facility]fred[facility] ([facility]) when recommending preventive services for our patients. Because we follow these guidelines, sometimes recommendations change over time as research supports it. (For example, a prostate screening blood test is no longer routinely recommended for men with no symptoms). Of course, you and your doctor may decide to screen more often for some diseases, based on your risk and co-morbidities (chronic disease you are already diagnosed with). Preventive services for you include: - Medicare offers their members a free annual wellness visit, which is time for you and your primary care provider to discuss and plan for your preventive service needs. Take advantage of this benefit every year! 
-All adults over age 72 should receive the recommended pneumonia vaccines. Current USPSTF guidelines recommend a series of two vaccines for the best pneumonia protection.  
-All adults should have a flu vaccine yearly and tetanus vaccine every 10 years. 
-All adults age 48 and older should receive the shingles vaccines (series of two vaccines).       
-All adults age 38-68 who are overweight should have a diabetes screening test once every three years.  
-Other screening tests & preventive services for persons with diabetes include: an eye exam to screen for diabetic retinopathy, a kidney function test, a foot exam, and stricter control over your cholesterol.  
-Cardiovascular screening for adults with routine risk involves an electrocardiogram (ECG) at intervals determined by the provider.  
-Colorectal cancer screening should be done for adults age 54-65 with no increased risk factors for colorectal cancer. There are a number of acceptable methods of screening for this type of cancer. Each test has its own benefits and drawbacks. Discuss with your provider what is most appropriate for you during your annual wellness visit. The different tests include: colonoscopy (considered the best screening method), a fecal occult blood test, a fecal DNA test, and sigmoidoscopy. 
-All adults born between [facility] should be screened once for Hepatitis C. 
-An Abdominal Aortic Aneurysm (AAA) Screening is recommended for men age 73-68 who has ever smoked in their lifetime. Here is a list of your current Health Maintenance items (your personalized list of preventive services) with a due date: 
Health Maintenance Due Topic Date Due  Shingles Vaccine (1 of 2) 03/23/1998 [facility] Annual Well Visit  11/20/2019

## 2019-11-07 LAB
ALBUMIN SERPL-MCNC: 4.7 G/DL (ref 3.5–4.8)
ALBUMIN/GLOB SERPL: 2.1 {RATIO} (ref 1.2–2.2)
ALP SERPL-CCNC: 62 IU/L (ref 39–117)
ALT SERPL-CCNC: 18 IU/L (ref 0–44)
AST SERPL-CCNC: 23 IU/L (ref 0–40)
BASOPHILS # BLD AUTO: 0.1 X10E3/UL (ref 0–0.2)
BASOPHILS NFR BLD AUTO: 1 %
BILIRUB SERPL-MCNC: 0.6 MG/DL (ref 0–1.2)
BUN SERPL-MCNC: 12 MG/DL (ref 8–27)
BUN/CREAT SERPL: 13 (ref 10–24)
CALCIUM SERPL-MCNC: 9.8 MG/DL (ref 8.6–10.2)
CHLORIDE SERPL-SCNC: 104 MMOL/L (ref 96–106)
CHOLEST SERPL-MCNC: 168 MG/DL (ref 100–199)
CO2 SERPL-SCNC: 24 MMOL/L (ref 20–29)
CREAT SERPL-MCNC: 0.93 MG/DL (ref 0.76–1.27)
EOSINOPHIL # BLD AUTO: 0.2 X10E3/UL (ref 0–0.4)
EOSINOPHIL NFR BLD AUTO: 2 %
ERYTHROCYTE [DISTWIDTH] IN BLOOD BY AUTOMATED COUNT: 13.9 % (ref 12.3–15.4)
GLOBULIN SER CALC-MCNC: 2.2 G/DL (ref 1.5–4.5)
GLUCOSE SERPL-MCNC: 85 MG/DL (ref 65–99)
HCT VFR BLD AUTO: 45.4 % (ref 37.5–51)
HDLC SERPL-MCNC: 63 MG/DL
HGB BLD-MCNC: 15.9 G/DL (ref 13–17.7)
IMM GRANULOCYTES # BLD AUTO: 0 X10E3/UL (ref 0–0.1)
IMM GRANULOCYTES NFR BLD AUTO: 0 %
LDLC SERPL CALC-MCNC: 94 MG/DL (ref 0–99)
LYMPHOCYTES # BLD AUTO: 1.7 X10E3/UL (ref 0.7–3.1)
LYMPHOCYTES NFR BLD AUTO: 24 %
MCH RBC QN AUTO: 29.7 PG (ref 26.6–33)
MCHC RBC AUTO-ENTMCNC: 35 G/DL (ref 31.5–35.7)
MCV RBC AUTO: 85 FL (ref 79–97)
MONOCYTES # BLD AUTO: 0.6 X10E3/UL (ref 0.1–0.9)
MONOCYTES NFR BLD AUTO: 8 %
NEUTROPHILS # BLD AUTO: 4.8 X10E3/UL (ref 1.4–7)
NEUTROPHILS NFR BLD AUTO: 65 %
PLATELET # BLD AUTO: 317 X10E3/UL (ref 150–450)
POTASSIUM SERPL-SCNC: 5.4 MMOL/L (ref 3.5–5.2)
PROT SERPL-MCNC: 6.9 G/DL (ref 6–8.5)
PSA SERPL-MCNC: 1 NG/ML (ref 0–4)
RBC # BLD AUTO: 5.36 X10E6/UL (ref 4.14–5.8)
SODIUM SERPL-SCNC: 144 MMOL/L (ref 134–144)
TRIGL SERPL-MCNC: 56 MG/DL (ref 0–149)
TSH SERPL DL<=0.005 MIU/L-ACNC: 1.99 UIU/ML (ref 0.45–4.5)
VLDLC SERPL CALC-MCNC: 11 MG/DL (ref 5–40)
WBC # BLD AUTO: 7.3 X10E3/UL (ref 3.4–10.8)

## 2019-12-09 ENCOUNTER — OFFICE VISIT (OUTPATIENT)
Dept: INTERNAL MEDICINE CLINIC | Age: 69
End: 2019-12-09

## 2019-12-09 VITALS
DIASTOLIC BLOOD PRESSURE: 89 MMHG | TEMPERATURE: 98.3 F | RESPIRATION RATE: 16 BRPM | BODY MASS INDEX: 27.62 KG/M2 | SYSTOLIC BLOOD PRESSURE: 129 MMHG | OXYGEN SATURATION: 99 % | HEIGHT: 67 IN | HEART RATE: 65 BPM | WEIGHT: 176 LBS

## 2019-12-09 DIAGNOSIS — I47.1 ATRIAL TACHYCARDIA (HCC): ICD-10-CM

## 2019-12-09 DIAGNOSIS — I10 ESSENTIAL HYPERTENSION: Primary | ICD-10-CM

## 2019-12-09 PROBLEM — F41.1 GAD (GENERALIZED ANXIETY DISORDER): Status: ACTIVE | Noted: 2019-12-09

## 2019-12-09 RX ORDER — SERTRALINE HYDROCHLORIDE 50 MG/1
50 TABLET, FILM COATED ORAL DAILY
Qty: 90 TAB | Refills: 2 | Status: SHIPPED | OUTPATIENT
Start: 2019-12-09 | End: 2020-06-10 | Stop reason: ALTCHOICE

## 2019-12-09 NOTE — PROGRESS NOTES
HPI:  Olivia Dubon is a 68y.o. year old male who is here for a routine visit:    Presents for follow-up visit. His blood pressures been under much better control. Has noted very little change with using the sertraline. Denies headaches. No dizziness. No nosebleeds. No shortness of breath. No cough or wheeze. No change in bowel or bladder habits. He does find that he is stable from a mood perspective. He is not sure that the sertraline is done much but he is feeling well. Past Medical History:   Diagnosis Date    Hypertension     OA (osteoarthritis) 8/19/2011       Past Surgical History:   Procedure Laterality Date    HX CATARACT REMOVAL Left 1993    left     HX CATARACT REMOVAL Right 2018    HX HIP REPLACEMENT Left 01/1999    HX HIP REPLACEMENT Right     Right x2    MI ANESTH,KNEE AREA SURGERY  1966       Prior to Admission medications    Medication Sig Start Date End Date Taking? Authorizing Provider   sertraline (ZOLOFT) 50 mg tablet Take 1 Tab by mouth daily. 12/9/19  Yes Alonzo Ruiz MD   multivit-mins no.63/iron/folic (M-VIT PO) Take  by mouth. Yes Provider, Historical   amLODIPine (NORVASC) 10 mg tablet Take 1 Tab by mouth daily. 11/6/19  Yes Alonzo Ruiz MD   b complex vitamins tablet Take 1 Tab by mouth daily. Yes Provider, Historical   aspirin delayed-release 81 mg tablet Take 81 mg by mouth daily. Yes Provider, Historical   sertraline (ZOLOFT) 50 mg tablet 1/2 for 6 days then one whole tab 11/6/19 12/9/19  Anibal Olea III, MD   cetirizine (ZYRTEC) 10 mg tablet Take 1 Tab by mouth nightly. Patient taking differently: Take 10 mg by mouth daily as needed. 3/8/19   Anibal Olea III, MD   triamcinolone acetonide (KENALOG) 0.1 % topical cream Apply  to affected area two (2) times daily as needed for Skin Irritation.  use thin layer 11/19/18   Alonzo Ruiz MD       Social History     Socioeconomic History    Marital status:      Spouse name: Not on file    Number of children: Not on file    Years of education: Not on file    Highest education level: Not on file   Occupational History     Employer: [employer]   Social Needs    Financial resource strain: Not on file    Food insecurity:     Worry: Not on file     Inability: Not on file    Transportation needs:     Medical: Not on file     Non-medical: Not on file   Tobacco Use    Smoking status: Never Smoker    Smokeless tobacco: Never Used   Substance and Sexual Activity    Alcohol use:  Yes     Alcohol/week: 1.7 standard drinks     Types: 2 Standard drinks or equivalent per week     Frequency: Monthly or less     Drinks per session: 1 or 2     Binge frequency: Never     Comment: socially     Drug use: No    Sexual activity: Yes     Partners: Female     Birth control/protection: None   Lifestyle    Physical activity:     Days per week: Not on file     Minutes per session: Not on file    Stress: Not on file   Relationships    Social connections:     Talks on phone: Not on file     Gets together: Not on file     Attends Jainism service: Not on file     Active member of club or organization: Not on file     Attends meetings of clubs or organizations: Not on file     Relationship status: Not on file    Intimate partner violence:     Fear of current or ex partner: Not on file     Emotionally abused: Not on file     Physically abused: Not on file     Forced sexual activity: Not on file   Other Topics Concern    Not on file   Social History Narrative    Not on file          ROS  Per HPI    Visit Vitals  /89 (BP 1 Location: Left arm, BP Patient Position: Sitting)   Pulse 65   Temp 98.3 °F (36.8 °C) (Oral)   Resp 16   Ht 5' 7\" (1.702 m)   Wt 176 lb (79.8 kg)   SpO2 99%   BMI 27.57 kg/m²         Physical Exam   Physical Examination: General appearance - alert, well appearing, and in no distress  Mouth - mucous membranes moist, pharynx normal without lesions  Neck - supple, no significant adenopathy  Chest - clear to auscultation, no wheezes, rales or rhonchi, symmetric air entry  Heart - normal rate, regular rhythm, normal S1, S2, no murmurs, rubs, clicks or gallops  Abdomen - soft, nontender, nondistended, no masses or organomegaly  Neurological - alert, oriented, normal speech, no focal findings or movement disorder noted  Musculoskeletal - no joint tenderness, deformity or swelling  Extremities - peripheral pulses normal, no pedal edema, no clubbing or cyanosis      Assessment/Plan:  Diagnoses and all orders for this visit:    1. Essential hypertension -pressure well controlled. Continue current medications for that. 2. Atrial tachycardia ([address] -stable. He has an appointment coming up with cardiology. 3.  Generalized anxiety appears to be improved. Will continue sertraline for 6 months and reassess then. Other orders  -     sertraline (ZOLOFT) 50 mg tablet; Take 1 Tab by mouth daily. Follow-up and Dispositions    · Return in about 6 months (around 6/9/2020) for [address]. Advised him to call back or return to office if symptoms worsen/change/persist.  Discussed expected course/resolution/complications of diagnosis in detail with patient. Medication risks/benefits/costs/interactions/alternatives discussed with patient. He was given an after visit summary which includes diagnoses, current medications, & vitals. He expressed understanding with the diagnosis and plan.

## 2019-12-09 NOTE — PROGRESS NOTES
Chief Complaint   Patient presents with    Hypertension     Reviewed record in preparation for visit and have obtained necessary documentation. Identified pt with two pt identifiers(name and ). Health Maintenance Due   Topic    Shingrix Vaccine Age 49> (1 of 2)         Chief Complaint   Patient presents with    Hypertension        Wt Readings from Last 3 Encounters:   19 176 lb (79.8 kg)   19 176 lb (79.8 kg)   19 185 lb (83.9 kg)     Temp Readings from Last 3 Encounters:   19 98.3 °F (36.8 °C) (Oral)   19 98.3 °F (36.8 °C) (Oral)   19 98.1 °F (36.7 °C) (Oral)     BP Readings from Last 3 Encounters:   19 129/89   19 (!) 155/107   19 140/90     Pulse Readings from Last 3 Encounters:   19 65   19 (!) 54   19 64           Learning Assessment:  :     Learning Assessment 2014   PRIMARY LEARNER Patient   HIGHEST LEVEL OF EDUCATION - PRIMARY LEARNER  4 YEARS OF COLLEGE   BARRIERS PRIMARY LEARNER NONE   CO-LEARNER CAREGIVER No   PRIMARY LANGUAGE ENGLISH   LEARNER PREFERENCE PRIMARY READING   ANSWERED BY patient   RELATIONSHIP SELF       Depression Screening:  :     3 most recent PHQ Screens 2019   Little interest or pleasure in doing things Not at all   Feeling down, depressed, irritable, or hopeless Not at all   Total Score PHQ 2 0       Fall Risk Assessment:  :     Fall Risk Assessment, last 12 mths 2019   Able to walk? Yes   Fall in past 12 months? No   Fall with injury? -   Number of falls in past 12 months -   Fall Risk Score -       Abuse Screening:  :     No flowsheet data found.     Coordination of Care Questionnaire:  :     1) Have you been to an emergency room, urgent care clinic since your last visit? no   Hospitalized since your last visit? no             2) Have you seen or consulted any other health care providers outside of [address] [place] [address] since your last visit? no  (Include any pap smears or colon screenings in this section.)    3) Do you have an Advance Directive on file? no    4) Are you interested in receiving information on Advance Directives? NO      Patient is accompanied by self I have received verbal consent from Stephanie Scott to discuss any/all medical information while they are present in the room. Reviewed record  In preparation for visit and have obtained necessary documentation.

## 2020-01-06 ENCOUNTER — OFFICE VISIT (OUTPATIENT)
Dept: CARDIOLOGY CLINIC | Age: 70
End: 2020-01-06

## 2020-01-06 VITALS
WEIGHT: 177.6 LBS | HEIGHT: 67 IN | BODY MASS INDEX: 27.88 KG/M2 | HEART RATE: 51 BPM | OXYGEN SATURATION: 98 % | SYSTOLIC BLOOD PRESSURE: 110 MMHG | RESPIRATION RATE: 16 BRPM | DIASTOLIC BLOOD PRESSURE: 80 MMHG

## 2020-01-06 DIAGNOSIS — I47.1 ATRIAL TACHYCARDIA (HCC): ICD-10-CM

## 2020-01-06 DIAGNOSIS — I10 ESSENTIAL HYPERTENSION: Primary | ICD-10-CM

## 2020-01-06 NOTE — PROGRESS NOTES
HISTORY OF PRESENT ILLNESS  Priscilla Eller is a 68 y.o. male. Patient with h/o HTN seen here for evaluation of HTN and bradycardia  ECHO on 4/17:Left ventricle: Systolic function was normal. Ejection fraction was  estimated in the range of 55 % to 60 %. There were no regional wall motion  abnormalities. Mitral valve: There was mild regurgitation. Aortic valve: Normal valve structure. The valve was trileaflet. Leaflets  exhibited mildly increased thickness, normal cuspal separation, and  sclerosis. Tricuspid valve: There was mild regurgitation.   HOLTER on 5/9/17: sinus bradycardia  with average of 58 frequent pacs and rare pvcs, 8 episodes of possible PAT with the longest of 31 beats at 165     Ca score on 6/19:Left main coronary artery: 0   Left anterior descending coronary artery: 170  Left circumflex coronary artery: 0  Right coronary artery: 33     Total calcium score: 203   NORMAL STRESS ECHO on 7/19 but htn response          Past Medical History:   Diagnosis Date    Hypertension       OA (osteoarthritis) 8/19/2011                 Past Surgical History:   Procedure Laterality Date    HX CATARACT REMOVAL    1993      left     HX HIP REPLACEMENT    1/1999      Left    HX HIP REPLACEMENT    6/2000 and 10/2004      Right times 2    MD ANESTH,KNEE AREA SURGERY    1966       Social History                 Social History    Marital status:          Spouse name: N/A    Number of children: N/A    Years of education: N/A             Occupational History    Not on file.                     Social History Main Topics    Smoking status: Never Smoker    Smokeless tobacco: Never Used    Alcohol use 1.0 oz/week           2 Standard drinks or equivalent per week              Comment: socially     Drug use: No    Sexual activity: Yes         Partners: Female               Other Topics Concern    Not on file       Social History Narrative                 Family History   Problem Relation Age of Onset    Lung Disease Mother       Heart Disease Father       Hypertension Sister       Hypertension Brother       [facility]    Hypertension Daughter        HPI  Doing well no complaints  Review of Systems   Respiratory: Negative. Cardiovascular: Negative. Physical Exam   Physical Exam   Blood pressure 110/80, pulse (!) 51, resp. rate 16, height 5' 7\" (1.702 m), weight 177 lb 9.6 oz (80.6 kg), SpO2 98 %. Constitutional: He is oriented to person, place, and time. He appears well-developed and well-nourished. No distress. HENT: Head: Normocephalic. Eyes: No scleral icterus. Neck: Normal range of motion. Neck supple. No JVD present. No tracheal deviation present. Cardiovascular: Normal rate, regular rhythm, normal heart sounds and intact distal pulses. Exam reveals no gallop and no friction rub. No murmur heard. Pulmonary/Chest: Effort normal and breath sounds normal. No stridor. No respiratory distress, wheezes or  rales. Abdominal: He exhibits no distension. Musculoskeletal: He exhibits no edema. Neurological: He is alert and oriented to person, place, and time. Coordination normal.   Skin: Skin is warm. No rash noted. Not diaphoretic. No erythema. Psychiatric:  Normal mood and affect. Behavior is normal.     Visit Vitals  /80 (BP 1 Location: Left arm, BP Patient Position: Sitting)   Pulse (!) 51   Resp 16   Ht 5' 7\" (1.702 m)   Wt 177 lb 9.6 oz (80.6 kg)   SpO2 98%   BMI 27.82 kg/m²     Wt Readings from Last 3 Encounters:   01/06/20 177 lb 9.6 oz (80.6 kg)   12/09/19 176 lb (79.8 kg)   11/06/19 176 lb (79.8 kg)     Current Outpatient Medications on File Prior to Visit   Medication Sig Dispense Refill    sertraline (ZOLOFT) 50 mg tablet Take 1 Tab by mouth daily. 90 Tab 2    multivit-mins no.63/iron/folic (M-VIT PO) Take  by mouth.  amLODIPine (NORVASC) 10 mg tablet Take 1 Tab by mouth daily.  90 Tab 1    cetirizine (ZYRTEC) 10 mg tablet Take 1 Tab by mouth nightly. (Patient taking differently: Take 10 mg by mouth daily as needed.) 30 Tab 5    b complex vitamins tablet Take 1 Tab by mouth daily.  triamcinolone acetonide (KENALOG) 0.1 % topical cream Apply  to affected area two (2) times daily as needed for Skin Irritation. use thin layer 80 g 1    aspirin delayed-release 81 mg tablet Take 81 mg by mouth daily. No current facility-administered medications on file prior to visit.         ASSESSMENT and PLAN  HTN: well controlled on increased dose of norvasc     He has changed some of his diet to reduce the intake of salt. Carlita Waterman continues to exercise routinely.       Bradycardia: his electrocardiogram  revealed a normal sinus rhythm with 1 isolated premature atrial contraction.  No significant ST-T wave abnormalities are noted.  He likely has minimal conduction disease but at this point is really no need for further intervention.      CA score : discussed at length significance and implication of ca score results  Normal stress echo on 7/19  aggressive reduction in risk factors   ldl level now<70     Continue asa for now     His cholesterol is closely followed by his pcp ldl level needed now <70  Consider statin if ldl still >70 at the next OV     Otherwise see in 12 months

## 2020-03-13 RX ORDER — CETIRIZINE HCL 10 MG
TABLET ORAL
Qty: 30 TAB | Refills: 0 | Status: SHIPPED | OUTPATIENT
Start: 2020-03-13 | End: 2020-07-27

## 2020-03-19 ENCOUNTER — TELEPHONE (OUTPATIENT)
Dept: INTERNAL MEDICINE CLINIC | Age: 70
End: 2020-03-19

## 2020-03-19 NOTE — TELEPHONE ENCOUNTER
Patient c/o cough & head congestion, states hx of seasonal allergies, denies fever, chills, or body aches. Negative travel. Self-care recommendations for respiratory illness:  · Increased fluid intake, especially water to thin mucous and boost your immune system  · Avoid sugar and dairy while congested since they thicken mucous  · Get plenty of rest  · Use OTC nasal saline spray in each nostril 4 x daily; consider using a Netipot with distilled water  · Use a humidifier at bed time  · Use OTC Mucinex or Mucinex DM twice daily to loosen mucous and assist with cough  · Use OTC Tylenol (up to 650mg every 6 hours) as needed for pain, fevers, or headaches  · Avoid decongestants such as phenylephrine, pseudoephedrine if you have high blood pressure    Reviewed above care instructions w/ patient, red flags reviewed to warrant ED. Patient verbalized understanding and appreciated return call.

## 2020-05-04 RX ORDER — AMLODIPINE BESYLATE 10 MG/1
TABLET ORAL
Qty: 90 TAB | Refills: 0 | Status: SHIPPED | OUTPATIENT
Start: 2020-05-04 | End: 2020-08-05

## 2020-05-11 RX ORDER — TADALAFIL 20 MG/1
20 TABLET ORAL AS NEEDED
Qty: 20 TAB | Refills: 1 | Status: SHIPPED | OUTPATIENT
Start: 2020-05-11 | End: 2021-11-15 | Stop reason: ALTCHOICE

## 2020-06-10 ENCOUNTER — VIRTUAL VISIT (OUTPATIENT)
Dept: INTERNAL MEDICINE CLINIC | Age: 70
End: 2020-06-10

## 2020-06-10 DIAGNOSIS — N52.8 OTHER MALE ERECTILE DYSFUNCTION: ICD-10-CM

## 2020-06-10 DIAGNOSIS — I10 ESSENTIAL HYPERTENSION: Primary | ICD-10-CM

## 2020-06-10 DIAGNOSIS — F41.1 GAD (GENERALIZED ANXIETY DISORDER): ICD-10-CM

## 2020-06-10 DIAGNOSIS — M17.0 PRIMARY OSTEOARTHRITIS OF BOTH KNEES: ICD-10-CM

## 2020-06-10 DIAGNOSIS — I47.1 ATRIAL TACHYCARDIA (HCC): ICD-10-CM

## 2020-06-10 NOTE — PROGRESS NOTES
Mark Ardon is a 65 y.o. male who was seen by synchronous (real-time) audio-video technology on 6/10/2020. Consent: Mark Ardon who was seen by synchronous (real-time) audio-video technology, and/or his healthcare decision maker, is aware that this patient-initiated, Telehealth encounter on 6/10/2020 is a billable service, with coverage as determined by his insurance carrier. He is aware that he may receive a bill and has provided verbal consent to proceed: Yes. Patient identification was verified prior to start of the visit. A caregiver was present when appropriate. Due to this being a TeleHealth encounter (during [facility] emergency), evaluation of the following organ systems was limited: VS/Constituional/EENT/Resp/CV/GI//MS/Neuro/Skin/Heme-Lymph-Imm. Pursuant to the emergency declaration under the [facility] [address] waiver authority and the [email or web address] Act, this Virtual Visit was conducted, with patient's (and/or legal guardian's) consent, to reduce the patient's risk of exposure to COVID-19 and provide necessary medical care. Services were provided through a synchronous discussion virtually to substitute for in-person clinic visit. I was in the office. The patient was at home. Assessment & Plan:   Diagnoses and all orders for this visit:    1. Essential hypertension -BP at times high. Will let me know me BP readings in next few weeks.   -     [facility] BP FLOWSHEET    2. Atrial tachycardia (HCC) - stable. 3. Primary osteoarthritis of both knees - Continue meds. Tylenol as needed. 4. JINA (generalized anxiety disorder) - anxiety well controlled. No need for meds now. 5. Other male erectile dysfunction - discussed meds and they are only minimally effective. Consider injections and he will let me know.            Subjective:   Mark Ardon was seen for Hypertension      Presents for a follow-up visit. Has been generally feeling well. His blood pressures have at times been as high as the 140s. Denies headaches. No dizziness. No nosebleeds. No chest pains or shortness of breath. No cough or wheeze. No change in bowel or bladder habits. Minimally effective with Cialis with no side effects. Some occasional joint aches and stiffness. Prior to Admission medications    Medication Sig Start Date End Date Taking? Authorizing Provider   tadalafiL (Cialis) 20 mg tablet Take 1 Tab by mouth as needed for Erectile Dysfunction. 5/11/20  Yes Alondra Plaza MD   amLODIPine (NORVASC) 10 mg tablet Take 1 tablet by mouth once daily 5/4/20  Yes Alondra Plaza MD   cetirizine (ZYRTEC) 10 mg tablet Take 1 tablet by mouth nightly 3/13/20  Yes Alondra Plaza MD   b complex vitamins tablet Take 1 Tab by mouth daily. Yes Provider, Historical   triamcinolone acetonide (KENALOG) 0.1 % topical cream Apply  to affected area two (2) times daily as needed for Skin Irritation. use thin layer 11/19/18  Yes Alondra Plaza MD   aspirin delayed-release 81 mg tablet Take 81 mg by mouth daily. Yes Provider, Historical   sertraline (ZOLOFT) 50 mg tablet Take 1 Tab by mouth daily. 12/9/19 6/10/20  Alondra Plaza MD   multivit-mins no.63/iron/folic (M-VIT PO) Take  by mouth. 6/10/20  Provider, Historical       No Known Allergies    Patient Active Problem List    Diagnosis Date Noted    Other male erectile dysfunction 06/10/2020    JINA (generalized anxiety disorder) 12/09/2019    Atrial tachycardia ([facility] [address] 11/19/2018    Advance directive discussed with patient 11/10/2016    OA (osteoarthritis) 08/19/2011    HTN (hypertension) 02/01/2011     Current Outpatient Medications   Medication Sig Dispense Refill    tadalafiL (Cialis) 20 mg tablet Take 1 Tab by mouth as needed for Erectile Dysfunction.  20 Tab 1    amLODIPine (NORVASC) 10 mg tablet Take 1 tablet by mouth once daily 90 Tab 0    cetirizine (ZYRTEC) 10 mg tablet Take 1 tablet by mouth nightly 30 Tab 0    b complex vitamins tablet Take 1 Tab by mouth daily.  triamcinolone acetonide (KENALOG) 0.1 % topical cream Apply  to affected area two (2) times daily as needed for Skin Irritation. use thin layer 80 g 1    aspirin delayed-release 81 mg tablet Take 81 mg by mouth daily. No Known Allergies  Past Medical History:   Diagnosis Date    JINA (generalized anxiety disorder) 12/9/2019    Hypertension     OA (osteoarthritis) 8/19/2011     Past Surgical History:   Procedure Laterality Date    HX CATARACT REMOVAL Left 1993    left     HX CATARACT REMOVAL Right 2018    HX HIP REPLACEMENT Left 01/1999    HX HIP REPLACEMENT Right     Right x2    CA ANESTH,KNEE AREA SURGERY  1966     Family History   Problem Relation Age of Onset    Lung Disease Mother     Heart Disease Father     Hypertension Sister     Hypertension Brother     Hypertension Son     Hypertension Daughter      Social History     Tobacco Use    Smoking status: Never Smoker    Smokeless tobacco: Never Used   Substance Use Topics    Alcohol use:  Yes     Alcohol/week: 1.7 standard drinks     Types: 2 Standard drinks or equivalent per week     Frequency: Monthly or less     Drinks per session: 1 or 2     Binge frequency: Never     Comment: socially           ROS - per HPI      Objective:     General: alert, cooperative, no distress   Mental  status: normal mood, behavior, speech, dress, motor activity, and thought processes, able to follow commands   Eyes: EOM intact, normal sclera   Mouth: not examined   Neck: no visualized mass   Resp: normal effort and no respiratory distress   Neuro: no gross deficits   Musculoskeletal: normal ROM of neck   Skin: no discoloration or lesions of concern on visible areas   Psychiatric: normal affect, no hallucinations         We discussed the expected course, resolution and complications of the diagnosis(es) in detail. Medication risks, benefits, costs, interactions, and alternatives were discussed as indicated. I advised him to contact the office if his condition worsens, changes or fails to improve as anticipated. He expressed understanding with the diagnosis(es) and plan.      Sheryl Lopez MD

## 2020-07-27 RX ORDER — CETIRIZINE HCL 10 MG
TABLET ORAL
Qty: 90 TAB | Refills: 3 | Status: SHIPPED | OUTPATIENT
Start: 2020-07-27 | End: 2021-12-13

## 2020-08-05 RX ORDER — AMLODIPINE BESYLATE 10 MG/1
TABLET ORAL
Qty: 90 TAB | Refills: 0 | Status: SHIPPED | OUTPATIENT
Start: 2020-08-05 | End: 2020-11-05

## 2020-11-05 RX ORDER — AMLODIPINE BESYLATE 10 MG/1
TABLET ORAL
Qty: 90 TAB | Refills: 0 | Status: SHIPPED | OUTPATIENT
Start: 2020-11-05 | End: 2021-02-03

## 2020-11-11 ENCOUNTER — OFFICE VISIT (OUTPATIENT)
Dept: INTERNAL MEDICINE CLINIC | Age: 70
End: 2020-11-11
Payer: MEDICARE

## 2020-11-11 VITALS
RESPIRATION RATE: 16 BRPM | OXYGEN SATURATION: 100 % | BODY MASS INDEX: 27.94 KG/M2 | WEIGHT: 178 LBS | HEIGHT: 67 IN | HEART RATE: 70 BPM | SYSTOLIC BLOOD PRESSURE: 150 MMHG | DIASTOLIC BLOOD PRESSURE: 82 MMHG | TEMPERATURE: 97.1 F

## 2020-11-11 DIAGNOSIS — I10 ESSENTIAL HYPERTENSION: ICD-10-CM

## 2020-11-11 DIAGNOSIS — Z12.5 PROSTATE CANCER SCREENING: ICD-10-CM

## 2020-11-11 DIAGNOSIS — Z00.00 MEDICARE ANNUAL WELLNESS VISIT, SUBSEQUENT: Primary | ICD-10-CM

## 2020-11-11 DIAGNOSIS — I47.1 ATRIAL TACHYCARDIA (HCC): ICD-10-CM

## 2020-11-11 DIAGNOSIS — M17.0 PRIMARY OSTEOARTHRITIS OF BOTH KNEES: ICD-10-CM

## 2020-11-11 LAB
ALBUMIN SERPL-MCNC: 4.3 G/DL (ref 3.5–5)
ALBUMIN/GLOB SERPL: 1.6 {RATIO} (ref 1.1–2.2)
ALP SERPL-CCNC: 68 U/L (ref 45–117)
ALT SERPL-CCNC: 32 U/L (ref 12–78)
ANION GAP SERPL CALC-SCNC: 7 MMOL/L (ref 5–15)
AST SERPL-CCNC: 37 U/L (ref 15–37)
BASOPHILS # BLD: 0.1 K/UL (ref 0–0.1)
BASOPHILS NFR BLD: 1 % (ref 0–1)
BILIRUB SERPL-MCNC: 0.7 MG/DL (ref 0.2–1)
BUN SERPL-MCNC: 14 MG/DL (ref 6–20)
BUN/CREAT SERPL: 16 (ref 12–20)
CALCIUM SERPL-MCNC: 9.2 MG/DL (ref 8.5–10.1)
CHLORIDE SERPL-SCNC: 105 MMOL/L (ref 97–108)
CHOLEST SERPL-MCNC: 170 MG/DL
CO2 SERPL-SCNC: 26 MMOL/L (ref 21–32)
CREAT SERPL-MCNC: 0.87 MG/DL (ref 0.7–1.3)
DIFFERENTIAL METHOD BLD: NORMAL
EOSINOPHIL # BLD: 0.3 K/UL (ref 0–0.4)
EOSINOPHIL NFR BLD: 4 % (ref 0–7)
ERYTHROCYTE [DISTWIDTH] IN BLOOD BY AUTOMATED COUNT: 14.2 % (ref 11.5–14.5)
GLOBULIN SER CALC-MCNC: 2.7 G/DL (ref 2–4)
GLUCOSE SERPL-MCNC: 77 MG/DL (ref 65–100)
HCT VFR BLD AUTO: 46.5 % (ref 36.6–50.3)
HDLC SERPL-MCNC: 65 MG/DL
HDLC SERPL: 2.6 {RATIO} (ref 0–5)
HGB BLD-MCNC: 15.7 G/DL (ref 12.1–17)
IMM GRANULOCYTES # BLD AUTO: 0 K/UL (ref 0–0.04)
IMM GRANULOCYTES NFR BLD AUTO: 0 % (ref 0–0.5)
LDLC SERPL CALC-MCNC: 93.4 MG/DL (ref 0–100)
LIPID PROFILE,FLP: NORMAL
LYMPHOCYTES # BLD: 2 K/UL (ref 0.8–3.5)
LYMPHOCYTES NFR BLD: 26 % (ref 12–49)
MCH RBC QN AUTO: 29.2 PG (ref 26–34)
MCHC RBC AUTO-ENTMCNC: 33.8 G/DL (ref 30–36.5)
MCV RBC AUTO: 86.6 FL (ref 80–99)
MONOCYTES # BLD: 0.7 K/UL (ref 0–1)
MONOCYTES NFR BLD: 9 % (ref 5–13)
NEUTS SEG # BLD: 4.7 K/UL (ref 1.8–8)
NEUTS SEG NFR BLD: 60 % (ref 32–75)
NRBC # BLD: 0 K/UL (ref 0–0.01)
NRBC BLD-RTO: 0 PER 100 WBC
PLATELET # BLD AUTO: 351 K/UL (ref 150–400)
PMV BLD AUTO: 11 FL (ref 8.9–12.9)
POTASSIUM SERPL-SCNC: 4.3 MMOL/L (ref 3.5–5.1)
PROT SERPL-MCNC: 7 G/DL (ref 6.4–8.2)
PSA SERPL-MCNC: 1.6 NG/ML (ref 0.01–4)
RBC # BLD AUTO: 5.37 M/UL (ref 4.1–5.7)
SODIUM SERPL-SCNC: 138 MMOL/L (ref 136–145)
TRIGL SERPL-MCNC: 58 MG/DL (ref ?–150)
VLDLC SERPL CALC-MCNC: 11.6 MG/DL
WBC # BLD AUTO: 7.8 K/UL (ref 4.1–11.1)

## 2020-11-11 PROCEDURE — G8419 CALC BMI OUT NRM PARAM NOF/U: HCPCS | Performed by: INTERNAL MEDICINE

## 2020-11-11 PROCEDURE — G8753 SYS BP > OR = 140: HCPCS | Performed by: INTERNAL MEDICINE

## 2020-11-11 PROCEDURE — G0463 HOSPITAL OUTPT CLINIC VISIT: HCPCS | Performed by: INTERNAL MEDICINE

## 2020-11-11 PROCEDURE — 99213 OFFICE O/P EST LOW 20 MIN: CPT | Performed by: INTERNAL MEDICINE

## 2020-11-11 PROCEDURE — G8754 DIAS BP LESS 90: HCPCS | Performed by: INTERNAL MEDICINE

## 2020-11-11 PROCEDURE — G8536 NO DOC ELDER MAL SCRN: HCPCS | Performed by: INTERNAL MEDICINE

## 2020-11-11 PROCEDURE — 3017F COLORECTAL CA SCREEN DOC REV: CPT | Performed by: INTERNAL MEDICINE

## 2020-11-11 PROCEDURE — G0439 PPPS, SUBSEQ VISIT: HCPCS | Performed by: INTERNAL MEDICINE

## 2020-11-11 PROCEDURE — G8510 SCR DEP NEG, NO PLAN REQD: HCPCS | Performed by: INTERNAL MEDICINE

## 2020-11-11 PROCEDURE — 1101F PT FALLS ASSESS-DOCD LE1/YR: CPT | Performed by: INTERNAL MEDICINE

## 2020-11-11 PROCEDURE — G8427 DOCREV CUR MEDS BY ELIG CLIN: HCPCS | Performed by: INTERNAL MEDICINE

## 2020-11-11 RX ORDER — MUPIROCIN 20 MG/G
OINTMENT TOPICAL DAILY
COMMUNITY
End: 2022-03-22

## 2020-11-11 RX ORDER — HYDROCHLOROTHIAZIDE 12.5 MG/1
12.5 TABLET ORAL DAILY
Qty: 90 TAB | Refills: 1 | Status: SHIPPED | OUTPATIENT
Start: 2020-11-11 | End: 2021-01-18

## 2020-11-11 NOTE — PROGRESS NOTES
This is the Subsequent Medicare Annual Wellness Exam, performed 12 months or more after the Initial AWV or the last Subsequent AWV    I have reviewed the patient's medical history in detail and updated the computerized patient record. As well as a follow-up of his health issues. Has been generally feeling well. His blood pressures at home have been under better control than here. Generally in the 1 20-1 50 range. Continues to have some arthritis pain in his arms/shoulders/hips/knees. A complete review of systems is otherwise negative.   He is up-to-date on visits with the dermatologist.    Physical Examination: General appearance - alert, well appearing, and in no distress  Ears - bilateral TM's and external ear canals normal  Nose - normal and patent, no erythema, discharge or polyps  Mouth - mucous membranes moist, pharynx normal without lesions  Neck - supple, no significant adenopathy  Lymphatics - no palpable lymphadenopathy, no hepatosplenomegaly  Chest - clear to auscultation, no wheezes, rales or rhonchi, symmetric air entry  Heart - normal rate and regular rhythm  Abdomen - soft, nontender, nondistended, no masses or organomegaly  Back exam - full range of motion, no tenderness, palpable spasm or pain on motion  Neurological - alert, oriented, normal speech, no focal findings or movement disorder noted, motor and sensory grossly normal bilaterally  Musculoskeletal - no joint tenderness, deformity or swelling  Extremities - peripheral pulses normal, no pedal edema, no clubbing or cyanosis      Depression Risk Factor Screening:     3 most recent PHQ Screens 11/11/2020   Little interest or pleasure in doing things Several days   Feeling down, depressed, irritable, or hopeless Several days   Total Score PHQ 2 2   Trouble falling or staying asleep, or sleeping too much Not at all   Feeling tired or having little energy Not at all   Poor appetite, weight loss, or overeating Not at all   Feeling bad about yourself - or that you are a failure or have let yourself or your family down Not at all   Trouble concentrating on things such as school, work, reading, or watching TV Not at all   Moving or speaking so slowly that other people could have noticed; or the opposite being so fidgety that others notice Not at all   Thoughts of being better off dead, or hurting yourself in some way Not at all   PHQ 9 Score 2   How difficult have these problems made it for you to do your work, take care of your home and get along with others Not difficult at all       Alcohol Risk Screen   Do you average more than 1 drink per night or more than 7 drinks a week: No    In the past three months have you have had more than 4 drinks containing alcohol on one occasion: No        Functional Ability and Level of Safety:   Hearing: Hearing is good. Activities of Daily Living: The home contains: no safety equipment. Patient does total self care     Ambulation: with no difficulty     Fall Risk:  Fall Risk Assessment, last 12 mths 11/11/2020   Able to walk? Yes   Fall in past 12 months? No   Fall with injury? -   Number of falls in past 12 months -   Fall Risk Score -     Abuse Screen:  Patient is not abused       Cognitive Screening   Has your family/caregiver stated any concerns about your memory: no         Assessment/Plan   Education and counseling provided:  Are appropriate based on today's review and evaluation  Prostate cancer screening tests (PSA, covered annually)  Diabetes screening test    Diagnoses and all orders for this visit:    1. Essential hypertensionappears not well controlled. We will add a low-dose of diuretic and see if that is helpful. He will monitor his blood pressures and send them to me over the next 2 weeks. 2. Atrial tachycardia (HCC)followed by cardiology and has a follow-up visit in December. Appears stable.     3. Primary osteoarthritis of both kneescontinue to follow-up with orthopedics as needed. 4. Medicare annual wellness visit, subsequent        Health Maintenance Due     Health Maintenance Due   Topic Date Due    Shingrix Vaccine Age 49> (1 of 2) 03/23/1998    GLAUCOMA SCREENING Q2Y  05/15/2020    Medicare Yearly Exam  11/06/2020       Patient Care Team   Patient Care Team:  Katie Eason MD as PCP - General  K[facility] Eason [facility] PCP [facility]  Jeramie Crooks MD (Cardiology)    History     Patient Active Problem List   Diagnosis Code    HTN (hypertension) I10    OA (osteoarthritis) M19.90    Advance directive discussed with patient Z70.80    Atrial tachycardia ([facility] [address] I47.1    JINA (generalized anxiety disorder) F41.1    Other male erectile dysfunction N52.8     Past Medical History:   Diagnosis Date    JINA (generalized anxiety disorder) 12/9/2019    Hypertension     OA (osteoarthritis) 8/19/2011      Past Surgical History:   Procedure Laterality Date    HX CATARACT REMOVAL Left 1993    left     HX CATARACT REMOVAL Right 2018    HX HIP REPLACEMENT Left 01/1999    HX HIP REPLACEMENT Right     Right x2    [place] ANESTH,KNEE AREA SURGERY  1966     Current Outpatient Medications   Medication Sig Dispense Refill    mupirocin (BACTROBAN) 2 % ointment Apply  to affected area daily.  OTHER Shaklee      amLODIPine (NORVASC) 10 mg tablet Take 1 tablet by mouth once daily 90 Tab 0    cetirizine (ZYRTEC) 10 mg tablet Take 1 tablet by mouth nightly 90 Tab 3    tadalafiL (Cialis) 20 mg tablet Take 1 Tab by mouth as needed for Erectile Dysfunction. 20 Tab 1    b complex vitamins tablet Take 1 Tab by mouth daily.  triamcinolone acetonide (KENALOG) 0.1 % topical cream Apply  to affected area two (2) times daily as needed for Skin Irritation. use thin layer 80 g 1    aspirin delayed-release 81 mg tablet Take 81 mg by mouth daily.        No Known Allergies    Family History   Problem Relation Age of Onset    Lung Disease Mother     Heart Disease Father  Hypertension Sister     Hypertension Brother     Hypertension Son     Hypertension Daughter      Social History     Tobacco Use    Smoking status: Never Smoker    Smokeless tobacco: Never Used   Substance Use Topics    Alcohol use:  Yes     Alcohol/week: 1.7 standard drinks     Types: 2 Standard drinks or equivalent per week     Frequency: Monthly or less     Drinks per session: 1 or 2     Binge frequency: Never     Comment: socially

## 2020-11-11 NOTE — PATIENT INSTRUCTIONS
Medicare Wellness Visit, Male The best way to live healthy is to have a lifestyle where you eat a well-balanced diet, exercise regularly, limit alcohol use, and quit all forms of tobacco/nicotine, if applicable. Regular preventive services are another way to keep healthy. Preventive services (vaccines, screening tests, monitoring & exams) can help personalize your care plan, which helps you manage your own care. Screening tests can find health problems at the earliest stages, when they are easiest to treat. [facility] follows the current, evidence-based guidelines published by the [facility]akar Wilfredo ([facility]) when recommending preventive services for our patients. Because we follow these guidelines, sometimes recommendations change over time as research supports it. (For example, a prostate screening blood test is no longer routinely recommended for men with no symptoms). Of course, you and your doctor may decide to screen more often for some diseases, based on your risk and co-morbidities (chronic disease you are already diagnosed with). Preventive services for you include: - Medicare offers their members a free annual wellness visit, which is time for you and your primary care provider to discuss and plan for your preventive service needs. Take advantage of this benefit every year! 
-All adults over age 72 should receive the recommended pneumonia vaccines. Current USPSTF guidelines recommend a series of two vaccines for the best pneumonia protection.  
-All adults should have a flu vaccine yearly and tetanus vaccine every 10 years. 
-All adults age 48 and older should receive the shingles vaccines (series of two vaccines).       
-All adults age 38-68 who are overweight should have a diabetes screening test once every three years.  
-Other screening tests & preventive services for persons with diabetes include: an eye exam to screen for diabetic retinopathy, a kidney function test, a foot exam, and stricter control over your cholesterol.  
-Cardiovascular screening for adults with routine risk involves an electrocardiogram (ECG) at intervals determined by the provider.  
-Colorectal cancer screening should be done for adults age 54-65 with no increased risk factors for colorectal cancer. There are a number of acceptable methods of screening for this type of cancer. Each test has its own benefits and drawbacks. Discuss with your provider what is most appropriate for you during your annual wellness visit. The different tests include: colonoscopy (considered the best screening method), a fecal occult blood test, a fecal DNA test, and sigmoidoscopy. 
-All adults born between [facility] should be screened once for Hepatitis C. 
-An Abdominal Aortic Aneurysm (AAA) Screening is recommended for men age 73-68 who has ever smoked in their lifetime. Here is a list of your current Health Maintenance items (your personalized list of preventive services) with a due date: 
Health Maintenance Due Topic Date Due  Shingles Vaccine (1 of 2) 03/23/1998  Glaucoma Screening   05/15/2020 [facility] Annual Well Visit  11/06/2020

## 2020-11-12 LAB — TSH SERPL-ACNC: 2.23 UIU/ML (ref 0.45–4.5)

## 2021-01-18 ENCOUNTER — OFFICE VISIT (OUTPATIENT)
Dept: CARDIOLOGY CLINIC | Age: 71
End: 2021-01-18
Payer: MEDICARE

## 2021-01-18 VITALS
SYSTOLIC BLOOD PRESSURE: 130 MMHG | DIASTOLIC BLOOD PRESSURE: 88 MMHG | HEIGHT: 67 IN | WEIGHT: 178 LBS | HEART RATE: 64 BPM | BODY MASS INDEX: 27.94 KG/M2 | OXYGEN SATURATION: 98 %

## 2021-01-18 DIAGNOSIS — I47.1 ATRIAL TACHYCARDIA (HCC): Primary | ICD-10-CM

## 2021-01-18 PROCEDURE — G8752 SYS BP LESS 140: HCPCS | Performed by: SPECIALIST

## 2021-01-18 PROCEDURE — G8536 NO DOC ELDER MAL SCRN: HCPCS | Performed by: SPECIALIST

## 2021-01-18 PROCEDURE — G8427 DOCREV CUR MEDS BY ELIG CLIN: HCPCS | Performed by: SPECIALIST

## 2021-01-18 PROCEDURE — 93010 ELECTROCARDIOGRAM REPORT: CPT | Performed by: SPECIALIST

## 2021-01-18 PROCEDURE — 1101F PT FALLS ASSESS-DOCD LE1/YR: CPT | Performed by: SPECIALIST

## 2021-01-18 PROCEDURE — 93005 ELECTROCARDIOGRAM TRACING: CPT | Performed by: SPECIALIST

## 2021-01-18 PROCEDURE — G0463 HOSPITAL OUTPT CLINIC VISIT: HCPCS | Performed by: SPECIALIST

## 2021-01-18 PROCEDURE — 3017F COLORECTAL CA SCREEN DOC REV: CPT | Performed by: SPECIALIST

## 2021-01-18 PROCEDURE — G8432 DEP SCR NOT DOC, RNG: HCPCS | Performed by: SPECIALIST

## 2021-01-18 PROCEDURE — G8419 CALC BMI OUT NRM PARAM NOF/U: HCPCS | Performed by: SPECIALIST

## 2021-01-18 PROCEDURE — G8754 DIAS BP LESS 90: HCPCS | Performed by: SPECIALIST

## 2021-01-18 PROCEDURE — 99214 OFFICE O/P EST MOD 30 MIN: CPT | Performed by: SPECIALIST

## 2021-01-18 RX ORDER — HYDROCHLOROTHIAZIDE 25 MG/1
25 TABLET ORAL DAILY
Qty: 90 TAB | Refills: 3 | Status: SHIPPED | OUTPATIENT
Start: 2021-01-18 | End: 2021-11-18

## 2021-01-18 NOTE — PROGRESS NOTES
[facility]                                                            OFFICE NOTE        Vaishnavi Velázquez M.D.,EDDI. Luz Maria Sides   1950  [id number]    Date/Time:  1/18/20219:45 AM        ICD-10-CM ICD-9-CM    1. Atrial tachycardia (HCC)  I47.1 427.89 AMB POC EKG ROUTINE W/ 12 LEADS, INTER & REP         SUBJECTIVE:  Doing well no cp or sob or palpitations or dizziness  He remains relatively active       Assessment/Plan  HTN: borderline diastolic elevation  He tells me that it is similar at home    Continue with increased dose of norvasc     He has changed some of his diet to reduce the intake of salt.  He continues to exercise routinely.      Increase hctz to 25 mg daily    He will keep an eye on his bp     Bradycardia: his electrocardiogram  revealed a normal sinus rhythm with 1 isolated premature atrial contraction.  No significant ST-T wave abnormalities are noted.  He likely has minimal conduction disease but at this point is really no need for further intervention.      CA score : previously discussed significance and implication of ca score results  Normal stress echo on 7/19  aggressive reduction in risk factors   ldl level now<70     Continue asa for now    Repeat stress test on the back burner for now     His cholesterol is closely followed by his pcp ldl level needed now <70  Consider statin if ldl still >70 at the next OV    Discussed nutrition     Otherwise see in 12 months           HPI   65 y.o. male. Patient with h/o HTN seen here for evaluation of HTN and bradycardia  ECHO on 4/17:Left ventricle: Systolic function was normal. Ejection fraction was  estimated in the range of 55 % to 60 %. There were no regional wall motion  abnormalities. Mitral valve: There was mild regurgitation. Aortic valve: Normal valve structure. The valve was trileaflet.  Leaflets  exhibited mildly increased thickness, normal cuspal separation, and  sclerosis. Tricuspid valve: There was mild regurgitation. HOLTER on 5/9/17: sinus bradycardia  with average of 58 frequent pacs and rare pvcs, 8 episodes of possible PAT with the longest of 31 beats at 165     Ca score on 6/19:Left main coronary artery: 0   Left anterior descending coronary artery: 170  Left circumflex coronary artery: 0  Right coronary artery: 33     Total calcium score: 203     NORMAL STRESS ECHO on 7/19 but htn response              CARDIAC STUDIES        07/22/19   ECHO STRESS 07/24/2019 7/30/2019    Narrative · Normal stress echocardiogram. Low risk study. Signed by: Xiomara Roman MD                                 EKG Results     Procedure 720 Value Units Date/Time    AMB POC EKG ROUTINE W/ 12 LEADS, INTER & REP [[id number]]     Order Status: Sent               IMAGING      MRI Results (most recent):  No results found for this or any previous visit. CT Results (most recent):  Results from Hospital Encounter encounter on 06/10/19   CT HEART W/O CONT WITH CALCIUM    Narrative EXAM:  CT HEART W/O CONT WITH CALCIUM    INDICATION:  Preventive. Screening. COMPARISON: None. TECHNIQUE: CT of the heart was performed without contrast. Quantitative coronary  artery CT calcium scoring was performed. CT dose reduction was achieved through  use of a standardized protocol tailored for this examination and automatic  exposure control for dose modulation. Adaptive statistical iterative  reconstruction (ASIR) was utilized. FINDINGS:  The coronary calcium score in each vessel is as follows:    Left main coronary artery: 0   Left anterior descending coronary artery: 170  Left circumflex coronary artery: 0  Right coronary artery: 33    Total calcium score: 203     Calcium score interpretation:  0-0 = No evidence of coronary artery disease (CAD).   1-10 = Minimal evidence of CAD   = Mild evidence of CAD  101-400 = Moderate evidence of CAD  >400 = Extensive evidence of CAD    Your score of 203 places you in the 30 percentile rank. That means that 70% of  men from 67-66 years old will have a higher calcium score than you. Chest CT findings: The visualized lungs are clear. The entire lung fields are  not imaged on this examination. Impression IMPRESSION: Total calcium score of 203. Moderate coronary artery disease. XR Results (most recent):  Results from Hospital Encounter encounter on 03/01/18   XR CHEST PA LAT    Narrative Chest PA and lateral    History: Cough and fever. Question left lower lobe pneumonia. Comparison: None    Findings: The lungs are well expanded. No focal consolidation, pleural  effusion, or pneumothorax. The cardiomediastinal silhouette is unremarkable. Mild spondylosis is seen in the midthoracic spine. Impression Impression:  No acute cardiopulmonary process. Past Medical History:   Diagnosis Date    JINA (generalized anxiety disorder) 12/9/2019    Hypertension     OA (osteoarthritis) 8/19/2011     Past Surgical History:   Procedure Laterality Date    HX CATARACT REMOVAL Left 1993    left     HX CATARACT REMOVAL Right 2018    HX HIP REPLACEMENT Left 01/1999    HX HIP REPLACEMENT Right     Right x2    [place] ANESTH,KNEE AREA SURGERY  1966     Social History     Tobacco Use    Smoking status: Never Smoker    Smokeless tobacco: Never Used   Substance Use Topics    Alcohol use:  Yes     Alcohol/week: 1.7 standard drinks     Types: 2 Standard drinks or equivalent per week     Frequency: Monthly or less     Drinks per session: 1 or 2     Binge frequency: Never     Comment: socially     Drug use: No     Family History   Problem Relation Age of Onset    Lung Disease Mother     Heart Disease Father     Hypertension Sister     Hypertension Brother     Hypertension Son     Hypertension Daughter      No Known Allergies      Visit Vitals  Ht 5' 7\" (1.702 m)   Wt 178 lb (80.7 kg)   BMI 27.88 kg/m²         Last 3 Recorded Weights in this Encounter    01/18/21 0936   Weight: 178 lb (80.7 kg)            Review of Systems:   Pertinent items are noted in the History of Present Illness. Visit Vitals  /88 (BP 1 Location: Left arm, BP Patient Position: Standing)   Pulse 64   Ht 5' 7\" (1.702 m)   Wt 178 lb (80.7 kg)   SpO2 98%   BMI 27.88 kg/m²     General Appearance:  Well developed, well nourished,alert and oriented x 3, and individual in no acute distress. Ears/Nose/Mouth/Throat:   Hearing grossly normal.         Neck: Supple. Chest:   Lungs clear to auscultation bilaterally. Cardiovascular:  Regular rate and rhythm, S1, S2 normal, no murmur. Abdomen:   Soft, non-tender, bowel sounds are active. Extremities: No edema bilaterally. Skin: Warm and dry. Current Outpatient Medications on File Prior to Visit   Medication Sig Dispense Refill    OTHER Shaklee      hydroCHLOROthiazide (HYDRODIURIL) 12.5 mg tablet Take 1 Tab by mouth daily. 90 Tab 1    amLODIPine (NORVASC) 10 mg tablet Take 1 tablet by mouth once daily 90 Tab 0    cetirizine (ZYRTEC) 10 mg tablet Take 1 tablet by mouth nightly 90 Tab 3    tadalafiL (Cialis) 20 mg tablet Take 1 Tab by mouth as needed for Erectile Dysfunction. 20 Tab 1    b complex vitamins tablet Take 1 Tab by mouth daily.  triamcinolone acetonide (KENALOG) 0.1 % topical cream Apply  to affected area two (2) times daily as needed for Skin Irritation. use thin layer 80 g 1    aspirin delayed-release 81 mg tablet Take 81 mg by mouth daily.  mupirocin (BACTROBAN) 2 % ointment Apply  to affected area daily. No current facility-administered medications on file prior to visit. Verl Said had no medications administered during this visit. Current Outpatient Medications   Medication Sig    OTHER Shaklee    hydroCHLOROthiazide (HYDRODIURIL) 12.5 mg tablet Take 1 Tab by mouth daily.     amLODIPine (NORVASC) 10 mg tablet Take 1 tablet by mouth once daily    cetirizine (ZYRTEC) 10 mg tablet Take 1 tablet by mouth nightly    tadalafiL (Cialis) 20 mg tablet Take 1 Tab by mouth as needed for Erectile Dysfunction.  b complex vitamins tablet Take 1 Tab by mouth daily.  triamcinolone acetonide (KENALOG) 0.1 % topical cream Apply  to affected area two (2) times daily as needed for Skin Irritation. use thin layer    aspirin delayed-release 81 mg tablet Take 81 mg by mouth daily.  mupirocin (BACTROBAN) 2 % ointment Apply  to affected area daily. No current facility-administered medications for this visit. Lab Results   Component Value Date/Time    Cholesterol, total 170 11/11/2020 03:24 PM    HDL Cholesterol 65 11/11/2020 03:24 PM    LDL, calculated 93.4 11/11/2020 03:24 PM    VLDL, calculated 11.6 11/11/2020 03:24 PM    Triglyceride 58 11/11/2020 03:24 PM    CHOL/HDL Ratio 2.6 11/11/2020 03:24 PM       Lab Results   Component Value Date/Time    Sodium 138 11/11/2020 03:24 PM    Potassium 4.3 11/11/2020 03:24 PM    Chloride 105 11/11/2020 03:24 PM    CO2 26 11/11/2020 03:24 PM    Anion gap 7 11/11/2020 03:24 PM    Glucose 77 11/11/2020 03:24 PM    BUN 14 11/11/2020 03:24 PM    Creatinine 0.87 11/11/2020 03:24 PM    BUN/Creatinine ratio 16 11/11/2020 03:24 PM    GFR est AA >60 11/11/2020 03:24 PM    GFR est non-AA >60 11/11/2020 03:24 PM    Calcium 9.2 11/11/2020 03:24 PM       Lab Results   Component Value Date/Time    ALT (SGPT) 32 11/11/2020 03:24 PM    Alk.  phosphatase 68 11/11/2020 03:24 PM    Bilirubin, total 0.7 11/11/2020 03:24 PM       Lab Results   Component Value Date/Time    WBC 7.8 11/11/2020 03:24 PM    HGB 15.7 11/11/2020 03:24 PM    HCT 46.5 11/11/2020 03:24 PM    PLATELET 201 01/21/3173 03:24 PM    MCV 86.6 11/11/2020 03:24 PM       Lab Results   Component Value Date/Time    TSH 2.230 11/11/2020 03:24 PM    TSH 3.040 05/11/2017 07:48 AM         Lab Results   Component Value Date/Time    Cholesterol, total 170 11/11/2020 03:24 PM    Cholesterol, total 168 11/06/2019 10:51 AM    Cholesterol, total 173 11/19/2018 09:03 AM    Cholesterol, total 181 11/13/2017 10:34 AM    Cholesterol, total 169 11/10/2016 04:45 PM    HDL Cholesterol 65 11/11/2020 03:24 PM    HDL Cholesterol 63 11/06/2019 10:51 AM    HDL Cholesterol 63 11/19/2018 09:03 AM    HDL Cholesterol 64 11/13/2017 10:34 AM    HDL Cholesterol 56 11/10/2016 04:45 PM    LDL, calculated 93.4 11/11/2020 03:24 PM    LDL, calculated 94 11/06/2019 10:51 AM    LDL, calculated 96 11/19/2018 09:03 AM    LDL, calculated 106 (H) 11/13/2017 10:34 AM    LDL, calculated 99 11/10/2016 04:45 PM    Triglyceride 58 11/11/2020 03:24 PM    Triglyceride 56 11/06/2019 10:51 AM    Triglyceride 70 11/19/2018 09:03 AM    Triglyceride 53 11/13/2017 10:34 AM    Triglyceride 70 11/10/2016 04:45 PM    CHOL/HDL Ratio 2.6 11/11/2020 03:24 PM    CHOL/HDL Ratio 2.9 07/21/2010 11:05 AM    CHOL/HDL Ratio 3.5 07/17/2009 09:58 AM                Please note that this dictation was completed with [employer], the [employer] voice recognition software.  Quite often unanticipated grammatical, syntax, homophones, and other interpretative errors are inadvertently transcribed by the computer software.  Please disregard these errors.  Please excuse any errors that have escaped final proofreading.

## 2021-01-18 NOTE — PROGRESS NOTES
Luis Edwards is a 65 y.o. male    Visit Vitals  /88 (BP 1 Location: Left arm, BP Patient Position: Standing)   Pulse 64   Ht 5' 7\" (1.702 m)   Wt 178 lb (80.7 kg)   SpO2 98%   BMI 27.88 kg/m²       Chief Complaint   Patient presents with    Hypertension    Irregular Heart Beat     tachy    Coronary Artery Disease       Chest pain no  SOB no  Dizziness no  Swelling no  Recent hospital visit no  Refills no

## 2021-02-03 RX ORDER — AMLODIPINE BESYLATE 10 MG/1
TABLET ORAL
Qty: 90 TAB | Refills: 0 | Status: SHIPPED | OUTPATIENT
Start: 2021-02-03 | End: 2021-05-02

## 2021-03-09 ENCOUNTER — IMMUNIZATION (OUTPATIENT)
Dept: INTERNAL MEDICINE CLINIC | Age: 71
End: 2021-03-09
Payer: MEDICARE

## 2021-03-09 DIAGNOSIS — Z23 ENCOUNTER FOR IMMUNIZATION: Primary | ICD-10-CM

## 2021-03-09 PROCEDURE — 91300 COVID-19, MRNA, LNP-S, PF, 30MCG/0.3ML DOSE(PFIZER): CPT | Performed by: FAMILY MEDICINE

## 2021-03-09 PROCEDURE — 0001A COVID-19, MRNA, LNP-S, PF, 30MCG/0.3ML DOSE(PFIZER): CPT | Performed by: FAMILY MEDICINE

## 2021-03-30 ENCOUNTER — IMMUNIZATION (OUTPATIENT)
Dept: INTERNAL MEDICINE CLINIC | Age: 71
End: 2021-03-30
Payer: MEDICARE

## 2021-03-30 DIAGNOSIS — Z23 ENCOUNTER FOR IMMUNIZATION: Primary | ICD-10-CM

## 2021-03-30 PROCEDURE — 91300 COVID-19, MRNA, LNP-S, PF, 30MCG/0.3ML DOSE(PFIZER): CPT | Performed by: FAMILY MEDICINE

## 2021-03-30 PROCEDURE — 0002A COVID-19, MRNA, LNP-S, PF, 30MCG/0.3ML DOSE(PFIZER): CPT | Performed by: FAMILY MEDICINE

## 2021-05-02 RX ORDER — AMLODIPINE BESYLATE 10 MG/1
TABLET ORAL
Qty: 90 TAB | Refills: 0 | Status: SHIPPED | OUTPATIENT
Start: 2021-05-02 | End: 2021-08-02

## 2021-08-02 RX ORDER — AMLODIPINE BESYLATE 10 MG/1
TABLET ORAL
Qty: 90 TABLET | Refills: 0 | Status: SHIPPED | OUTPATIENT
Start: 2021-08-02 | End: 2021-11-01

## 2021-11-01 RX ORDER — AMLODIPINE BESYLATE 10 MG/1
TABLET ORAL
Qty: 90 TABLET | Refills: 0 | Status: SHIPPED | OUTPATIENT
Start: 2021-11-01 | End: 2021-11-18 | Stop reason: SDUPTHER

## 2021-11-15 ENCOUNTER — OFFICE VISIT (OUTPATIENT)
Dept: INTERNAL MEDICINE CLINIC | Age: 71
End: 2021-11-15
Payer: MEDICARE

## 2021-11-15 ENCOUNTER — TELEPHONE (OUTPATIENT)
Dept: CARDIOLOGY CLINIC | Age: 71
End: 2021-11-15

## 2021-11-15 VITALS
OXYGEN SATURATION: 96 % | WEIGHT: 177.2 LBS | TEMPERATURE: 97.1 F | RESPIRATION RATE: 18 BRPM | SYSTOLIC BLOOD PRESSURE: 138 MMHG | HEART RATE: 63 BPM | BODY MASS INDEX: 27.81 KG/M2 | DIASTOLIC BLOOD PRESSURE: 82 MMHG | HEIGHT: 67 IN

## 2021-11-15 DIAGNOSIS — M17.0 PRIMARY OSTEOARTHRITIS OF BOTH KNEES: ICD-10-CM

## 2021-11-15 DIAGNOSIS — I10 PRIMARY HYPERTENSION: ICD-10-CM

## 2021-11-15 DIAGNOSIS — Z00.00 MEDICARE ANNUAL WELLNESS VISIT, SUBSEQUENT: Primary | ICD-10-CM

## 2021-11-15 DIAGNOSIS — Z12.5 PROSTATE CANCER SCREENING: ICD-10-CM

## 2021-11-15 DIAGNOSIS — N52.8 OTHER MALE ERECTILE DYSFUNCTION: ICD-10-CM

## 2021-11-15 DIAGNOSIS — I47.1 ATRIAL TACHYCARDIA (HCC): ICD-10-CM

## 2021-11-15 PROCEDURE — G8419 CALC BMI OUT NRM PARAM NOF/U: HCPCS | Performed by: INTERNAL MEDICINE

## 2021-11-15 PROCEDURE — 99214 OFFICE O/P EST MOD 30 MIN: CPT | Performed by: INTERNAL MEDICINE

## 2021-11-15 PROCEDURE — G0439 PPPS, SUBSEQ VISIT: HCPCS | Performed by: INTERNAL MEDICINE

## 2021-11-15 PROCEDURE — 3017F COLORECTAL CA SCREEN DOC REV: CPT | Performed by: INTERNAL MEDICINE

## 2021-11-15 PROCEDURE — G8752 SYS BP LESS 140: HCPCS | Performed by: INTERNAL MEDICINE

## 2021-11-15 PROCEDURE — G8536 NO DOC ELDER MAL SCRN: HCPCS | Performed by: INTERNAL MEDICINE

## 2021-11-15 PROCEDURE — G0463 HOSPITAL OUTPT CLINIC VISIT: HCPCS | Performed by: INTERNAL MEDICINE

## 2021-11-15 PROCEDURE — G8427 DOCREV CUR MEDS BY ELIG CLIN: HCPCS | Performed by: INTERNAL MEDICINE

## 2021-11-15 PROCEDURE — 93005 ELECTROCARDIOGRAM TRACING: CPT | Performed by: INTERNAL MEDICINE

## 2021-11-15 PROCEDURE — G8754 DIAS BP LESS 90: HCPCS | Performed by: INTERNAL MEDICINE

## 2021-11-15 PROCEDURE — 1101F PT FALLS ASSESS-DOCD LE1/YR: CPT | Performed by: INTERNAL MEDICINE

## 2021-11-15 PROCEDURE — G8510 SCR DEP NEG, NO PLAN REQD: HCPCS | Performed by: INTERNAL MEDICINE

## 2021-11-15 PROCEDURE — 93010 ELECTROCARDIOGRAM REPORT: CPT | Performed by: INTERNAL MEDICINE

## 2021-11-15 NOTE — PROGRESS NOTES
This is the Subsequent Medicare Annual Wellness Exam, performed 12 months or more after the Initial AWV or the last Subsequent AWV    I have reviewed the patient's medical history in detail and updated the computerized patient record. AS well as some ongoing left knee pain. NO falls. Some increase in heart rate without exertion. No chest pain or SOB. No cough or wheeze. No change in bowels or bladder. Ongoing issues with ED>     ROS - Per HPI    Physical Examination: General appearance - alert, well appearing, and in no distress  Ears - bilateral TM's and external ear canals normal  Nose - normal and patent, no erythema, discharge or polyps  Mouth - mucous membranes moist, pharynx normal without lesions  Neck - supple, no significant adenopathy  Lymphatics - no palpable lymphadenopathy, no hepatosplenomegaly  Chest - clear to auscultation, no wheezes, rales or rhonchi, symmetric air entry  Heart - irregularly irregular rhythm  Abdomen - soft, nontender, nondistended, no masses or organomegaly  Neurological - alert, oriented, normal speech, no focal findings or movement disorder noted  Musculoskeletal - abnormal exam of both knees with crepitus and DJD changes. Extremities - peripheral pulses normal, no pedal edema, no clubbing or cyanosis        Assessment/Plan   Education and counseling provided:  Are appropriate based on today's review and evaluation  Prostate cancer screening tests (PSA, covered annually)  Diabetes screening test    1. Medicare annual wellness visit, subsequent  2. Primary hypertension - BP well controlled. WIll check it at home  -     URINALYSIS W/MICROSCOPIC; Future  -     TSH 3RD GENERATION; Future  -     LIPID PANEL; Future  -     CBC WITH AUTOMATED DIFF; Future  -     METABOLIC PANEL, COMPREHENSIVE; Future  3. Primary osteoarthritis of both knees - will see Dr. Oren Gtz for an evaluation. 4. Atrial tachycardia ([address] - now with a fib on EKG.  Will arrange for urgent cardiology evaluation as well. -     AMB POC EKG ROUTINE W/ 12 LEADS, INTER & REP  5. Other male erectile dysfunction  -     REFERRAL TO UROLOGY  6. Prostate cancer screening  -     PSA SCREENING (SCREENING); Future       Depression Risk Factor Screening     3 most recent PHQ Screens 11/15/2021   Little interest or pleasure in doing things Not at all   Feeling down, depressed, irritable, or hopeless Not at all   Total Score PHQ 2 0   Trouble falling or staying asleep, or sleeping too much -   Feeling tired or having little energy -   Poor appetite, weight loss, or overeating -   Feeling bad about yourself - or that you are a failure or have let yourself or your family down -   Trouble concentrating on things such as school, work, reading, or watching TV -   Moving or speaking so slowly that other people could have noticed; or the opposite being so fidgety that others notice -   Thoughts of being better off dead, or hurting yourself in some way -   PHQ 9 Score -   How difficult have these problems made it for you to do your work, take care of your home and get along with others -       Alcohol Risk Screen    Do you average more than 1 drink per night or more than 7 drinks a week: No    In the past three months have you have had more than 4 drinks containing alcohol on one occasion: No        Functional Ability and Level of Safety    Hearing: Hearing is good. Activities of Daily Living: The home contains: no safety equipment. Patient does total self care      Ambulation: with no difficulty     Fall Risk:  Fall Risk Assessment, last 12 mths 11/15/2021   Able to walk? Yes   Fall in past 12 months? 1   Do you feel unsteady? 0   Are you worried about falling 0   Number of falls in past 12 months 2   Fall with injury?  0      Abuse Screen:  Patient is not abused       Cognitive Screening    Has your family/caregiver stated any concerns about your memory: no         Health Maintenance Due     Health Maintenance Due   Topic Date Due    Shingrix Vaccine Age 50> (1 of 2) Never done       Patient Care Team   Patient Care Team:  Figueroa Swift MD as PCP - General  Figueroa Swift MD as PCP - [facility]HABIL[facility]  Derrek Gonzalez MD (Cardiology)  Lester Garcia DO (Ophthalmology)    History     Patient Active Problem List   Diagnosis Code    HTN (hypertension) I10    OA (osteoarthritis) M19.90    Advance directive discussed with patient Z70.80    Atrial tachycardia ([address] I47.1    JINA (generalized anxiety disorder) F41.1    Other male erectile dysfunction N52.8     Past Medical History:   Diagnosis Date    JINA (generalized anxiety disorder) 12/9/2019    Hypertension     OA (osteoarthritis) 8/19/2011      Past Surgical History:   Procedure Laterality Date    HX CATARACT REMOVAL Left 1993    left     HX CATARACT REMOVAL Right 2018    HX HIP REPLACEMENT Left 01/1999    HX HIP REPLACEMENT Right     Right x2    [place] ANESTH,KNEE AREA SURGERY  1966     Current Outpatient Medications   Medication Sig Dispense Refill    amLODIPine (NORVASC) 10 mg tablet Take 1 tablet by mouth once daily 90 Tablet 0    hydroCHLOROthiazide (HYDRODIURIL) 25 mg tablet Take 1 Tab by mouth daily. 90 Tab 3    mupirocin (BACTROBAN) 2 % ointment Apply  to affected area daily.  OTHER Shaklee      cetirizine (ZYRTEC) 10 mg tablet Take 1 tablet by mouth nightly 90 Tab 3    b complex vitamins tablet Take 1 Tab by mouth daily.  triamcinolone acetonide (KENALOG) 0.1 % topical cream Apply  to affected area two (2) times daily as needed for Skin Irritation. use thin layer 80 g 1    aspirin delayed-release 81 mg tablet Take 81 mg by mouth daily.  tadalafiL (Cialis) 20 mg tablet Take 1 Tab by mouth as needed for Erectile Dysfunction.  (Patient not taking: Reported on 11/15/2021) 20 Tab 1     No Known Allergies    Family History   Problem Relation Age of Onset    Lung Disease Mother     Heart Disease Father     Hypertension Sister    Elena Lomax Hypertension Son     Hypertension Daughter     Hypertension Daughter      Social History     Tobacco Use    Smoking status: Never Smoker    Smokeless tobacco: Never Used   Substance Use Topics    Alcohol use:  Yes     Alcohol/week: 1.7 standard drinks     Types: 2 Standard drinks or equivalent per week     Comment: klaudia Bland MD

## 2021-11-15 NOTE — PATIENT INSTRUCTIONS
Medicare Wellness Visit, Male    The best way to live healthy is to have a lifestyle where you eat a well-balanced diet, exercise regularly, limit alcohol use, and quit all forms of tobacco/nicotine, if applicable. Regular preventive services are another way to keep healthy. Preventive services (vaccines, screening tests, monitoring & exams) can help personalize your care plan, which helps you manage your own care. Screening tests can find health problems at the earliest stages, when they are easiest to treat. [facility] follows the current, evidence-based guidelines published by the [facility] Prabhakar Wilfredo ([facility]) when recommending preventive services for our patients. Because we follow these guidelines, sometimes recommendations change over time as research supports it. (For example, a prostate screening blood test is no longer routinely recommended for men with no symptoms). Of course, you and your doctor may decide to screen more often for some diseases, based on your risk and co-morbidities (chronic disease you are already diagnosed with). Preventive services for you include:  - Medicare offers their members a free annual wellness visit, which is time for you and your primary care provider to discuss and plan for your preventive service needs. Take advantage of this benefit every year!  -All adults over age 72 should receive the recommended pneumonia vaccines. Current USPSTF guidelines recommend a series of two vaccines for the best pneumonia protection.   -All adults should have a flu vaccine yearly and tetanus vaccine every 10 years.  -All adults age 48 and older should receive the shingles vaccines (series of two vaccines).        -All adults age 38-68 who are overweight should have a diabetes screening test once every three years.   -Other screening tests & preventive services for persons with diabetes include: an eye exam to screen for diabetic retinopathy, a kidney function test, a foot exam, and stricter control over your cholesterol.   -Cardiovascular screening for adults with routine risk involves an electrocardiogram (ECG) at intervals determined by the provider.   -Colorectal cancer screening should be done for adults age 54-65 with no increased risk factors for colorectal cancer. There are a number of acceptable methods of screening for this type of cancer. Each test has its own benefits and drawbacks. Discuss with your provider what is most appropriate for you during your annual wellness visit. The different tests include: colonoscopy (considered the best screening method), a fecal occult blood test, a fecal DNA test, and sigmoidoscopy.  -All adults born between [facility] should be screened once for Hepatitis C.  -An Abdominal Aortic Aneurysm (AAA) Screening is recommended for men age 73-68 who has ever smoked in their lifetime.      Here is a list of your current Health Maintenance items (your personalized list of preventive services) with a due date:  Health Maintenance Due   Topic Date Due    Shingles Vaccine (1 of 2) Never done

## 2021-11-15 NOTE — PROGRESS NOTES
Chief Complaint   Patient presents with   Ian Rodriguez Annual Wellness Visit       1. Have you been to the ER, urgent care clinic since your last visit? Hospitalized since your last visit? No    2. Have you seen or consulted any other health care providers outside of the [address] [place] [address] since your last visit? Include any pap smears or colon screening.  No

## 2021-11-15 NOTE — TELEPHONE ENCOUNTER
[facility] get [facility] patient seen in the office this week, she stated the patient had a abnormal EKG in their office and has a new onset afib, please advise      Dr. Mino Ibrahim/Mervat  [phone]

## 2021-11-18 ENCOUNTER — TELEPHONE (OUTPATIENT)
Dept: CARDIOLOGY CLINIC | Age: 71
End: 2021-11-18

## 2021-11-18 ENCOUNTER — OFFICE VISIT (OUTPATIENT)
Dept: CARDIOLOGY CLINIC | Age: 71
End: 2021-11-18
Payer: MEDICARE

## 2021-11-18 VITALS
SYSTOLIC BLOOD PRESSURE: 100 MMHG | HEIGHT: 67 IN | BODY MASS INDEX: 27.47 KG/M2 | HEART RATE: 115 BPM | RESPIRATION RATE: 16 BRPM | WEIGHT: 175 LBS | OXYGEN SATURATION: 99 % | DIASTOLIC BLOOD PRESSURE: 64 MMHG

## 2021-11-18 DIAGNOSIS — I48.91 ATRIAL FIBRILLATION, UNSPECIFIED TYPE (HCC): ICD-10-CM

## 2021-11-18 DIAGNOSIS — I47.1 ATRIAL TACHYCARDIA (HCC): Primary | ICD-10-CM

## 2021-11-18 PROCEDURE — G8510 SCR DEP NEG, NO PLAN REQD: HCPCS | Performed by: SPECIALIST

## 2021-11-18 PROCEDURE — G8419 CALC BMI OUT NRM PARAM NOF/U: HCPCS | Performed by: SPECIALIST

## 2021-11-18 PROCEDURE — G8754 DIAS BP LESS 90: HCPCS | Performed by: SPECIALIST

## 2021-11-18 PROCEDURE — G8752 SYS BP LESS 140: HCPCS | Performed by: SPECIALIST

## 2021-11-18 PROCEDURE — 93005 ELECTROCARDIOGRAM TRACING: CPT | Performed by: SPECIALIST

## 2021-11-18 PROCEDURE — 1101F PT FALLS ASSESS-DOCD LE1/YR: CPT | Performed by: SPECIALIST

## 2021-11-18 PROCEDURE — G0463 HOSPITAL OUTPT CLINIC VISIT: HCPCS | Performed by: SPECIALIST

## 2021-11-18 PROCEDURE — G8536 NO DOC ELDER MAL SCRN: HCPCS | Performed by: SPECIALIST

## 2021-11-18 PROCEDURE — G8427 DOCREV CUR MEDS BY ELIG CLIN: HCPCS | Performed by: SPECIALIST

## 2021-11-18 PROCEDURE — 3017F COLORECTAL CA SCREEN DOC REV: CPT | Performed by: SPECIALIST

## 2021-11-18 PROCEDURE — 99214 OFFICE O/P EST MOD 30 MIN: CPT | Performed by: SPECIALIST

## 2021-11-18 PROCEDURE — 93010 ELECTROCARDIOGRAM REPORT: CPT | Performed by: SPECIALIST

## 2021-11-18 RX ORDER — ASCORBIC ACID 500 MG
500 TABLET ORAL DAILY
COMMUNITY

## 2021-11-18 RX ORDER — HYDROCHLOROTHIAZIDE 25 MG/1
25 TABLET ORAL EVERY OTHER DAY
Qty: 90 TABLET | Refills: 3 | Status: SHIPPED | OUTPATIENT
Start: 2021-11-18 | End: 2022-04-15

## 2021-11-18 RX ORDER — AMLODIPINE BESYLATE 5 MG/1
5 TABLET ORAL DAILY
Qty: 90 TABLET | Refills: 1 | Status: SHIPPED | OUTPATIENT
Start: 2021-11-18 | End: 2021-12-29 | Stop reason: ALTCHOICE

## 2021-11-18 RX ORDER — METOPROLOL SUCCINATE 25 MG/1
25 TABLET, EXTENDED RELEASE ORAL 2 TIMES DAILY
Qty: 180 TABLET | Refills: 1 | Status: SHIPPED | OUTPATIENT
Start: 2021-11-18 | End: 2021-12-29

## 2021-11-18 NOTE — PATIENT INSTRUCTIONS
Please STOP Aspirin. Please START taking Metoprolol 25mg twice a day. Please START taking Eliquis 5mg twice a day. Please DECREASE amlodipine (norvasc) to 5mg daily. Please DECREASE Hydrochlorothiazide to 25mg every other day. You will be scheduled for an echocardiogram and a Nuclear Stress Test after your appointment today. Nuclear stress testing evaluates blood flow to your heart muscle and assesses cardiac function. There are 2 parts (Rest/Stress) to this procedure and will include either an exercise on a treadmill or an IV administration of a stressing medication called Lexiscan. Your cardiologist will determine which type of testing is best for you. This test can be performed in one day unless it is determined that better quality images will be obtained by performing the test over two days. *Please arrive 15 minutes prior to your appointment time    Test Duration:    -One day testing will take 4 hours    Day of testing instructions:    1. NO CAFFEINE (not even decaffeinated products) 24 HOURS PRIOR TO TESTING. This includes coffee, soda, tea, chocolate, multivitamins, and migraine medication, like Excedrin or Fioricet that contains caffeine. 2. Nothing to eat or drink 4 HOURS prior to testing  3. NO NICOTINE 12 hours prior to testing  4. Hold any medications requested by your cardiologist. Otherwise take medications as directed with a few sips of water. If you are unsure you may bring your medications with you to take after instructed by your stressing nurse. It is recommended you hold NONE of your medications prior to your test. DIABETIC PATIENTS: Take half of your insulin with a light meal 4 hours before your test.  5. Wear comfortable clothes and shoes (Shirts with no metal, shorts or pants, tennis shoes, no heels or flip flops)    IMPORTANT: This testing involves a cardiac tracer ordered specifically for you.  If you are unable to make your appointment, please call to cancel/reschedule AT LEAST 24 hours prior to your appointment so your tracer can be cancelled. [phone].

## 2021-11-18 NOTE — TELEPHONE ENCOUNTER
Message rec'd from pharmacy that Eliquis denied by insurance. Preferred medications are Warfarin or Xarelto. TC to pt to advise as above. ID verified. Advised I would notify MD and get back with with him on recommendations. No further questions.

## 2021-11-18 NOTE — PROGRESS NOTES
[facility]                                                            OFFICE NOTE        Kimberley David M.D.,F.A.CHERMINIA Jose L Matthews   1950  [id number]    Date/Time:  11/18/202110:16 AM            SUBJECTIVE:  Overall he feels okay. He really has not noticed anything different. Atrial fibrillation with discoloration a routine follow-up visit with his primary care physician. He denies any chest pain shortness of breath applications fatigue dizziness syncope. Assessment/Plan    1. Atrial fibrillation: The patient remains in atrial fibrillation with relatively rapid ventricular response. His EKG reveals atrial fibrillation rate 115 PVC and minor nonspecific ST-T changes. I have had a long conversation with him regarding significance and implication of atrial fibrillation. We need of course better heart rate control for now and starting oral anticoagulation. his CHADS2 score is at least 2 at this time. Was started on metoprolol 25 mg twice a day. We will start also Eliquis 5 mg twice a day. He is aware of potential side effects of both of these medications. He is aware of severe bleeding requiring hospitalization and transfusions at times. He gives me no history of previous bleeding diathesis at this time. In order to avoid the significant drop in blood pressure will decrease the amlodipine to 5 mg daily and I will decrease hydrochlorothiazide to 25 mg every other day. Proceed with echocardiogram and Lexiscan Myoview stress test as well. He does have a previous history of significant bradycardia. This may be of some concern is using AV lizeth blocking agent for the long-term. Also potential for antiarrhythmic therapy and/or cardioversion and/or atrial fibrillation ablation have been discussed with the patient.     2.  CAD: Abnormal calcium score in 2019 we will proceed nonetheless Lexiscan Myoview stress test as above noted above. 3.  Hypertension: Currently controlled. 4.  Hyperlipidemia: Closely followed by his primary care physician. Otherwise see him back after above-mentioned test have been completed. Lab Results   Component Value Date/Time    TSH 2.41 11/15/2021 12:07 PM     Lab Results   Component Value Date/Time    Sodium 138 11/15/2021 12:07 PM    Potassium 4.8 11/15/2021 12:07 PM    Chloride 103 11/15/2021 12:07 PM    CO2 32 11/15/2021 12:07 PM    Anion gap 3 (L) 11/15/2021 12:07 PM    Glucose 89 11/15/2021 12:07 PM    BUN 14 11/15/2021 12:07 PM    Creatinine 0.98 11/15/2021 12:07 PM    BUN/Creatinine ratio 14 11/15/2021 12:07 PM    GFR est AA >60 11/15/2021 12:07 PM    GFR est non-AA >60 11/15/2021 12:07 PM    Calcium 10.2 (H) 11/15/2021 12:07 PM     Lab Results   Component Value Date/Time    WBC 7.9 11/15/2021 12:07 PM    HGB 15.0 11/15/2021 12:07 PM    HCT 46.9 11/15/2021 12:07 PM    PLATELET 358 51/73/3188 12:07 PM    MCV 84.5 11/15/2021 12:07 PM           HPI     66 y. o. male. Patient with h/o HTN seen here for evaluation of HTN and bradycardia  ECHO on 4/17:Left ventricle: Systolic function was normal. Ejection fraction was  estimated in the range of 55 % to 60 %. There were no regional wall motion  abnormalities. Mitral valve: There was mild regurgitation. Aortic valve: Normal valve structure. The valve was trileaflet. Leaflets  exhibited mildly increased thickness, normal cuspal separation, and  sclerosis. Tricuspid valve: There was mild regurgitation.   HOLTER on 5/9/17: sinus bradycardia  with average of 58 frequent pacs and rare pvcs, 8 episodes of possible PAT with the longest of 31 beats at 165     Ca score on 6/19:Left main coronary artery: 0   Left anterior descending coronary artery: 170  Left circumflex coronary artery: 0  Right coronary artery: 33     Total calcium score: 203      NORMAL STRESS ECHO on 7/19 but htn response            CARDIAC STUDIES        07/22/19    ECHO STRESS 07/24/2019 7/30/2019    Interpretation Summary  · Normal stress echocardiogram. Low risk study. Signed by: Keon Mason MD on 7/24/2019 10:04 PM                              EKG Results     None              IMAGING      MRI Results (most recent):  No results found for this or any previous visit. CT Results (most recent):  Results from Hospital Encounter encounter on 06/10/19    CT HEART W/O CONT WITH CALCIUM    Narrative  EXAM:  CT HEART W/O CONT WITH CALCIUM    INDICATION:  Preventive. Screening. COMPARISON: None. TECHNIQUE: CT of the heart was performed without contrast. Quantitative coronary  artery CT calcium scoring was performed. CT dose reduction was achieved through  use of a standardized protocol tailored for this examination and automatic  exposure control for dose modulation. Adaptive statistical iterative  reconstruction (ASIR) was utilized. FINDINGS:  The coronary calcium score in each vessel is as follows:    Left main coronary artery: 0  Left anterior descending coronary artery: 170  Left circumflex coronary artery: 0  Right coronary artery: 33    Total calcium score: 203    Calcium score interpretation:  0-0 = No evidence of coronary artery disease (CAD). 1-10 = Minimal evidence of CAD   = Mild evidence of CAD  101-400 = Moderate evidence of CAD  >400 = Extensive evidence of CAD    Your score of 203 places you in the 30 percentile rank. That means that 70% of  men from 67-66 years old will have a higher calcium score than you. Chest CT findings: The visualized lungs are clear. The entire lung fields are  not imaged on this examination. Impression  IMPRESSION: Total calcium score of 203. Moderate coronary artery disease. XR Results (most recent):  Results from Hospital Encounter encounter on 03/01/18    XR CHEST PA LAT    Narrative  Chest PA and lateral    History: Cough and fever.  Question left lower lobe pneumonia. Comparison: None    Findings: The lungs are well expanded. No focal consolidation, pleural  effusion, or pneumothorax. The cardiomediastinal silhouette is unremarkable. Mild spondylosis is seen in the midthoracic spine. Impression  Impression:  No acute cardiopulmonary process. Past Medical History:   Diagnosis Date    JINA (generalized anxiety disorder) 12/9/2019    Hypertension     OA (osteoarthritis) 8/19/2011     Past Surgical History:   Procedure Laterality Date    HX CATARACT REMOVAL Left 1993    left     HX CATARACT REMOVAL Right 2018    HX HIP REPLACEMENT Left 01/1999    HX HIP REPLACEMENT Right     Right x2    [place] ANESTH,KNEE AREA SURGERY  1966     Social History     Tobacco Use    Smoking status: Never Smoker    Smokeless tobacco: Never Used   Vaping Use    Vaping Use: Never used   Substance Use Topics    Alcohol use: Yes     Alcohol/week: 1.7 standard drinks     Types: 2 Standard drinks or equivalent per week     Comment: socially     Drug use: No     Family History   Problem Relation Age of Onset    Lung Disease Mother     Heart Disease Father     Hypertension Sister     Hypertension Son     Hypertension Daughter     Hypertension Daughter      No Known Allergies      There were no vitals taken for this visit. There were no vitals filed for this visit. Review of Systems:   Pertinent items are noted in the History of Present Illness. Neck: no JVD  Heart: irregularly irregular rhythm  Lungs: clear to auscultation bilaterally  Abdomen: soft, non-tender. Bowel sounds normal. No masses,  no organomegaly  Extremities: no edema      Current Outpatient Medications on File Prior to Visit   Medication Sig Dispense Refill    amLODIPine (NORVASC) 10 mg tablet Take 1 tablet by mouth once daily 90 Tablet 0    hydroCHLOROthiazide (HYDRODIURIL) 25 mg tablet Take 1 Tab by mouth daily.  90 Tab 3    mupirocin (BACTROBAN) 2 % ointment Apply to affected area daily.  OTHER Shaklee      cetirizine (ZYRTEC) 10 mg tablet Take 1 tablet by mouth nightly 90 Tab 3    b complex vitamins tablet Take 1 Tab by mouth daily.  triamcinolone acetonide (KENALOG) 0.1 % topical cream Apply  to affected area two (2) times daily as needed for Skin Irritation. use thin layer 80 g 1    aspirin delayed-release 81 mg tablet Take 81 mg by mouth daily. No current facility-administered medications on file prior to visit. Chelsea Spain had no medications administered during this visit. Current Outpatient Medications   Medication Sig    amLODIPine (NORVASC) 10 mg tablet Take 1 tablet by mouth once daily    hydroCHLOROthiazide (HYDRODIURIL) 25 mg tablet Take 1 Tab by mouth daily.  mupirocin (BACTROBAN) 2 % ointment Apply  to affected area daily.  OTHER Shaklee    cetirizine (ZYRTEC) 10 mg tablet Take 1 tablet by mouth nightly    b complex vitamins tablet Take 1 Tab by mouth daily.  triamcinolone acetonide (KENALOG) 0.1 % topical cream Apply  to affected area two (2) times daily as needed for Skin Irritation. use thin layer    aspirin delayed-release 81 mg tablet Take 81 mg by mouth daily. No current facility-administered medications for this visit.          Lab Results   Component Value Date/Time    Cholesterol, total 166 11/15/2021 12:07 PM    HDL Cholesterol 66 11/15/2021 12:07 PM    LDL, calculated 83.8 11/15/2021 12:07 PM    VLDL, calculated 16.2 11/15/2021 12:07 PM    Triglyceride 81 11/15/2021 12:07 PM    CHOL/HDL Ratio 2.5 11/15/2021 12:07 PM       Lab Results   Component Value Date/Time    Sodium 138 11/15/2021 12:07 PM    Potassium 4.8 11/15/2021 12:07 PM    Chloride 103 11/15/2021 12:07 PM    CO2 32 11/15/2021 12:07 PM    Anion gap 3 (L) 11/15/2021 12:07 PM    Glucose 89 11/15/2021 12:07 PM    BUN 14 11/15/2021 12:07 PM    Creatinine 0.98 11/15/2021 12:07 PM    BUN/Creatinine ratio 14 11/15/2021 12:07 PM    GFR est AA >60 11/15/2021 12:07 PM    GFR est non-AA >60 11/15/2021 12:07 PM    Calcium 10.2 (H) 11/15/2021 12:07 PM       Lab Results   Component Value Date/Time    ALT (SGPT) 36 11/15/2021 12:07 PM    Alk. phosphatase 52 11/15/2021 12:07 PM    Bilirubin, total 0.6 11/15/2021 12:07 PM       Lab Results   Component Value Date/Time    WBC 7.9 11/15/2021 12:07 PM    HGB 15.0 11/15/2021 12:07 PM    HCT 46.9 11/15/2021 12:07 PM    PLATELET 119 90/24/0233 12:07 PM    MCV 84.5 11/15/2021 12:07 PM       Lab Results   Component Value Date/Time    TSH 2.41 11/15/2021 12:07 PM         Lab Results   Component Value Date/Time    Cholesterol, total 166 11/15/2021 12:07 PM    Cholesterol, total 170 11/11/2020 03:24 PM    Cholesterol, total 168 11/06/2019 10:51 AM    Cholesterol, total 173 11/19/2018 09:03 AM    Cholesterol, total 181 11/13/2017 10:34 AM    HDL Cholesterol 66 11/15/2021 12:07 PM    HDL Cholesterol 65 11/11/2020 03:24 PM    HDL Cholesterol 63 11/06/2019 10:51 AM    HDL Cholesterol 63 11/19/2018 09:03 AM    HDL Cholesterol 64 11/13/2017 10:34 AM    LDL, calculated 83.8 11/15/2021 12:07 PM    LDL, calculated 93.4 11/11/2020 03:24 PM    LDL, calculated 94 11/06/2019 10:51 AM    LDL, calculated 96 11/19/2018 09:03 AM    LDL, calculated 106 (H) 11/13/2017 10:34 AM    Triglyceride 81 11/15/2021 12:07 PM    Triglyceride 58 11/11/2020 03:24 PM    Triglyceride 56 11/06/2019 10:51 AM    Triglyceride 70 11/19/2018 09:03 AM    Triglyceride 53 11/13/2017 10:34 AM    CHOL/HDL Ratio 2.5 11/15/2021 12:07 PM    CHOL/HDL Ratio 2.6 11/11/2020 03:24 PM    CHOL/HDL Ratio 2.9 07/21/2010 11:05 AM    CHOL/HDL Ratio 3.5 07/17/2009 09:58 AM                Please note that this dictation was completed with [employer], the computer voice recognition software. Quite often unanticipated grammatical, syntax, homophones, and other interpretative errors are inadvertently transcribed by the computer software. Please disregard these errors.   Please excuse any errors that have escaped final proofreading.

## 2021-11-18 NOTE — PROGRESS NOTES
Visit Vitals  /64   Pulse (!) 115   Resp 16   Ht 5' 7\" (1.702 m)   Wt 175 lb (79.4 kg)   SpO2 99%   BMI 27.41 kg/m²

## 2021-11-19 ENCOUNTER — TELEPHONE (OUTPATIENT)
Dept: CARDIOLOGY CLINIC | Age: 71
End: 2021-11-19

## 2021-11-19 NOTE — TELEPHONE ENCOUNTER
Noe Olguin MD  You 3 minutes ago (11:00 AM)     MG    Xarelto 20 mg daily    Message text      RX sent in as above. TC to ptBENJAMIN to advise alternate medication had been sent to pharmacy, Call w questions.

## 2021-11-19 NOTE — TELEPHONE ENCOUNTER
Patient returning phone call regarding previous encounter for Eliquis/alternative.  Requesting a call back      Phone: [phone]

## 2021-11-22 NOTE — TELEPHONE ENCOUNTER
Verified patient with two patient identifiers. Spoke with patient regarding Eliquis denied by insurance. He want's to know if he needs to go back on Asprin 81 mg daily for now.   Please advise

## 2021-11-22 NOTE — TELEPHONE ENCOUNTER
Patient's wife said that her  insurance doesn't cover Eliquis or Xarelto. Patient is frustrated because he doesn't know what to do since his insurance doesn't cover any of the medicine that the provider has prescribe or suggested. They are wondering if there are any samples that they can get at least until the [address] [place] [address] when his insurance may change.      [phone]

## 2021-11-22 NOTE — TELEPHONE ENCOUNTER
Verified patient with two patient identifiers. Spoke with patient's wife and informed her [facility] office has samples of Xarelto 20 mg (3 bottles) for him. Spoke with Radha Corcoran and she will take care of this samples for him. Per patient she will  samples tomorrow.

## 2021-11-22 NOTE — TELEPHONE ENCOUNTER
Called patient back and informed him per :       MG      2:57 PM  Xarelto 20 mg   Marvina  sent Rx. on 11/19/21  Patient verbalized understanding.

## 2021-11-22 NOTE — TELEPHONE ENCOUNTER
Patient returning phone call regarding the msg below.        Phone: [phone]  [facility]

## 2021-11-29 ENCOUNTER — TELEPHONE (OUTPATIENT)
Dept: CARDIOLOGY CLINIC | Age: 71
End: 2021-11-29

## 2021-11-29 NOTE — TELEPHONE ENCOUNTER
Patient's wife called and said that her  insurance doesn't cover Eliquis. They picked up some samples of Xarelto from [facility] and she would like to confirm the dosage patient should be taking. The sample pack is 20 mg. The wife would like to make sure he is not taking too much or too little. Patient has been taking this medicine for about a week.     [phone]

## 2021-11-30 NOTE — TELEPHONE ENCOUNTER
TC to pt, ID verified. Advised pt he should be taking 20mg daily of the Xarelto. Pt states this is what he is doing but is concerned about running out before the new year. Advised we do not have any samples of the Xarelto 20mg, but he could apply for "[employer] to got a 30 day supply of the Xarelto for 85$. Advised him to let us know when he does this and we can send the RX to the [address] that will send him the Xarelto via mail. Pt understands.

## 2021-12-09 ENCOUNTER — ANCILLARY PROCEDURE (OUTPATIENT)
Dept: CARDIOLOGY CLINIC | Age: 71
End: 2021-12-09
Payer: MEDICARE

## 2021-12-09 VITALS
HEIGHT: 67 IN | WEIGHT: 175 LBS | SYSTOLIC BLOOD PRESSURE: 120 MMHG | BODY MASS INDEX: 27.47 KG/M2 | DIASTOLIC BLOOD PRESSURE: 88 MMHG

## 2021-12-09 VITALS
DIASTOLIC BLOOD PRESSURE: 64 MMHG | HEIGHT: 67 IN | SYSTOLIC BLOOD PRESSURE: 100 MMHG | BODY MASS INDEX: 27.47 KG/M2 | WEIGHT: 175 LBS

## 2021-12-09 DIAGNOSIS — I48.91 ATRIAL FIBRILLATION, UNSPECIFIED TYPE (HCC): ICD-10-CM

## 2021-12-09 DIAGNOSIS — I47.1 ATRIAL TACHYCARDIA (HCC): ICD-10-CM

## 2021-12-09 DIAGNOSIS — I10 PRIMARY HYPERTENSION: ICD-10-CM

## 2021-12-09 PROCEDURE — 93017 CV STRESS TEST TRACING ONLY: CPT | Performed by: SPECIALIST

## 2021-12-09 PROCEDURE — 93306 TTE W/DOPPLER COMPLETE: CPT | Performed by: SPECIALIST

## 2021-12-09 PROCEDURE — A9500 TC99M SESTAMIBI: HCPCS | Performed by: SPECIALIST

## 2021-12-09 RX ORDER — TETRAKIS(2-METHOXYISOBUTYLISOCYANIDE)COPPER(I) TETRAFLUOROBORATE 1 MG/ML
30 INJECTION, POWDER, LYOPHILIZED, FOR SOLUTION INTRAVENOUS ONCE
Status: COMPLETED | OUTPATIENT
Start: 2021-12-09 | End: 2021-12-09

## 2021-12-09 RX ORDER — TETRAKIS(2-METHOXYISOBUTYLISOCYANIDE)COPPER(I) TETRAFLUOROBORATE 1 MG/ML
10 INJECTION, POWDER, LYOPHILIZED, FOR SOLUTION INTRAVENOUS ONCE
Status: COMPLETED | OUTPATIENT
Start: 2021-12-09 | End: 2021-12-09

## 2021-12-09 RX ADMIN — TETRAKIS(2-METHOXYISOBUTYLISOCYANIDE)COPPER(I) TETRAFLUOROBORATE 8.7 MILLICURIE: 1 INJECTION, POWDER, LYOPHILIZED, FOR SOLUTION INTRAVENOUS at 10:10

## 2021-12-09 RX ADMIN — REGADENOSON 0.4 MG: 0.08 INJECTION, SOLUTION INTRAVENOUS at 11:25

## 2021-12-09 RX ADMIN — TECHNETIUM TC 99M SESTAMIBI 24.4 MILLICURIE: 1 INJECTION INTRAVENOUS at 11:25

## 2021-12-09 NOTE — TELEPHONE ENCOUNTER
Pt presented to office for nuclear stress test. Requested samples of Xarelto to get him through the new year. Samples given, but advised pt to find out how much this medication would be through his insurance and to be sure he has a source for the medication after the new year. Let us know if he does not and we may need to change his medication.

## 2021-12-13 LAB
ECHO AO ASC DIAM: 3.71 CM
ECHO AO ASCENDING AORTA INDEX: 1.94 CM/M2
ECHO AO ROOT DIAM: 3.72 CM
ECHO AO ROOT INDEX: 1.95 CM/M2
ECHO AV AREA PEAK VELOCITY: 3.55 CM2
ECHO AV AREA VTI: 3.23 CM2
ECHO AV AREA/BSA PEAK VELOCITY: 1.9 CM2/M2
ECHO AV AREA/BSA VTI: 1.7 CM2/M2
ECHO AV MEAN GRADIENT: 1.76 MMHG
ECHO AV PEAK GRADIENT: 2.66 MMHG
ECHO AV PEAK VELOCITY: 81.6 CM/S
ECHO AV VTI: 15.34 CM
ECHO EST RA PRESSURE: 3 MMHG
ECHO IVC PROX: 1.56 CM
ECHO LA AREA 4C: 28.03 CM2
ECHO LA MAJOR AXIS: 4.06 CM
ECHO LA MINOR AXIS: 2.13 CM
ECHO LA VOL 2C: 96.25 ML (ref 18–58)
ECHO LA VOL 4C: 95.8 ML (ref 18–58)
ECHO LA VOL BP: 104.36 ML (ref 18–58)
ECHO LA VOL/BSA BIPLANE: 55 ML/M2 (ref 16–28)
ECHO LA VOLUME INDEX A2C: 50 ML/M2 (ref 16–28)
ECHO LA VOLUME INDEX A4C: 50 ML/M2 (ref 16–28)
ECHO LV EDV A2C: 141.67 ML
ECHO LV EDV A4C: 125.35 ML
ECHO LV EDV BP: 134.1 ML (ref 67–155)
ECHO LV EDV INDEX A4C: 66 ML/M2
ECHO LV EDV INDEX BP: 70 ML/M2
ECHO LV EDV NDEX A2C: 74 ML/M2
ECHO LV EJECTION FRACTION A2C: 32 PERCENT
ECHO LV EJECTION FRACTION A4C: 38 PERCENT
ECHO LV EJECTION FRACTION BIPLANE: 31.5 PERCENT (ref 55–100)
ECHO LV ESV A2C: 96.56 ML
ECHO LV ESV A4C: 77.81 ML
ECHO LV ESV BP: 91.88 ML (ref 22–58)
ECHO LV ESV INDEX A2C: 51 ML/M2
ECHO LV ESV INDEX A4C: 41 ML/M2
ECHO LV ESV INDEX BP: 48 ML/M2
ECHO LV FRACTIONAL SHORTENING: 16 % (ref 28–44)
ECHO LV INTERNAL DIMENSION DIASTOLE INDEX: 1.98 CM/M2
ECHO LV INTERNAL DIMENSION DIASTOLIC: 3.79 CM (ref 4.2–5.9)
ECHO LV INTERNAL DIMENSION SYSTOLIC INDEX: 1.68 CM/M2
ECHO LV INTERNAL DIMENSION SYSTOLIC: 3.2 CM
ECHO LV IVSD: 1.12 CM (ref 0.6–1)
ECHO LV MASS 2D: 145.1 G (ref 88–224)
ECHO LV MASS INDEX 2D: 76 G/M2 (ref 49–115)
ECHO LV POSTERIOR WALL DIASTOLIC: 1.2 CM (ref 0.6–1)
ECHO LV RELATIVE WALL THICKNESS RATIO: 0.63
ECHO LVOT AREA: 4.9 CM2
ECHO LVOT AV VTI INDEX: 0.67
ECHO LVOT DIAM: 2.49 CM
ECHO LVOT PEAK GRADIENT: 1.43 MMHG
ECHO LVOT PEAK VELOCITY: 59.75 CM/S
ECHO LVOT STROKE VOLUME INDEX: 26.1 ML/M2
ECHO LVOT SV: 49.8 ML
ECHO LVOT VTI: 10.23 CM
ECHO RA MAJOR AXIS INDEX: 1.9 CM/M2
ECHO RA MAJOR AXIS: 3.62 CM
ECHO RV INTERNAL DIMENSION: 3.2 CM
ECHO TV REGURGITANT MAX VELOCITY: 239.77 CM/S
ECHO TV REGURGITANT PEAK GRADIENT: 23.04 MMHG
LA VOL DISK BP: 97.21 ML (ref 18–58)
LVOT MG: 0.76 MMHG
STRESS BASELINE DIAS BP: 88 MMHG
STRESS BASELINE HR: 96 BPM
STRESS BASELINE SYS BP: 120 MMHG
STRESS O2 SAT PEAK: 100 %
STRESS O2 SAT REST: 98 %
STRESS PEAK DIAS BP: 70 MMHG
STRESS PEAK SYS BP: 120 MMHG
STRESS PERCENT HR ACHIEVED: 101 %
STRESS POST PEAK HR: 148 BPM
STRESS RATE PRESSURE PRODUCT: NORMAL BPM*MMHG
STRESS ST DEPRESSION: 0 MM
STRESS ST ELEVATION: 0 MM
STRESS TARGET HR: 147 BPM

## 2021-12-13 PROCEDURE — 78452 HT MUSCLE IMAGE SPECT MULT: CPT | Performed by: SPECIALIST

## 2021-12-13 PROCEDURE — 93306 TTE W/DOPPLER COMPLETE: CPT | Performed by: SPECIALIST

## 2021-12-13 PROCEDURE — 93018 CV STRESS TEST I&R ONLY: CPT | Performed by: SPECIALIST

## 2021-12-13 PROCEDURE — 93016 CV STRESS TEST SUPVJ ONLY: CPT | Performed by: SPECIALIST

## 2021-12-13 RX ORDER — CETIRIZINE HYDROCHLORIDE 10 MG/1
TABLET, FILM COATED ORAL
Qty: 90 TABLET | Refills: 0 | Status: SHIPPED | OUTPATIENT
Start: 2021-12-13 | End: 2022-04-15

## 2021-12-29 ENCOUNTER — OFFICE VISIT (OUTPATIENT)
Dept: CARDIOLOGY CLINIC | Age: 71
End: 2021-12-29
Payer: MEDICARE

## 2021-12-29 VITALS
BODY MASS INDEX: 27.59 KG/M2 | HEIGHT: 67 IN | HEART RATE: 90 BPM | RESPIRATION RATE: 14 BRPM | DIASTOLIC BLOOD PRESSURE: 76 MMHG | SYSTOLIC BLOOD PRESSURE: 118 MMHG | OXYGEN SATURATION: 98 % | WEIGHT: 175.8 LBS

## 2021-12-29 DIAGNOSIS — I47.1 ATRIAL TACHYCARDIA (HCC): ICD-10-CM

## 2021-12-29 DIAGNOSIS — I48.91 ATRIAL FIBRILLATION, UNSPECIFIED TYPE (HCC): ICD-10-CM

## 2021-12-29 DIAGNOSIS — I10 ESSENTIAL HYPERTENSION: Primary | ICD-10-CM

## 2021-12-29 PROCEDURE — G8752 SYS BP LESS 140: HCPCS | Performed by: SPECIALIST

## 2021-12-29 PROCEDURE — G8427 DOCREV CUR MEDS BY ELIG CLIN: HCPCS | Performed by: SPECIALIST

## 2021-12-29 PROCEDURE — G0463 HOSPITAL OUTPT CLINIC VISIT: HCPCS | Performed by: SPECIALIST

## 2021-12-29 PROCEDURE — 99215 OFFICE O/P EST HI 40 MIN: CPT | Performed by: SPECIALIST

## 2021-12-29 PROCEDURE — 1101F PT FALLS ASSESS-DOCD LE1/YR: CPT | Performed by: SPECIALIST

## 2021-12-29 PROCEDURE — G8536 NO DOC ELDER MAL SCRN: HCPCS | Performed by: SPECIALIST

## 2021-12-29 PROCEDURE — G8419 CALC BMI OUT NRM PARAM NOF/U: HCPCS | Performed by: SPECIALIST

## 2021-12-29 PROCEDURE — G8754 DIAS BP LESS 90: HCPCS | Performed by: SPECIALIST

## 2021-12-29 PROCEDURE — 3017F COLORECTAL CA SCREEN DOC REV: CPT | Performed by: SPECIALIST

## 2021-12-29 PROCEDURE — G8510 SCR DEP NEG, NO PLAN REQD: HCPCS | Performed by: SPECIALIST

## 2021-12-29 RX ORDER — LOSARTAN POTASSIUM 50 MG/1
50 TABLET ORAL DAILY
Qty: 90 TABLET | Refills: 1 | Status: SHIPPED | OUTPATIENT
Start: 2021-12-29 | End: 2022-06-27

## 2021-12-29 RX ORDER — METOPROLOL SUCCINATE 25 MG/1
25 TABLET, EXTENDED RELEASE ORAL
Qty: 180 TABLET | Refills: 1 | Status: SHIPPED | OUTPATIENT
Start: 2021-12-29 | End: 2022-02-01 | Stop reason: SDUPTHER

## 2021-12-29 RX ORDER — AMIODARONE HYDROCHLORIDE 200 MG/1
TABLET ORAL
Qty: 90 TABLET | Refills: 0 | Status: SHIPPED | OUTPATIENT
Start: 2021-12-29 | End: 2022-02-01 | Stop reason: SDUPTHER

## 2021-12-29 NOTE — PROGRESS NOTES
Visit Vitals  /76 (BP 1 Location: Left arm, BP Patient Position: Sitting, BP Cuff Size: Adult)   Pulse 90   Resp 14   Ht 5' 7\" (1.702 m)   Wt 175 lb 12.8 oz (79.7 kg)   SpO2 98%   BMI 27.53 kg/m²

## 2021-12-29 NOTE — PATIENT INSTRUCTIONS
Please STOP Amlodipine(Norvasc). Please START Losartan 50mg daily. Please DECREASE Toprol to 25mg nightly. Please START Amiodarone with he following schedule:   -400mg twice a day for 1 week   -200mg twice a day for 1 week   -200mg daily until advised otherwise    [facility] address:  [address] [place] [address], [address] [place] [address] Ave    Procedure: Transesophageal echocardiogram and cardioversion    Your procedure is scheduled for 2/1/2022 at 8:30am. You need to arrive about 2 hours prior to procedure, so please arrive by 6:30 am to [address] will proceed to the main entrance of the hospital where you will be screened and checked in. Bring your insurance information and list of current medications. You may not have anything to eat or drink after Midnight the night prior to your procedure, except for sips of water to take medications. Medication instructions: Do not hold any of your medications    *Have lab work completed no more then 30 days prior to the procedure but at least 2-3 days prior to the procedure. **You will need to be COVID tested 4 days prior to your procedure(1/28/22) at the [facility] testing located at [address] [place] [address] in the old patient discharge open M-F 7a-3p. *You will need someone to drive you home after the procedure. Plan to be at [facility] a total of 6-8 hours. *Arrange for a responsible adult to help you at home for at least 24 hours,  *Wear comfortable clothing. Leave jewelry, money, and other valuables at home. You may wear dentures, eyeglasses, and/or hearing aids. *Bring an overnight bag with you (just incase you need to spend the night.)    Post Procedure Instructions:  *No driving for 24 hours post procedure. *No heavy lifting (over 10 lbs) or strenuous activity for 48 hours. Call the office if you have a fever within 2-3 days after procedure.      Remember, when you begin lifting things, use proper body mechanics and bend with your knees, centering the weight on your legs. Please call with any questions: [phone].  You may also contact the cath lab directly at [phone]

## 2021-12-29 NOTE — PROGRESS NOTES
[facility]                                                            OFFICE NOTE        Andrew Malave M.D.,SALO Blanca Drew   1950  [id number]    Date/Time:  12/29/202110:31 AM            SUBJECTIVE:  He is doing well   no cp or sob or palpitations   no fatigue       Assessment/Plan    1. Cardiomyopathy: Unfortunately the echocardiac reveals a reduction in ejection fraction at 30 to 35%. Its significance implication of been discussed with the patient. Etiology of cardiomyopathy unclear of the patient he is asymptomatic but I do suspect that atrial fibrillation rapid ventricular response has an impact on a reduction in ejection fraction. Discussed nuclear stress test which failed to reveal any particular ischemia. Continue Toprol. Stop amlodipine. Start losartan 50 mg daily. Continue hydrochlorothiazide every other day. 2.  Atrial fibrillation: Persistent continue Xarelto no untoward effects thus far. We have a long conversation about different options. I think that rate control and anticoagulation only may not be appropriate at this point given the reduction in ejection fraction. Even if the patient is asymptomatic. We have discussed potential for cardioversion. The patient is agreeable at this time. Continue Xarelto. Decrease Toprol to 25 mg nightly. Start amiodarone 400 mg twice daily for 1 week followed by 200 mg twice daily for another week followed by 200 mg once a day. We have discussed JOJO cardioversion. We will plan to do JOJO cardioversion 3 to 4 weeks from now. We discussed potential for excessive bradycardia and side effects of amiodarone as well he will let me know if anything should occur. I discussed risks and benefits of JOJO cardioversion.   Risks include but not limited to esophageal perforation sore throat bleeding respiratory depression require intubation vocal cord injury CVA death emergency pacemaker placement permanent pacemaker placement. Patient understood and is agreeable to proceed. 3.  CAD: Abnormal calcium score in the past in 2019. Nuclear stress test December 2021 with no ischemia and in this case ejection fraction estimated 50%. 4.  Hyperlipidemia: Closely followed as primary care physician. The patient wants to be seen at the time of DC cardioversion. This will occur in next 3 to 4 weeks. HPI     66 y. o. male. Patient with h/o HTN seen here for evaluation of HTN and bradycardia  ECHO on 4/17:Left ventricle: Systolic function was normal. Ejection fraction was  estimated in the range of 55 % to 60 %. There were no regional wall motion  abnormalities. Mitral valve: There was mild regurgitation. Aortic valve: Normal valve structure. The valve was trileaflet. Leaflets  exhibited mildly increased thickness, normal cuspal separation, and  sclerosis. Tricuspid valve: There was mild regurgitation. HOLTER on 5/9/17: sinus bradycardia  with average of 58 frequent pacs and rare pvcs, 8 episodes of possible PAT with the longest of 31 beats at 165     Ca score on 6/19:Left main coronary artery: 0   Left anterior descending coronary artery: 170  Left circumflex coronary artery: 0  Right coronary artery: 33     Total calcium score: 203      NORMAL STRESS ECHO on 7/19 but htn response            CARDIAC STUDIES        12/09/21    ECHO ADULT COMPLETE 12/13/2021 12/13/2021    Interpretation Summary  · LV: Estimated LVEF is 30 - 35%. Visually measured ejection fraction. Normal cavity size. Mild concentric hypertrophy. Moderately and globally reduced systolic function. · LA: Severely dilated left atrium. · AV: Mild aortic valve regurgitation is present. · MV: Mitral valve thickening. Mild mitral valve regurgitation is present. · TV: Mild tricuspid valve regurgitation is present.  Right Ventricular Arterial Pressure (RVSP) is 26 mmHg. Pulmonary hypertension not suggested by Doppler findings. Signed by: Juan Carlos Cosme MD on 12/13/2021  2:37 PM            12/09/21    NUCLEAR CARDIAC STRESS TEST 12/13/2021 12/14/2021    Interpretation Summary  · ECG: Resting ECG demonstrates atrial fibrillation. Signed by: Juan Carlos Cosme MD on 12/13/2021  2:46 PM                    EKG Results     None              IMAGING      MRI Results (most recent):  No results found for this or any previous visit. CT Results (most recent):  Results from Hospital Encounter encounter on 06/10/19    CT HEART W/O CONT WITH CALCIUM    Narrative  EXAM:  CT HEART W/O CONT WITH CALCIUM    INDICATION:  Preventive. Screening. COMPARISON: None. TECHNIQUE: CT of the heart was performed without contrast. Quantitative coronary  artery CT calcium scoring was performed. CT dose reduction was achieved through  use of a standardized protocol tailored for this examination and automatic  exposure control for dose modulation. Adaptive statistical iterative  reconstruction (ASIR) was utilized. FINDINGS:  The coronary calcium score in each vessel is as follows:    Left main coronary artery: 0  Left anterior descending coronary artery: 170  Left circumflex coronary artery: 0  Right coronary artery: 33    Total calcium score: 203    Calcium score interpretation:  0-0 = No evidence of coronary artery disease (CAD). 1-10 = Minimal evidence of CAD   = Mild evidence of CAD  101-400 = Moderate evidence of CAD  >400 = Extensive evidence of CAD    Your score of 203 places you in the 30 percentile rank. That means that 70% of  men from 67-66 years old will have a higher calcium score than you. Chest CT findings: The visualized lungs are clear. The entire lung fields are  not imaged on this examination. Impression  IMPRESSION: Total calcium score of 203. Moderate coronary artery disease.       XR Results (most recent):  Results from [facility] encounter on 03/01/18    XR CHEST PA LAT    Narrative  Chest PA and lateral    History: Cough and fever. Question left lower lobe pneumonia. Comparison: None    Findings: The lungs are well expanded. No focal consolidation, pleural  effusion, or pneumothorax. The cardiomediastinal silhouette is unremarkable. Mild spondylosis is seen in the midthoracic spine. Impression  Impression:  No acute cardiopulmonary process. Past Medical History:   Diagnosis Date    JINA (generalized anxiety disorder) 12/9/2019    Hypertension     OA (osteoarthritis) 8/19/2011     Past Surgical History:   Procedure Laterality Date    HX CATARACT REMOVAL Left 1993    left     HX CATARACT REMOVAL Right 2018    HX HIP REPLACEMENT Left 01/1999    HX HIP REPLACEMENT Right     Right x2    MS ANESTH,KNEE AREA SURGERY  1966     Social History     Tobacco Use    Smoking status: Never Smoker    Smokeless tobacco: Never Used   Vaping Use    Vaping Use: Never used   Substance Use Topics    Alcohol use: Yes     Alcohol/week: 1.7 standard drinks     Types: 2 Standard drinks or equivalent per week     Comment: socially     Drug use: No     Family History   Problem Relation Age of Onset    Lung Disease Mother     Heart Disease Father     Hypertension Sister     Hypertension Son     Hypertension Daughter     Hypertension Daughter      No Known Allergies      Visit Vitals  /76 (BP 1 Location: Left arm, BP Patient Position: Sitting, BP Cuff Size: Adult)   Pulse 90   Resp 14   Ht 5' 7\" (1.702 m)   Wt 175 lb 12.8 oz (79.7 kg)   SpO2 98%   BMI 27.53 kg/m²         Last 3 Recorded Weights in this Encounter    12/29/21 1003   Weight: 175 lb 12.8 oz (79.7 kg)            Review of Systems:   Pertinent items are noted in the History of Present Illness. Neck: no JVD  Heart: irregularly irregular rhythm  Lungs: clear to auscultation bilaterally  Abdomen: soft, non-tender.  Bowel sounds normal. No masses,  no organomegaly  Extremities: no edema      Current Outpatient Medications on File Prior to Visit   Medication Sig Dispense Refill    Allergy Relief, cetirizine, 10 mg tablet Take 1 tablet by mouth nightly 90 Tablet 0    rivaroxaban (Xarelto) 20 mg tab tablet Take 1 Tablet by mouth daily. 28 Tablet 0    ascorbic acid, vitamin C, (Vitamin C) 500 mg tablet Take  by mouth.  zinc 50 mg tab tablet Take 50 mg by mouth daily.  metoprolol succinate (TOPROL-XL) 25 mg XL tablet Take 1 Tablet by mouth two (2) times a day. 180 Tablet 1    amLODIPine (NORVASC) 5 mg tablet Take 1 Tablet by mouth daily. 90 Tablet 1    hydroCHLOROthiazide (HYDRODIURIL) 25 mg tablet Take 1 Tablet by mouth every other day. 90 Tablet 3    mupirocin (BACTROBAN) 2 % ointment Apply  to affected area daily.  OTHER Shaklee      b complex vitamins tablet Take 1 Tab by mouth daily.  triamcinolone acetonide (KENALOG) 0.1 % topical cream Apply  to affected area two (2) times daily as needed for Skin Irritation. use thin layer 80 g 1     No current facility-administered medications on file prior to visit. [facility] had no medications administered during this visit. Current Outpatient Medications   Medication Sig    Allergy Relief, cetirizine, 10 mg tablet Take 1 tablet by mouth nightly    rivaroxaban (Xarelto) 20 mg tab tablet Take 1 Tablet by mouth daily.  ascorbic acid, vitamin C, (Vitamin C) 500 mg tablet Take  by mouth.  zinc 50 mg tab tablet Take 50 mg by mouth daily.  metoprolol succinate (TOPROL-XL) 25 mg XL tablet Take 1 Tablet by mouth two (2) times a day.  amLODIPine (NORVASC) 5 mg tablet Take 1 Tablet by mouth daily.  hydroCHLOROthiazide (HYDRODIURIL) 25 mg tablet Take 1 Tablet by mouth every other day.  mupirocin (BACTROBAN) 2 % ointment Apply  to affected area daily.  OTHER Shaklee    b complex vitamins tablet Take 1 Tab by mouth daily.     triamcinolone acetonide (KENALOG) 0.1 % topical cream Apply  to affected area two (2) times daily as needed for Skin Irritation. use thin layer     No current facility-administered medications for this visit. Lab Results   Component Value Date/Time    Cholesterol, total 166 11/15/2021 12:07 PM    HDL Cholesterol 66 11/15/2021 12:07 PM    LDL, calculated 83.8 11/15/2021 12:07 PM    VLDL, calculated 16.2 11/15/2021 12:07 PM    Triglyceride 81 11/15/2021 12:07 PM    CHOL/HDL Ratio 2.5 11/15/2021 12:07 PM       Lab Results   Component Value Date/Time    Sodium 138 11/15/2021 12:07 PM    Potassium 4.8 11/15/2021 12:07 PM    Chloride 103 11/15/2021 12:07 PM    CO2 32 11/15/2021 12:07 PM    Anion gap 3 (L) 11/15/2021 12:07 PM    Glucose 89 11/15/2021 12:07 PM    BUN 14 11/15/2021 12:07 PM    Creatinine 0.98 11/15/2021 12:07 PM    BUN/Creatinine ratio 14 11/15/2021 12:07 PM    GFR est AA >60 11/15/2021 12:07 PM    GFR est non-AA >60 11/15/2021 12:07 PM    Calcium 10.2 (H) 11/15/2021 12:07 PM       Lab Results   Component Value Date/Time    ALT (SGPT) 36 11/15/2021 12:07 PM    Alk.  phosphatase 52 11/15/2021 12:07 PM    Bilirubin, total 0.6 11/15/2021 12:07 PM       Lab Results   Component Value Date/Time    WBC 7.9 11/15/2021 12:07 PM    HGB 15.0 11/15/2021 12:07 PM    HCT 46.9 11/15/2021 12:07 PM    PLATELET 587 16/38/9375 12:07 PM    MCV 84.5 11/15/2021 12:07 PM       Lab Results   Component Value Date/Time    TSH 2.41 11/15/2021 12:07 PM         Lab Results   Component Value Date/Time    Cholesterol, total 166 11/15/2021 12:07 PM    Cholesterol, total 170 11/11/2020 03:24 PM    Cholesterol, total 168 11/06/2019 10:51 AM    Cholesterol, total 173 11/19/2018 09:03 AM    Cholesterol, total 181 11/13/2017 10:34 AM    HDL Cholesterol 66 11/15/2021 12:07 PM    HDL Cholesterol 65 11/11/2020 03:24 PM    HDL Cholesterol 63 11/06/2019 10:51 AM    HDL Cholesterol 63 11/19/2018 09:03 AM    HDL Cholesterol 64 11/13/2017 10:34 AM    LDL, calculated 83.8 11/15/2021 12:07 PM    LDL, calculated 93.4 11/11/2020 03:24 PM    LDL, calculated 94 11/06/2019 10:51 AM    LDL, calculated 96 11/19/2018 09:03 AM    LDL, calculated 106 (H) 11/13/2017 10:34 AM    Triglyceride 81 11/15/2021 12:07 PM    Triglyceride 58 11/11/2020 03:24 PM    Triglyceride 56 11/06/2019 10:51 AM    Triglyceride 70 11/19/2018 09:03 AM    Triglyceride 53 11/13/2017 10:34 AM    CHOL/HDL Ratio 2.5 11/15/2021 12:07 PM    CHOL/HDL Ratio 2.6 11/11/2020 03:24 PM    CHOL/HDL Ratio 2.9 07/21/2010 11:05 AM    CHOL/HDL Ratio 3.5 07/17/2009 09:58 AM                Please note that this dictation was completed with [employer], the [employer] voice recognition software. Quite often unanticipated grammatical, syntax, homophones, and other interpretative errors are inadvertently transcribed by the computer software. Please disregard these errors. Please excuse any errors that have escaped final proofreading.

## 2022-01-28 ENCOUNTER — TRANSCRIBE ORDER (OUTPATIENT)
Dept: REGISTRATION | Age: 72
End: 2022-01-28

## 2022-01-28 ENCOUNTER — HOSPITAL ENCOUNTER (OUTPATIENT)
Dept: PREADMISSION TESTING | Age: 72
Discharge: HOME | End: 2022-01-28
Attending: SPECIALIST
Payer: MEDICARE

## 2022-01-28 DIAGNOSIS — U07.1 COVID-19: Primary | ICD-10-CM

## 2022-01-28 DIAGNOSIS — I48.91 ATRIAL FIBRILLATION, UNSPECIFIED TYPE (HCC): ICD-10-CM

## 2022-01-28 DIAGNOSIS — U07.1 COVID-19: ICD-10-CM

## 2022-01-28 DIAGNOSIS — I10 ESSENTIAL HYPERTENSION: ICD-10-CM

## 2022-01-28 DIAGNOSIS — I47.1 ATRIAL TACHYCARDIA (HCC): ICD-10-CM

## 2022-01-28 LAB
ANION GAP SERPL CALC-SCNC: 3 MMOL/L (ref 5–15)
BASOPHILS # BLD: 0.1 K/UL (ref 0–0.1)
BASOPHILS NFR BLD: 1 % (ref 0–1)
BUN SERPL-MCNC: 23 MG/DL (ref 6–20)
BUN/CREAT SERPL: 21 (ref 12–20)
CALCIUM SERPL-MCNC: 9.3 MG/DL (ref 8.5–10.1)
CHLORIDE SERPL-SCNC: 107 MMOL/L (ref 97–108)
CO2 SERPL-SCNC: 31 MMOL/L (ref 21–32)
CREAT SERPL-MCNC: 1.08 MG/DL (ref 0.7–1.3)
DIFFERENTIAL METHOD BLD: ABNORMAL
EOSINOPHIL # BLD: 0.2 K/UL (ref 0–0.4)
EOSINOPHIL NFR BLD: 2 % (ref 0–7)
ERYTHROCYTE [DISTWIDTH] IN BLOOD BY AUTOMATED COUNT: 17.1 % (ref 11.5–14.5)
GLUCOSE SERPL-MCNC: 78 MG/DL (ref 65–100)
HCT VFR BLD AUTO: 44.3 % (ref 36.6–50.3)
HGB BLD-MCNC: 14.1 G/DL (ref 12.1–17)
IMM GRANULOCYTES # BLD AUTO: 0 K/UL (ref 0–0.04)
IMM GRANULOCYTES NFR BLD AUTO: 0 % (ref 0–0.5)
LYMPHOCYTES # BLD: 1.6 K/UL (ref 0.8–3.5)
LYMPHOCYTES NFR BLD: 17 % (ref 12–49)
MCH RBC QN AUTO: 27.8 PG (ref 26–34)
MCHC RBC AUTO-ENTMCNC: 31.8 G/DL (ref 30–36.5)
MCV RBC AUTO: 87.2 FL (ref 80–99)
MONOCYTES # BLD: 0.9 K/UL (ref 0–1)
MONOCYTES NFR BLD: 10 % (ref 5–13)
NEUTS SEG # BLD: 6.2 K/UL (ref 1.8–8)
NEUTS SEG NFR BLD: 70 % (ref 32–75)
NRBC # BLD: 0 K/UL (ref 0–0.01)
NRBC BLD-RTO: 0 PER 100 WBC
PLATELET # BLD AUTO: 346 K/UL (ref 150–400)
PMV BLD AUTO: 11.5 FL (ref 8.9–12.9)
POTASSIUM SERPL-SCNC: 4.7 MMOL/L (ref 3.5–5.1)
RBC # BLD AUTO: 5.08 M/UL (ref 4.1–5.7)
SARS-COV-2, XPLCVT: NOT DETECTED
SODIUM SERPL-SCNC: 141 MMOL/L (ref 136–145)
SOURCE, COVRS: NORMAL
TSH SERPL DL<=0.05 MIU/L-ACNC: 2.84 UIU/ML (ref 0.36–3.74)
WBC # BLD AUTO: 8.9 K/UL (ref 4.1–11.1)

## 2022-01-28 PROCEDURE — U0005 INFEC AGEN DETEC AMPLI PROBE: HCPCS

## 2022-01-31 RX ORDER — SODIUM CHLORIDE 0.9 % (FLUSH) 0.9 %
5-40 SYRINGE (ML) INJECTION AS NEEDED
Status: CANCELLED | OUTPATIENT
Start: 2022-01-31

## 2022-01-31 RX ORDER — SODIUM CHLORIDE 9 MG/ML
100 INJECTION, SOLUTION INTRAVENOUS CONTINUOUS
Status: CANCELLED | OUTPATIENT
Start: 2022-01-31 | End: 2022-01-31

## 2022-01-31 RX ORDER — SODIUM CHLORIDE 0.9 % (FLUSH) 0.9 %
5-40 SYRINGE (ML) INJECTION EVERY 8 HOURS
Status: CANCELLED | OUTPATIENT
Start: 2022-01-31

## 2022-02-01 ENCOUNTER — HOSPITAL ENCOUNTER (OUTPATIENT)
Dept: NON INVASIVE DIAGNOSTICS | Age: 72
Discharge: HOME | End: 2022-02-01
Attending: SPECIALIST
Payer: MEDICARE

## 2022-02-01 ENCOUNTER — TELEPHONE (OUTPATIENT)
Dept: CARDIOLOGY CLINIC | Age: 72
End: 2022-02-01

## 2022-02-01 ENCOUNTER — ANESTHESIA EVENT (OUTPATIENT)
Dept: NON INVASIVE DIAGNOSTICS | Age: 72
End: 2022-02-01
Payer: MEDICARE

## 2022-02-01 ENCOUNTER — ANESTHESIA (OUTPATIENT)
Dept: NON INVASIVE DIAGNOSTICS | Age: 72
End: 2022-02-01
Payer: MEDICARE

## 2022-02-01 VITALS
WEIGHT: 175 LBS | RESPIRATION RATE: 22 BRPM | OXYGEN SATURATION: 100 % | SYSTOLIC BLOOD PRESSURE: 104 MMHG | HEART RATE: 62 BPM | HEIGHT: 67 IN | DIASTOLIC BLOOD PRESSURE: 76 MMHG | BODY MASS INDEX: 27.47 KG/M2 | TEMPERATURE: 98.5 F

## 2022-02-01 DIAGNOSIS — I48.91 ATRIAL FIBRILLATION AND FLUTTER (HCC): ICD-10-CM

## 2022-02-01 DIAGNOSIS — I48.92 ATRIAL FIBRILLATION AND FLUTTER (HCC): ICD-10-CM

## 2022-02-01 LAB
ATRIAL RATE: 63 BPM
CALCULATED P AXIS, ECG09: 57 DEGREES
CALCULATED R AXIS, ECG10: -20 DEGREES
CALCULATED T AXIS, ECG11: 37 DEGREES
DIAGNOSIS, 93000: NORMAL
P-R INTERVAL, ECG05: 214 MS
Q-T INTERVAL, ECG07: 468 MS
QRS DURATION, ECG06: 96 MS
QTC CALCULATION (BEZET), ECG08: 478 MS
VENTRICULAR RATE, ECG03: 63 BPM

## 2022-02-01 PROCEDURE — 76060000031 HC ANESTHESIA FIRST 0.5 HR

## 2022-02-01 PROCEDURE — 93320 DOPPLER ECHO COMPLETE: CPT | Performed by: SPECIALIST

## 2022-02-01 PROCEDURE — 93312 ECHO TRANSESOPHAGEAL: CPT | Performed by: SPECIALIST

## 2022-02-01 PROCEDURE — 93005 ELECTROCARDIOGRAM TRACING: CPT

## 2022-02-01 PROCEDURE — 74011250636 HC RX REV CODE- 250/636: Performed by: NURSE ANESTHETIST, CERTIFIED REGISTERED

## 2022-02-01 PROCEDURE — 74011000258 HC RX REV CODE- 258: Performed by: NURSE ANESTHETIST, CERTIFIED REGISTERED

## 2022-02-01 PROCEDURE — 74011000250 HC RX REV CODE- 250: Performed by: NURSE ANESTHETIST, CERTIFIED REGISTERED

## 2022-02-01 PROCEDURE — 92960 CARDIOVERSION ELECTRIC EXT: CPT

## 2022-02-01 PROCEDURE — 92960 CARDIOVERSION ELECTRIC EXT: CPT | Performed by: SPECIALIST

## 2022-02-01 PROCEDURE — 93325 DOPPLER ECHO COLOR FLOW MAPG: CPT | Performed by: SPECIALIST

## 2022-02-01 RX ORDER — EPHEDRINE SULFATE/0.9% NACL/PF 50 MG/5 ML
SYRINGE (ML) INTRAVENOUS AS NEEDED
Status: DISCONTINUED | OUTPATIENT
Start: 2022-02-01 | End: 2022-02-01 | Stop reason: HOSPADM

## 2022-02-01 RX ORDER — AMIODARONE HYDROCHLORIDE 100 MG/1
100 TABLET ORAL DAILY
Qty: 90 TABLET | Refills: 0 | Status: SHIPPED | OUTPATIENT
Start: 2022-02-01 | End: 2022-03-09 | Stop reason: SDUPTHER

## 2022-02-01 RX ORDER — SODIUM CHLORIDE 9 MG/ML
INJECTION, SOLUTION INTRAVENOUS
Status: DISCONTINUED | OUTPATIENT
Start: 2022-02-01 | End: 2022-02-01 | Stop reason: HOSPADM

## 2022-02-01 RX ORDER — AMIODARONE HYDROCHLORIDE 100 MG/1
TABLET ORAL
Qty: 90 TABLET | Refills: 1 | Status: SHIPPED | OUTPATIENT
Start: 2022-02-01 | End: 2022-02-01

## 2022-02-01 RX ORDER — METOPROLOL SUCCINATE 25 MG/1
12.5 TABLET, EXTENDED RELEASE ORAL
Qty: 180 TABLET | Refills: 1 | Status: SHIPPED | OUTPATIENT
Start: 2022-02-01 | End: 2022-02-09 | Stop reason: ALTCHOICE

## 2022-02-01 RX ORDER — PROPOFOL 10 MG/ML
INJECTION, EMULSION INTRAVENOUS AS NEEDED
Status: DISCONTINUED | OUTPATIENT
Start: 2022-02-01 | End: 2022-02-01 | Stop reason: HOSPADM

## 2022-02-01 RX ADMIN — Medication 10 MG: at 08:54

## 2022-02-01 RX ADMIN — PROPOFOL 50 MG: 10 INJECTION, EMULSION INTRAVENOUS at 08:43

## 2022-02-01 RX ADMIN — Medication 10 MG: at 08:57

## 2022-02-01 RX ADMIN — PROPOFOL 50 MG: 10 INJECTION, EMULSION INTRAVENOUS at 08:46

## 2022-02-01 RX ADMIN — SODIUM CHLORIDE: 9 INJECTION, SOLUTION INTRAVENOUS at 08:40

## 2022-02-01 RX ADMIN — PROPOFOL 50 MG: 10 INJECTION, EMULSION INTRAVENOUS at 08:49

## 2022-02-01 NOTE — ANESTHESIA PREPROCEDURE EVALUATION
Relevant Problems   NEUROLOGY   (+) JINA (generalized anxiety disorder)      CARDIOVASCULAR   (+) Atrial tachycardia (HCC)   (+) HTN (hypertension)       Anesthetic History   No history of anesthetic complications            Review of Systems / Medical History  Patient summary reviewed, nursing notes reviewed and pertinent labs reviewed    Pulmonary  Within defined limits                 Neuro/Psych   Within defined limits           Cardiovascular    Hypertension      CHF  Dysrhythmias : atrial fibrillation      Exercise tolerance: >4 METS  Comments: EF 30-35%   GI/Hepatic/Renal  Within defined limits              Endo/Other        Arthritis     Other Findings            Physical Exam    Airway  Mallampati: II  TM Distance: 4 - 6 cm  Neck ROM: normal range of motion   Mouth opening: Normal     Cardiovascular    Rhythm: regular  Rate: normal         Dental  No notable dental hx       Pulmonary  Breath sounds clear to auscultation               Abdominal  Abdominal exam normal       Other Findings            Anesthetic Plan    ASA: 4  Anesthesia type: MAC          Induction: Intravenous  Anesthetic plan and risks discussed with: Patient

## 2022-02-01 NOTE — TELEPHONE ENCOUNTER
[address] is calling about the patient's amiodarone 100 mg and they would like to use the 200 tablet instead to conside with the instructions.  Pharmacy would like to know if they can convert the rx.,    [phone]

## 2022-02-01 NOTE — PROGRESS NOTES
Patient for JOJO cardioversion. Relatively symptomatic with atrial fibrillation but reduction in ejection fraction prompted the consideration towards restoration of normal sinus rhythm. Discussed risks and benefits of JOJO cardioversion. Risks including but not limited to esophageal perforation bleeding sore throat vocal cord damage respiratory depression require intubation as to regard the cardioversion potential for CVA death emergency pacemaker placement cutaneous burns. The patient understood and is agreeable to proceed.

## 2022-02-01 NOTE — PROGRESS NOTES
Anesthesia name: Miguel Mitchell    Anesthesia is present for case. Refer to anesthesia log for vitals.

## 2022-02-01 NOTE — PROCEDURES
Status post JOJO cardioversion. JOJO with ejection fraction approximately 40% no convincing evidence for thrombus at the level of the left atrium or left atrial appendage. Mild to moderate mitral regurgitation was noted and biatrial enlargement. Status post DC cardioversion x1 with 200 J administered in the same fashion. Patient converted back to marked sinus bradycardia with rates at times 35 for few minutes. It was noted that the slow heart rate corresponded to. Of apnea. Eventually recovery has for now remain in normal sinus rhythm. I will decrease Toprol (to be continue for now on account of his reduction in ejection fraction) to 12.5 mg daily. Decrease amiodarone to 100 mg daily. Proceed with liver function test and TSH prior to next office visit. He may have some degree of sick sinus syndrome and therefore down the line he may require a pacemaker but prior to that in my opinion sleep study should be completed. Continue with Xarelto with no interruption.

## 2022-02-01 NOTE — H&P
Date of  Admission: 2/1/2022  7:00 AM     Rosalva Hill is a 66 y.o. male admitted for Atrial fibrillation and flutter ([facility] [address] [I48.91, I48.92]  Subjective: Patient has admitted electively for JOJO cardioversion on account of reduction in ejection fraction. He was started on Toprol amiodarone and Xarelto. Ejection fraction last echocardiogram 30 to 35%. Nuclear stress test in 2019 with no ischemia. denies chest pain, chest pressure/discomfort, dyspnea, palpitations. Assessment/Plan: 1. Atrial fibrillation: Persistent start amiodarone discussed with patient in office. Proceed with JOJO cardioversion. Patient has been taking Xarelto with no interruption. Patient Active Problem List    Diagnosis Date Noted    Other male erectile dysfunction 06/10/2020    JINA (generalized anxiety disorder) 12/09/2019    Atrial tachycardia ([facility] [address] 11/19/2018    Advance directive discussed with patient 11/10/2016    OA (osteoarthritis) 08/19/2011    HTN (hypertension) 02/01/2011      Delio Bustamante MD  Past Medical History:   Diagnosis Date    JINA (generalized anxiety disorder) 12/9/2019    Hypertension     OA (osteoarthritis) 8/19/2011      Past Surgical History:   Procedure Laterality Date    HX CATARACT REMOVAL Left 1993    left     HX CATARACT REMOVAL Right 2018    HX HIP REPLACEMENT Left 01/1999    HX HIP REPLACEMENT Right     Right x2    [place] ANESTH,KNEE AREA SURGERY  1966     No Known Allergies   Family History   Problem Relation Age of Onset    Lung Disease Mother     Heart Disease Father     Hypertension Sister     Hypertension Son     Hypertension Daughter     Hypertension Daughter       Current Outpatient Medications   Medication Sig    amiodarone (CORDARONE) 200 mg tablet Please take 400mg (2 pills) Twice a day for 1 week; then 200mg twice a day for one week; then 200mg daily    losartan (COZAAR) 50 mg tablet Take 1 Tablet by mouth daily.     metoprolol succinate (TOPROL-XL) 25 mg XL tablet Take 1 Tablet by mouth nightly.  rivaroxaban (Xarelto) 20 mg tab tablet Take 1 Tablet by mouth daily.  ascorbic acid, vitamin C, (Vitamin C) 500 mg tablet Take  by mouth.  hydroCHLOROthiazide (HYDRODIURIL) 25 mg tablet Take 1 Tablet by mouth every other day.  OTHER Shaklee    b complex vitamins tablet Take 1 Tab by mouth daily.  Allergy Relief, cetirizine, 10 mg tablet Take 1 tablet by mouth nightly    zinc 50 mg tab tablet Take 50 mg by mouth daily.  mupirocin (BACTROBAN) 2 % ointment Apply  to affected area daily.  triamcinolone acetonide (KENALOG) 0.1 % topical cream Apply  to affected area two (2) times daily as needed for Skin Irritation. use thin layer     No current facility-administered medications for this encounter. Review of Symptoms:  Pertinent items are noted in HPI. Physical Exam    Visit Vitals  BP (!) 146/95 (BP 1 Location: Left upper arm, BP Patient Position: At rest)   Pulse 82   Temp 98.5 °F (36.9 °C)   Resp 19   Ht 5' 7\" (1.702 m)   Wt 175 lb (79.4 kg)   SpO2 100%   BMI 27.41 kg/m²     Neck: no JVD  Heart: irregularly irregular rhythm  Lungs: clear to auscultation bilaterally  Abdomen: soft, non-tender. Bowel sounds normal. No masses,  no organomegaly  Extremities: no edema    Cardiographics    Telemetry: AFIB    12/09/21    ECHO ADULT COMPLETE 12/13/2021 12/13/2021    Interpretation Summary  · LV: Estimated LVEF is 30 - 35%. Visually measured ejection fraction. Normal cavity size. Mild concentric hypertrophy. Moderately and globally reduced systolic function. · LA: Severely dilated left atrium. · AV: Mild aortic valve regurgitation is present. · MV: Mitral valve thickening. Mild mitral valve regurgitation is present. · TV: Mild tricuspid valve regurgitation is present. Right Ventricular Arterial Pressure (RVSP) is 26 mmHg. Pulmonary hypertension not suggested by Doppler findings.     Signed by: Sky Mai MD on 12/13/2021  2:37 PM      Labs: No results found for this or any previous visit (from the past 24 hour(s)).

## 2022-02-01 NOTE — TELEPHONE ENCOUNTER
TC from [address] for clarification of amiodarone RX. Resent for 100mg daily per VO from Dr. Ann-Marie Roldan. No further questions.

## 2022-02-01 NOTE — ANESTHESIA POSTPROCEDURE EVALUATION
Post-Anesthesia Evaluation and Assessment    Patient: Ramsaymalgorzata Patel MRN: [id number]  SSN: [id number]    YOB: 1950  Age: 66 y.o. Sex: male      I have evaluated the patient and they are stable and ready for discharge from the PACU. Cardiovascular Function/Vital Signs  Visit Vitals  BP 99/65 (BP 1 Location: Left upper arm, BP Patient Position: At rest)   Pulse (!) 50   Temp 36.9 °C (98.5 °F)   Resp 18   Ht 5' 7\" (1.702 m)   Wt 79.4 kg (175 lb)   SpO2 99%   BMI 27.41 kg/m²       Patient is status post MAC for JOJO cardioversion    Nausea/Vomiting: None    Postoperative hydration reviewed and adequate. Pain:  Pain Scale 1: Numeric (0 - 10) (02/01/22 0826)  Pain Intensity 1: 0 (02/01/22 0826)   Managed    Neurological Status: At baseline    Mental Status, Level of Consciousness: Alert and  oriented to person, place, and time    Pulmonary Status:   O2 Device: None (Room air) (02/01/22 0930)   Adequate oxygenation and airway patent    Complications related to anesthesia: None    Post-anesthesia assessment completed.  No concerns    Signed By: Gautam Marc DO     February 1, 2022

## 2022-02-09 ENCOUNTER — OFFICE VISIT (OUTPATIENT)
Dept: CARDIOLOGY CLINIC | Age: 72
End: 2022-02-09
Payer: MEDICARE

## 2022-02-09 VITALS
WEIGHT: 175 LBS | HEIGHT: 67 IN | SYSTOLIC BLOOD PRESSURE: 122 MMHG | DIASTOLIC BLOOD PRESSURE: 86 MMHG | HEART RATE: 47 BPM | BODY MASS INDEX: 27.47 KG/M2 | OXYGEN SATURATION: 98 % | RESPIRATION RATE: 16 BRPM

## 2022-02-09 DIAGNOSIS — I48.91 ATRIAL FIBRILLATION, UNSPECIFIED TYPE (HCC): Primary | ICD-10-CM

## 2022-02-09 PROCEDURE — 3017F COLORECTAL CA SCREEN DOC REV: CPT | Performed by: SPECIALIST

## 2022-02-09 PROCEDURE — 1101F PT FALLS ASSESS-DOCD LE1/YR: CPT | Performed by: SPECIALIST

## 2022-02-09 PROCEDURE — G8427 DOCREV CUR MEDS BY ELIG CLIN: HCPCS | Performed by: SPECIALIST

## 2022-02-09 PROCEDURE — G8752 SYS BP LESS 140: HCPCS | Performed by: SPECIALIST

## 2022-02-09 PROCEDURE — G8536 NO DOC ELDER MAL SCRN: HCPCS | Performed by: SPECIALIST

## 2022-02-09 PROCEDURE — G8432 DEP SCR NOT DOC, RNG: HCPCS | Performed by: SPECIALIST

## 2022-02-09 PROCEDURE — 99214 OFFICE O/P EST MOD 30 MIN: CPT | Performed by: SPECIALIST

## 2022-02-09 PROCEDURE — G8419 CALC BMI OUT NRM PARAM NOF/U: HCPCS | Performed by: SPECIALIST

## 2022-02-09 PROCEDURE — G8754 DIAS BP LESS 90: HCPCS | Performed by: SPECIALIST

## 2022-02-09 PROCEDURE — 93000 ELECTROCARDIOGRAM COMPLETE: CPT | Performed by: SPECIALIST

## 2022-02-09 NOTE — PROGRESS NOTES
Visit Vitals  /86   Pulse (!) 47   Resp 16   Ht 5' 7\" (1.702 m)   Wt 175 lb (79.4 kg)   SpO2 98%   BMI 27.41 kg/m²

## 2022-02-09 NOTE — PROGRESS NOTES
[facility]                                                            OFFICE NOTE        Kade Rodriguez M.D.,SALO Izabella Casillas   1950  [id number]    Date/Time:  2/9/20229:59 AM            SUBJECTIVE:  The been having some episodes (3) of blurred vision associated with lightheadedness. No presyncopal syncopal episode no chest pain or shortness of breath reported. Assessment/Plan    1. Paroxysmal atrial fibrillation: He for now remains in normal sinus rhythm although his electrocardiogram today reveals sinus bradycardia rate of 47 minimal intraventricular conduction delay 1 PAC and otherwise normal QTc interval.    Now status post JOJO cardioversion in February 2022. To be noted the patient  Had a very low heart rate soon after the cardioversion approximately 30 bpm.  And also sleep apnea was noted. Unfortunately blurry vision is of concern. Clearly amiodarone can cause that but also the fact that he has lightheadedness and bradycardia could be possible reason for his blurred vision. Clearly amiodarone is more effective than Toprol for his atrial fibrillation. We had a long conversation with which of the 2 medication to stop. For now stop Toprol continue 100 mg of amiodarone. He will of course let me know if he does have blurred vision in which case amiodarone will be stopped and Toprol will be restarted. Unfortunately I suspect the amiodarone may be the main culprit but we will try first with the Toprol. He needs a sleep study he will be referred to a sleep physician for evaluation. Continue Xarelto with no interruption. Limited echo in 3 to 4 weeks. To be noted his ejection fraction had improved on JOJO approximately 40 to 45%. I suspect the cardiomyopathy mostly related to atrial fibrillation with rapid ventricular response.     2.  CAD: Abnormal calcium score in 2019. Nuclear stress test in December 2021 with no ischemia and ejection fraction at that time estimated 50%. 3.  Hyperlipidemia: Continue to be followed by primary care physician. 4.  Cardiomyopathy: Mild to moderate he seems to be clinically compensated unfortunately I will have to stop the Toprol on account of bradycardia. Continue losartan hydrochlorothiazide every other day. To be noted there is TSH was normal.    He will need liver function test and TSH every 6 months eye examination and chest x-ray every year. Otherwise see him back in 3 to 4 weeks with limited echo the same day. The patient to call us if blurred vision continued despite stopping Toprol. HPI   66 y. o. male. Patient with h/o HTN seen here for evaluation of HTN and bradycardia  ECHO on 4/17:Left ventricle: Systolic function was normal. Ejection fraction was  estimated in the range of 55 % to 60 %. There were no regional wall motion  abnormalities. Mitral valve: There was mild regurgitation. Aortic valve: Normal valve structure. The valve was trileaflet. Leaflets  exhibited mildly increased thickness, normal cuspal separation, and  sclerosis. Tricuspid valve: There was mild regurgitation. HOLTER on 5/9/17: sinus bradycardia  with average of 58 frequent pacs and rare pvcs, 8 episodes of possible PAT with the longest of 31 beats at 165     Ca score on 6/19:Left main coronary artery: 0   Left anterior descending coronary artery: 170  Left circumflex coronary artery: 0  Right coronary artery: 33     Total calcium score: 203      NORMAL STRESS ECHO on 7/19 but htn response              CARDIAC STUDIES        02/01/22    ECHO JOJO W POSSIBLE CARDIOVERSION 02/01/2022 2/1/2022    Interpretation Summary    Left Ventricle: Left ventricle size is normal. Normal wall thickness. Mild global hypokinesis present. Mildly reduced left ventricular systolic function with a visually estimated EF of 40 - 45%.     Mitral Valve: Mildly thickened leaflets. Mild leaflet prolapse. Mild to moderate transvalvular regurgitation.   Left Atrium: Left atrium is mildly dilated. Normal sized windsock appendage. No left atrial appendage thrombus noted.   Right Atrium: Right atrium is moderately dilated. Signed by: Carla Carrillo MD on 2/1/2022  2:07 PM            12/09/21    NUCLEAR CARDIAC STRESS TEST 12/13/2021 12/14/2021    Interpretation Summary  · ECG: Resting ECG demonstrates atrial fibrillation. Signed by: Carla Carrillo MD on 12/13/2021  2:46 PM                    EKG Results     Procedure 720 Value Units Date/Time    AMB POC EKG ROUTINE W/ 12 LEADS, INTER & REP [[id number]]     Order Status: No result               IMAGING      MRI Results (most recent):  No results found for this or any previous visit. CT Results (most recent):  Results from Hospital Encounter encounter on 06/10/19    CT HEART W/O CONT WITH CALCIUM    Narrative  EXAM:  CT HEART W/O CONT WITH CALCIUM    INDICATION:  Preventive. Screening. COMPARISON: None. TECHNIQUE: CT of the heart was performed without contrast. Quantitative coronary  artery CT calcium scoring was performed. CT dose reduction was achieved through  use of a standardized protocol tailored for this examination and automatic  exposure control for dose modulation. Adaptive statistical iterative  reconstruction (ASIR) was utilized. FINDINGS:  The coronary calcium score in each vessel is as follows:    Left main coronary artery: 0  Left anterior descending coronary artery: 170  Left circumflex coronary artery: 0  Right coronary artery: 33    Total calcium score: 203    Calcium score interpretation:  0-0 = No evidence of coronary artery disease (CAD). 1-10 = Minimal evidence of CAD   = Mild evidence of CAD  101-400 = Moderate evidence of CAD  >400 = Extensive evidence of CAD    Your score of 203 places you in the 30 percentile rank.  That means that 70% of  men from 67-66 years old will have a higher calcium score than you. Chest CT findings: The visualized lungs are clear. The entire lung fields are  not imaged on this examination. Impression  IMPRESSION: Total calcium score of 203. Moderate coronary artery disease. XR Results (most recent):  Results from Hospital Encounter encounter on 03/01/18    XR CHEST PA LAT    Narrative  Chest PA and lateral    History: Cough and fever. Question left lower lobe pneumonia. Comparison: None    Findings: The lungs are well expanded. No focal consolidation, pleural  effusion, or pneumothorax. The cardiomediastinal silhouette is unremarkable. Mild spondylosis is seen in the midthoracic spine. Impression  Impression:  No acute cardiopulmonary process. Past Medical History:   Diagnosis Date    JINA (generalized anxiety disorder) 12/9/2019    Hypertension     OA (osteoarthritis) 8/19/2011     Past Surgical History:   Procedure Laterality Date    HX CATARACT REMOVAL Left 1993    left     HX CATARACT REMOVAL Right 2018    HX HIP REPLACEMENT Left 01/1999    HX HIP REPLACEMENT Right     Right x2    [place] ANESTH,KNEE AREA SURGERY  1966     Social History     Tobacco Use    Smoking status: Never Smoker    Smokeless tobacco: Never Used   Vaping Use    Vaping Use: Never used   Substance Use Topics    Alcohol use: Yes     Alcohol/week: 1.7 standard drinks     Types: 2 Standard drinks or equivalent per week     Comment: socially     Drug use: No     Family History   Problem Relation Age of Onset    Lung Disease Mother     Heart Disease Father     Hypertension Sister     Hypertension Son     Hypertension Daughter     Hypertension Daughter      No Known Allergies      There were no vitals taken for this visit. There were no vitals filed for this visit. Review of Systems:   Pertinent items are noted in the History of Present Illness.        Visit Vitals  /86   Pulse (!) 47   Resp 16   Ht 5' 7\" (1.702 m)   Wt 175 lb (79.4 kg)   SpO2 98%   BMI 27.41 kg/m²     General Appearance:  Well developed, well nourished,alert and oriented x 3, and individual in no acute distress. Ears/Nose/Mouth/Throat:   Hearing grossly normal.         Neck: Supple. Chest:   Lungs clear to auscultation bilaterally. Cardiovascular:  Regular rate and rhythm, S1, S2 normal, no murmur. Abdomen:   Soft, non-tender, bowel sounds are active. Extremities: No edema bilaterally. Skin: Warm and dry. Current Outpatient Medications on File Prior to Visit   Medication Sig Dispense Refill    metoprolol succinate (TOPROL-XL) 25 mg XL tablet Take 0.5 Tablets by mouth nightly. 180 Tablet 1    amiodarone (PACERONE) 100 mg tablet Take 1 Tablet by mouth daily. 90 Tablet 0    losartan (COZAAR) 50 mg tablet Take 1 Tablet by mouth daily. 90 Tablet 1    Allergy Relief, cetirizine, 10 mg tablet Take 1 tablet by mouth nightly 90 Tablet 0    rivaroxaban (Xarelto) 20 mg tab tablet Take 1 Tablet by mouth daily. 28 Tablet 0    ascorbic acid, vitamin C, (Vitamin C) 500 mg tablet Take  by mouth.  zinc 50 mg tab tablet Take 50 mg by mouth daily.  hydroCHLOROthiazide (HYDRODIURIL) 25 mg tablet Take 1 Tablet by mouth every other day. 90 Tablet 3    mupirocin (BACTROBAN) 2 % ointment Apply  to affected area daily.  OTHER Shaklee      b complex vitamins tablet Take 1 Tab by mouth daily.  triamcinolone acetonide (KENALOG) 0.1 % topical cream Apply  to affected area two (2) times daily as needed for Skin Irritation. use thin layer 80 g 1     No current facility-administered medications on file prior to visit. Shanna Meraz had no medications administered during this visit. Current Outpatient Medications   Medication Sig    metoprolol succinate (TOPROL-XL) 25 mg XL tablet Take 0.5 Tablets by mouth nightly.  amiodarone (PACERONE) 100 mg tablet Take 1 Tablet by mouth daily.     losartan (COZAAR) 50 mg tablet Take 1 Tablet by mouth daily.  Allergy Relief, cetirizine, 10 mg tablet Take 1 tablet by mouth nightly    rivaroxaban (Xarelto) 20 mg tab tablet Take 1 Tablet by mouth daily.  ascorbic acid, vitamin C, (Vitamin C) 500 mg tablet Take  by mouth.  zinc 50 mg tab tablet Take 50 mg by mouth daily.  hydroCHLOROthiazide (HYDRODIURIL) 25 mg tablet Take 1 Tablet by mouth every other day.  mupirocin (BACTROBAN) 2 % ointment Apply  to affected area daily.  OTHER Shaklee    b complex vitamins tablet Take 1 Tab by mouth daily.  triamcinolone acetonide (KENALOG) 0.1 % topical cream Apply  to affected area two (2) times daily as needed for Skin Irritation. use thin layer     No current facility-administered medications for this visit. Lab Results   Component Value Date/Time    Cholesterol, total 166 11/15/2021 12:07 PM    HDL Cholesterol 66 11/15/2021 12:07 PM    LDL, calculated 83.8 11/15/2021 12:07 PM    VLDL, calculated 16.2 11/15/2021 12:07 PM    Triglyceride 81 11/15/2021 12:07 PM    CHOL/HDL Ratio 2.5 11/15/2021 12:07 PM       Lab Results   Component Value Date/Time    Sodium 141 01/28/2022 09:58 AM    Potassium 4.7 01/28/2022 09:58 AM    Chloride 107 01/28/2022 09:58 AM    CO2 31 01/28/2022 09:58 AM    Anion gap 3 (L) 01/28/2022 09:58 AM    Glucose 78 01/28/2022 09:58 AM    BUN 23 (H) 01/28/2022 09:58 AM    Creatinine 1.08 01/28/2022 09:58 AM    BUN/Creatinine ratio 21 (H) 01/28/2022 09:58 AM    GFR est AA >60 01/28/2022 09:58 AM    GFR est non-AA >60 01/28/2022 09:58 AM    Calcium 9.3 01/28/2022 09:58 AM       Lab Results   Component Value Date/Time    ALT (SGPT) 37 12/29/2021 11:25 AM    Alk.  phosphatase 55 12/29/2021 11:25 AM    Bilirubin, total 0.6 12/29/2021 11:25 AM       Lab Results   Component Value Date/Time    WBC 8.9 01/28/2022 09:58 AM    HGB 14.1 01/28/2022 09:58 AM    HCT 44.3 01/28/2022 09:58 AM    PLATELET 745 41/44/9686 09:58 AM    MCV 87.2 01/28/2022 09:58 AM       Lab Results Component Value Date/Time    TSH 2.84 01/28/2022 09:58 AM         Lab Results   Component Value Date/Time    Cholesterol, total 166 11/15/2021 12:07 PM    Cholesterol, total 170 11/11/2020 03:24 PM    Cholesterol, total 168 11/06/2019 10:51 AM    Cholesterol, total 173 11/19/2018 09:03 AM    Cholesterol, total 181 11/13/2017 10:34 AM    HDL Cholesterol 66 11/15/2021 12:07 PM    HDL Cholesterol 65 11/11/2020 03:24 PM    HDL Cholesterol 63 11/06/2019 10:51 AM    HDL Cholesterol 63 11/19/2018 09:03 AM    HDL Cholesterol 64 11/13/2017 10:34 AM    LDL, calculated 83.8 11/15/2021 12:07 PM    LDL, calculated 93.4 11/11/2020 03:24 PM    LDL, calculated 94 11/06/2019 10:51 AM    LDL, calculated 96 11/19/2018 09:03 AM    LDL, calculated 106 (H) 11/13/2017 10:34 AM    Triglyceride 81 11/15/2021 12:07 PM    Triglyceride 58 11/11/2020 03:24 PM    Triglyceride 56 11/06/2019 10:51 AM    Triglyceride 70 11/19/2018 09:03 AM    Triglyceride 53 11/13/2017 10:34 AM    CHOL/HDL Ratio 2.5 11/15/2021 12:07 PM    CHOL/HDL Ratio 2.6 11/11/2020 03:24 PM    CHOL/HDL Ratio 2.9 07/21/2010 11:05 AM    CHOL/HDL Ratio 3.5 07/17/2009 09:58 AM                Please note that this dictation was completed with [employer], the [employer] voice recognition software. Quite often unanticipated grammatical, syntax, homophones, and other interpretative errors are inadvertently transcribed by the computer software. Please disregard these errors. Please excuse any errors that have escaped final proofreading.

## 2022-02-14 ENCOUNTER — PATIENT MESSAGE (OUTPATIENT)
Dept: CARDIOLOGY CLINIC | Age: 72
End: 2022-02-14

## 2022-02-18 NOTE — TELEPHONE ENCOUNTER
Ginny Wheatley MD  You 5 minutes ago (1:51 PM)     MG    If he feels fine bp ok    Message text      Milvia Swan MD 4 days ago     ES    Please advise    Routing comment

## 2022-03-09 ENCOUNTER — OFFICE VISIT (OUTPATIENT)
Dept: CARDIOLOGY CLINIC | Age: 72
End: 2022-03-09

## 2022-03-09 ENCOUNTER — ANCILLARY PROCEDURE (OUTPATIENT)
Dept: CARDIOLOGY CLINIC | Age: 72
End: 2022-03-09
Payer: MEDICARE

## 2022-03-09 VITALS
WEIGHT: 175 LBS | HEIGHT: 67 IN | DIASTOLIC BLOOD PRESSURE: 86 MMHG | SYSTOLIC BLOOD PRESSURE: 122 MMHG | BODY MASS INDEX: 27.47 KG/M2

## 2022-03-09 VITALS
WEIGHT: 180 LBS | OXYGEN SATURATION: 98 % | SYSTOLIC BLOOD PRESSURE: 140 MMHG | HEIGHT: 67 IN | BODY MASS INDEX: 28.25 KG/M2 | HEART RATE: 98 BPM | DIASTOLIC BLOOD PRESSURE: 84 MMHG | RESPIRATION RATE: 16 BRPM

## 2022-03-09 DIAGNOSIS — Z79.899 ON AMIODARONE THERAPY: ICD-10-CM

## 2022-03-09 DIAGNOSIS — I48.91 ATRIAL FIBRILLATION, UNSPECIFIED TYPE (HCC): Primary | ICD-10-CM

## 2022-03-09 LAB
ECHO AO ASC DIAM: 4 CM
ECHO AO ASCENDING AORTA INDEX: 2.09 CM/M2
ECHO AO ROOT DIAM: 3.8 CM
ECHO AO ROOT INDEX: 1.99 CM/M2
ECHO LA DIAMETER INDEX: 2.3 CM/M2
ECHO LA DIAMETER: 4.4 CM
ECHO LA TO AORTIC ROOT RATIO: 1.16
ECHO LA VOL 2C: 89 ML (ref 18–58)
ECHO LA VOL 4C: 71 ML (ref 18–58)
ECHO LA VOL BP: 81 ML (ref 18–58)
ECHO LA VOL/BSA BIPLANE: 42 ML/M2 (ref 16–34)
ECHO LA VOLUME AREA LENGTH: 85 ML
ECHO LA VOLUME INDEX A2C: 47 ML/M2 (ref 16–34)
ECHO LA VOLUME INDEX A4C: 37 ML/M2 (ref 16–34)
ECHO LA VOLUME INDEX AREA LENGTH: 45 ML/M2 (ref 16–34)
ECHO LV EDV A2C: 62 ML
ECHO LV EDV A4C: 86 ML
ECHO LV EDV BP: 74 ML (ref 67–155)
ECHO LV EDV INDEX A4C: 45 ML/M2
ECHO LV EDV INDEX BP: 39 ML/M2
ECHO LV EDV NDEX A2C: 32 ML/M2
ECHO LV EJECTION FRACTION A2C: 54 %
ECHO LV EJECTION FRACTION A4C: 58 %
ECHO LV EJECTION FRACTION BIPLANE: 57 % (ref 55–100)
ECHO LV ESV A2C: 28 ML
ECHO LV ESV A4C: 36 ML
ECHO LV ESV BP: 32 ML (ref 22–58)
ECHO LV ESV INDEX A2C: 15 ML/M2
ECHO LV ESV INDEX A4C: 19 ML/M2
ECHO LV ESV INDEX BP: 17 ML/M2
ECHO LV FRACTIONAL SHORTENING: 33 % (ref 28–44)
ECHO LV INTERNAL DIMENSION DIASTOLE INDEX: 2.57 CM/M2
ECHO LV INTERNAL DIMENSION DIASTOLIC: 4.9 CM (ref 4.2–5.9)
ECHO LV INTERNAL DIMENSION SYSTOLIC INDEX: 1.73 CM/M2
ECHO LV INTERNAL DIMENSION SYSTOLIC: 3.3 CM
ECHO LV IVSD: 1 CM (ref 0.6–1)
ECHO LV MASS 2D: 164.3 G (ref 88–224)
ECHO LV MASS INDEX 2D: 86 G/M2 (ref 49–115)
ECHO LV POSTERIOR WALL DIASTOLIC: 0.9 CM (ref 0.6–1)
ECHO LV RELATIVE WALL THICKNESS RATIO: 0.37

## 2022-03-09 PROCEDURE — G8536 NO DOC ELDER MAL SCRN: HCPCS | Performed by: SPECIALIST

## 2022-03-09 PROCEDURE — G8754 DIAS BP LESS 90: HCPCS | Performed by: SPECIALIST

## 2022-03-09 PROCEDURE — G8427 DOCREV CUR MEDS BY ELIG CLIN: HCPCS | Performed by: SPECIALIST

## 2022-03-09 PROCEDURE — 3017F COLORECTAL CA SCREEN DOC REV: CPT | Performed by: SPECIALIST

## 2022-03-09 PROCEDURE — G8419 CALC BMI OUT NRM PARAM NOF/U: HCPCS | Performed by: SPECIALIST

## 2022-03-09 PROCEDURE — 99214 OFFICE O/P EST MOD 30 MIN: CPT | Performed by: SPECIALIST

## 2022-03-09 PROCEDURE — 1101F PT FALLS ASSESS-DOCD LE1/YR: CPT | Performed by: SPECIALIST

## 2022-03-09 PROCEDURE — 93308 TTE F-UP OR LMTD: CPT | Performed by: SPECIALIST

## 2022-03-09 PROCEDURE — G8432 DEP SCR NOT DOC, RNG: HCPCS | Performed by: SPECIALIST

## 2022-03-09 PROCEDURE — G8752 SYS BP LESS 140: HCPCS | Performed by: SPECIALIST

## 2022-03-09 RX ORDER — AMIODARONE HYDROCHLORIDE 100 MG/1
100 TABLET ORAL DAILY
Qty: 90 TABLET | Refills: 1 | Status: SHIPPED | OUTPATIENT
Start: 2022-03-09 | End: 2022-03-14 | Stop reason: SDUPTHER

## 2022-03-09 NOTE — PROGRESS NOTES
Visit Vitals  BP (!) 140/84   Pulse 98   Resp 16   Ht 5' 7\" (1.702 m)   Wt 180 lb (81.6 kg)   SpO2 98%   BMI 28.19 kg/m²

## 2022-03-09 NOTE — PROGRESS NOTES
[address] [facility]                                                            OFFICE NOTE        Kurt Porter M.D.,SALO ELLEN ARMIJO   1950  [id number]    Date/Time:  3/9/036275:35 PM            SUBJECTIVE:  He is feeling well. His blurry vision is completely resolved. No chest pain palpitation presyncopal syncopal episode dizziness reported. No excessive fatigue. Assessment/Plan    1. Paroxysmal atrial fibrillation: He remains in normal sinus rhythm with physical examination today. Status post JOJO cardioversion 5/8/2022 with restoration of normal sinus rhythm. To be noted the patient did have a very slow heart rate approximately 30 bpm soon after cardioversion. Continue with half of the dose of amiodarone. The patient is now off metoprolol completely. Continue with Xarelto no untoward effects thus far. Proceed with sleep apnea as planned. Discussed the potential side effects of amiodarone. Proceed with liver function test and TSH and chest x-ray in June 2022. We have discussed the potential for underlying conduction disease and the potential need for pacemaker in the future. He will let me know if any symptoms. 2.  Cardiomyopathy: I suspect this was related to is atrial fibrillation rapid ventricular response. Echocardiogram today with normal ejection fraction and this was been discussed with the patient. Now off metoprolol and only on losartan this will be continued. 3.  CAD: Previous abnormal calcium score. Nuclear stress test in December 2021 essentially with no ischemia. 4.  Hypertension: At the upper limits of normal he will keep an eye on it at home and let me know if it consistently above 140.    5. Hyperlipidemia: Closely followed by his primary care physician.     Otherwise see him back in June 2022 after the above-mentioned test to be completed. The patient is also aware that does require a eye examination on a yearly basis. He tells me we will do that next week            HPI   71 y. o. male. Patient with h/o HTN seen here for evaluation of HTN and bradycardia  ECHO on 4/17:Left ventricle: Systolic function was normal. Ejection fraction was  estimated in the range of 55 % to 60 %. There were no regional wall motion  abnormalities. Mitral valve: There was mild regurgitation. Aortic valve: Normal valve structure. The valve was trileaflet. Leaflets  exhibited mildly increased thickness, normal cuspal separation, and  sclerosis. Tricuspid valve: There was mild regurgitation. HOLTER on 5/9/17: sinus bradycardia  with average of 58 frequent pacs and rare pvcs, 8 episodes of possible PAT with the longest of 31 beats at 165     Ca score on 6/19:Left main coronary artery: 0   Left anterior descending coronary artery: 170  Left circumflex coronary artery: 0  Right coronary artery: 33     Total calcium score: 203      NORMAL STRESS ECHO on 7/19 but htn response              CARDIAC STUDIES        02/09/22    ECHO ADULT FOLLOW-UP OR LIMITED 03/09/2022 3/9/2022    Interpretation Summary    Left Ventricle: Left ventricle size is normal. Normal wall thickness. Normal wall motion. Normal left ventricular systolic function with a visually estimated EF of 55 - 60%.   Left Atrium: Left atrium is moderately dilated.   Right Atrium: Right atrium is moderately dilated.   Aorta: Mildly dilated aortic root. Mildly dilated ascending aorta. Signed by: Marquis Dupont MD on 3/9/2022 12:32 PM            12/09/21    NUCLEAR CARDIAC STRESS TEST 12/13/2021 12/14/2021    Interpretation Summary  · ECG: Resting ECG demonstrates atrial fibrillation. Signed by: Marquis Dupont MD on 12/13/2021  2:46 PM                    EKG Results     None              IMAGING      MRI Results (most recent):  No results found for this or any previous visit.       CT Results (most recent):  Results from [place] encounter on 06/10/19    CT HEART W/O CONT WITH CALCIUM    Narrative  EXAM:  CT HEART W/O CONT WITH CALCIUM    INDICATION:  Preventive. Screening. COMPARISON: None. TECHNIQUE: CT of the heart was performed without contrast. Quantitative coronary  artery CT calcium scoring was performed. CT dose reduction was achieved through  use of a standardized protocol tailored for this examination and automatic  exposure control for dose modulation. Adaptive statistical iterative  reconstruction (ASIR) was utilized. FINDINGS:  The coronary calcium score in each vessel is as follows:    Left main coronary artery: 0  Left anterior descending coronary artery: 170  Left circumflex coronary artery: 0  Right coronary artery: 33    Total calcium score: 203    Calcium score interpretation:  0-0 = No evidence of coronary artery disease (CAD). 1-10 = Minimal evidence of CAD   = Mild evidence of CAD  101-400 = Moderate evidence of CAD  >400 = Extensive evidence of CAD    Your score of 203 places you in the 30 percentile rank. That means that 70% of  men from 67-66 years old will have a higher calcium score than you. Chest CT findings: The visualized lungs are clear. The entire lung fields are  not imaged on this examination. Impression  IMPRESSION: Total calcium score of 203. Moderate coronary artery disease. XR Results (most recent):  Results from Hospital Encounter encounter on 03/01/18    XR CHEST PA LAT    Narrative  Chest PA and lateral    History: Cough and fever. Question left lower lobe pneumonia. Comparison: None    Findings: The lungs are well expanded. No focal consolidation, pleural  effusion, or pneumothorax. The cardiomediastinal silhouette is unremarkable. Mild spondylosis is seen in the midthoracic spine. Impression  Impression:  No acute cardiopulmonary process.           Past Medical History:   Diagnosis Date    JINA (generalized anxiety disorder) 12/9/2019    Hypertension     OA (osteoarthritis) 8/19/2011     Past Surgical History:   Procedure Laterality Date    HX CATARACT REMOVAL Left 1993    left     HX CATARACT REMOVAL Right 2018    HX HIP REPLACEMENT Left 01/1999    HX HIP REPLACEMENT Right     Right x2    CO ANESTH,KNEE AREA SURGERY  1966     Social History     Tobacco Use    Smoking status: Never Smoker    Smokeless tobacco: Never Used   Vaping Use    Vaping Use: Never used   Substance Use Topics    Alcohol use: Yes     Alcohol/week: 1.7 standard drinks     Types: 2 Standard drinks or equivalent per week     Comment: socially     Drug use: No     Family History   Problem Relation Age of Onset    Lung Disease Mother     Heart Disease Father     Hypertension Sister     Hypertension Son     Hypertension Daughter     Hypertension Daughter      No Known Allergies      Visit Vitals  BP (!) 140/84   Pulse 98   Resp 16   Ht 5' 7\" (1.702 m)   Wt 180 lb (81.6 kg)   SpO2 98%   BMI 28.19 kg/m²         Last 3 Recorded Weights in this Encounter    03/09/22 1141   Weight: 180 lb (81.6 kg)            Review of Systems:   Pertinent items are noted in the History of Present Illness. Visit Vitals  BP (!) 140/84   Pulse 98   Resp 16   Ht 5' 7\" (1.702 m)   Wt 180 lb (81.6 kg)   SpO2 98%   BMI 28.19 kg/m²     General Appearance:  Well developed, well nourished,alert and oriented x 3, and individual in no acute distress. Ears/Nose/Mouth/Throat:   Hearing grossly normal.         Neck: Supple. Chest:   Lungs clear to auscultation bilaterally. Cardiovascular:  Regular rate and rhythm, S1, S2 normal, no murmur. Abdomen:   Soft, non-tender, bowel sounds are active. Extremities: No edema bilaterally. Skin: Warm and dry. Current Outpatient Medications on File Prior to Visit   Medication Sig Dispense Refill    amiodarone (PACERONE) 100 mg tablet Take 1 Tablet by mouth daily.  90 Tablet 0    losartan (COZAAR) 50 mg tablet Take 1 Tablet by mouth daily. 90 Tablet 1    Allergy Relief, cetirizine, 10 mg tablet Take 1 tablet by mouth nightly 90 Tablet 0    rivaroxaban (Xarelto) 20 mg tab tablet Take 1 Tablet by mouth daily. 28 Tablet 0    ascorbic acid, vitamin C, (Vitamin C) 500 mg tablet Take  by mouth.  zinc 50 mg tab tablet Take 50 mg by mouth daily.  hydroCHLOROthiazide (HYDRODIURIL) 25 mg tablet Take 1 Tablet by mouth every other day. 90 Tablet 3    mupirocin (BACTROBAN) 2 % ointment Apply  to affected area daily.  OTHER Shaklee      b complex vitamins tablet Take 1 Tab by mouth daily.  triamcinolone acetonide (KENALOG) 0.1 % topical cream Apply  to affected area two (2) times daily as needed for Skin Irritation. use thin layer 80 g 1     No current facility-administered medications on file prior to visit. Katheryn Thomas had no medications administered during this visit. Current Outpatient Medications   Medication Sig    amiodarone (PACERONE) 100 mg tablet Take 1 Tablet by mouth daily.  losartan (COZAAR) 50 mg tablet Take 1 Tablet by mouth daily.  Allergy Relief, cetirizine, 10 mg tablet Take 1 tablet by mouth nightly    rivaroxaban (Xarelto) 20 mg tab tablet Take 1 Tablet by mouth daily.  ascorbic acid, vitamin C, (Vitamin C) 500 mg tablet Take  by mouth.  zinc 50 mg tab tablet Take 50 mg by mouth daily.  hydroCHLOROthiazide (HYDRODIURIL) 25 mg tablet Take 1 Tablet by mouth every other day.  mupirocin (BACTROBAN) 2 % ointment Apply  to affected area daily.  OTHER Shaklee    b complex vitamins tablet Take 1 Tab by mouth daily.  triamcinolone acetonide (KENALOG) 0.1 % topical cream Apply  to affected area two (2) times daily as needed for Skin Irritation. use thin layer     No current facility-administered medications for this visit.          Lab Results   Component Value Date/Time    Cholesterol, total 166 11/15/2021 12:07 PM    HDL Cholesterol 66 11/15/2021 12:07 PM    LDL, calculated 83.8 11/15/2021 12:07 PM    VLDL, calculated 16.2 11/15/2021 12:07 PM    Triglyceride 81 11/15/2021 12:07 PM    CHOL/HDL Ratio 2.5 11/15/2021 12:07 PM       Lab Results   Component Value Date/Time    Sodium 141 01/28/2022 09:58 AM    Potassium 4.7 01/28/2022 09:58 AM    Chloride 107 01/28/2022 09:58 AM    CO2 31 01/28/2022 09:58 AM    Anion gap 3 (L) 01/28/2022 09:58 AM    Glucose 78 01/28/2022 09:58 AM    BUN 23 (H) 01/28/2022 09:58 AM    Creatinine 1.08 01/28/2022 09:58 AM    BUN/Creatinine ratio 21 (H) 01/28/2022 09:58 AM    GFR est AA >60 01/28/2022 09:58 AM    GFR est non-AA >60 01/28/2022 09:58 AM    Calcium 9.3 01/28/2022 09:58 AM       Lab Results   Component Value Date/Time    ALT (SGPT) 37 12/29/2021 11:25 AM    Alk.  phosphatase 55 12/29/2021 11:25 AM    Bilirubin, total 0.6 12/29/2021 11:25 AM       Lab Results   Component Value Date/Time    WBC 8.9 01/28/2022 09:58 AM    HGB 14.1 01/28/2022 09:58 AM    HCT 44.3 01/28/2022 09:58 AM    PLATELET 935 82/94/1113 09:58 AM    MCV 87.2 01/28/2022 09:58 AM       Lab Results   Component Value Date/Time    TSH 2.84 01/28/2022 09:58 AM         Lab Results   Component Value Date/Time    Cholesterol, total 166 11/15/2021 12:07 PM    Cholesterol, total 170 11/11/2020 03:24 PM    Cholesterol, total 168 11/06/2019 10:51 AM    Cholesterol, total 173 11/19/2018 09:03 AM    Cholesterol, total 181 11/13/2017 10:34 AM    HDL Cholesterol 66 11/15/2021 12:07 PM    HDL Cholesterol 65 11/11/2020 03:24 PM    HDL Cholesterol 63 11/06/2019 10:51 AM    HDL Cholesterol 63 11/19/2018 09:03 AM    HDL Cholesterol 64 11/13/2017 10:34 AM    LDL, calculated 83.8 11/15/2021 12:07 PM    LDL, calculated 93.4 11/11/2020 03:24 PM    LDL, calculated 94 11/06/2019 10:51 AM    LDL, calculated 96 11/19/2018 09:03 AM    LDL, calculated 106 (H) 11/13/2017 10:34 AM    Triglyceride 81 11/15/2021 12:07 PM    Triglyceride 58 11/11/2020 03:24 PM    Triglyceride 56 11/06/2019 10:51 AM    Triglyceride 70 11/19/2018 09:03 AM    Triglyceride 53 11/13/2017 10:34 AM    CHOL/HDL Ratio 2.5 11/15/2021 12:07 PM    CHOL/HDL Ratio 2.6 11/11/2020 03:24 PM    CHOL/HDL Ratio 2.9 07/21/2010 11:05 AM    CHOL/HDL Ratio 3.5 07/17/2009 09:58 AM                Please note that this dictation was completed with [employer], the computer voice recognition software. Quite often unanticipated grammatical, syntax, homophones, and other interpretative errors are inadvertently transcribed by the computer software. Please disregard these errors. Please excuse any errors that have escaped final proofreading.

## 2022-03-14 RX ORDER — AMIODARONE HYDROCHLORIDE 100 MG/1
100 TABLET ORAL DAILY
Qty: 90 TABLET | Refills: 1 | Status: SHIPPED | OUTPATIENT
Start: 2022-03-14 | End: 2022-10-04

## 2022-03-14 NOTE — TELEPHONE ENCOUNTER
Patient would like a refill on amiodarone 100mg and would like only this medication sent to [facility] [phone]

## 2022-03-14 NOTE — TELEPHONE ENCOUNTER
Cardiologist: Dr. Tayla Mistry    Last appt: 3/9/2022  Future Appointments   Date Time Provider Tamiko Ding   3/22/2022  9:00 AM Almaz Nicholson MD [facility] BS AMB   6/20/2022  9:20 AM [facility]       Requested Prescriptions     Signed Prescriptions Disp Refills    amiodarone (PACERONE) 100 mg tablet 90 Tablet 1     Sig: Take 1 Tablet by mouth daily.      Authorizing Provider: Lu Huerta     Ordering User: ANNETTE STEPHENSON VO per Dr. Kayli Keen.

## 2022-03-18 PROBLEM — N52.8 OTHER MALE ERECTILE DYSFUNCTION: Status: ACTIVE | Noted: 2020-06-10

## 2022-03-18 PROBLEM — I47.1 ATRIAL TACHYCARDIA (HCC): Status: ACTIVE | Noted: 2018-11-19

## 2022-03-18 PROBLEM — I47.19 ATRIAL TACHYCARDIA: Status: ACTIVE | Noted: 2018-11-19

## 2022-03-19 PROBLEM — F41.1 GAD (GENERALIZED ANXIETY DISORDER): Status: ACTIVE | Noted: 2019-12-09

## 2022-03-22 ENCOUNTER — OFFICE VISIT (OUTPATIENT)
Dept: SLEEP MEDICINE | Age: 72
End: 2022-03-22
Payer: MEDICARE

## 2022-03-22 VITALS
WEIGHT: 180.1 LBS | OXYGEN SATURATION: 100 % | BODY MASS INDEX: 28.27 KG/M2 | HEART RATE: 51 BPM | DIASTOLIC BLOOD PRESSURE: 69 MMHG | HEIGHT: 67 IN | RESPIRATION RATE: 20 BRPM | SYSTOLIC BLOOD PRESSURE: 131 MMHG | TEMPERATURE: 97.2 F

## 2022-03-22 DIAGNOSIS — I48.0 PAROXYSMAL ATRIAL FIBRILLATION (HCC): ICD-10-CM

## 2022-03-22 DIAGNOSIS — G47.33 OBSTRUCTIVE SLEEP APNEA (ADULT) (PEDIATRIC): Primary | ICD-10-CM

## 2022-03-22 PROCEDURE — 1101F PT FALLS ASSESS-DOCD LE1/YR: CPT | Performed by: INTERNAL MEDICINE

## 2022-03-22 PROCEDURE — 99204 OFFICE O/P NEW MOD 45 MIN: CPT | Performed by: INTERNAL MEDICINE

## 2022-03-22 PROCEDURE — 3017F COLORECTAL CA SCREEN DOC REV: CPT | Performed by: INTERNAL MEDICINE

## 2022-03-22 PROCEDURE — G8536 NO DOC ELDER MAL SCRN: HCPCS | Performed by: INTERNAL MEDICINE

## 2022-03-22 PROCEDURE — G8752 SYS BP LESS 140: HCPCS | Performed by: INTERNAL MEDICINE

## 2022-03-22 PROCEDURE — G8432 DEP SCR NOT DOC, RNG: HCPCS | Performed by: INTERNAL MEDICINE

## 2022-03-22 PROCEDURE — G8427 DOCREV CUR MEDS BY ELIG CLIN: HCPCS | Performed by: INTERNAL MEDICINE

## 2022-03-22 PROCEDURE — G8419 CALC BMI OUT NRM PARAM NOF/U: HCPCS | Performed by: INTERNAL MEDICINE

## 2022-03-22 PROCEDURE — G8754 DIAS BP LESS 90: HCPCS | Performed by: INTERNAL MEDICINE

## 2022-03-22 NOTE — PROGRESS NOTES
[address] [facility] [place] [place], [address]  Tel.  [phone]  Fax. [address] [facility] [address]  [place], [address] [facility]  Tel.  [phone]  Fax. [phone] [address]  Tel.  [phone]  Fax. [phone]         Subjective:      Rosalva Hill is an 66 y.o. male referred for evaluation for a sleep disorder. He complains of snoring associated with excessive daytime sleepiness, and he was advised to be evaluated for sleep apnea because of recent diagnosis of atrial fibrillation. Symptoms began several years ago, unchanged since that time. He usually can fall asleep in 10-15 minutes. Family or house members note snoring, periods of not breathing. He denies falling asleep while driving. Rosalva Hill does wake up frequently at night. He is not bothered by waking up too early and left unable to get back to sleep. He actually sleeps about 6 hours at night and wakes up about 3 times during the night. He does not work shifts: Gabe Douglas Carvalho indicates he   get too little sleep at night. His bedtime is 2200. He awakens at 0530. He does take naps. He takes 2 naps a week lasting 1. He has the following observed behaviors: Loud snoring,Twitching of legs or feet,Pauses in breathing,Grinding teeth,Kicking with legs;  . Other remarks:    His wife sleeps in separate room now due to his snoring. He is busy with grandchildren. His kids live close by and he and his wife help with the kids and like to attend their activities/sports  [place] Sleepiness Score: 12   which reflect mild daytime drowsiness. No Known Allergies      Current Outpatient Medications:     amiodarone (PACERONE) 100 mg tablet, Take 1 Tablet by mouth daily. (Patient taking differently: Take 50 mg by mouth daily.), Disp: 90 Tablet, Rfl: 1    losartan (COZAAR) 50 mg tablet, Take 1 Tablet by mouth daily. , Disp: 90 Tablet, Rfl: 1    Allergy Relief, cetirizine, 10 mg tablet, Take 1 tablet by mouth nightly (Patient taking differently: Take 10 mg by mouth daily as needed.), Disp: 90 Tablet, Rfl: 0    rivaroxaban (Xarelto) 20 mg tab tablet, Take 1 Tablet by mouth daily. , Disp: 28 Tablet, Rfl: 0    ascorbic acid, vitamin C, (Vitamin C) 500 mg tablet, Take 500 mg by mouth daily. , Disp: , Rfl:     zinc 50 mg tab tablet, Take 50 mg by mouth daily. , Disp: , Rfl:     hydroCHLOROthiazide (HYDRODIURIL) 25 mg tablet, Take 1 Tablet by mouth every other day., Disp: 90 Tablet, Rfl: 3    OTHER, Shaklee, Disp: , Rfl:     b complex vitamins tablet, Take 2 Tablets by mouth daily. , Disp: , Rfl:      He  has a past medical history of Arrhythmia (11/2021), JINA (generalized anxiety disorder) (12/9/2019), Hypertension, and OA (osteoarthritis) (8/19/2011). He  has a past surgical history that includes pr anesth,knee area surgery (1966); hx hip replacement (Left, 01/1999); hx hip replacement (Right); hx cataract removal (Left, 1993); and hx cataract removal (Right, 2018). He family history includes Heart Disease in his father; Hypertension in his daughter, daughter, sister, and son; Lung Disease in his mother. He  reports that he has never smoked. He has never used smokeless tobacco. He reports current alcohol use of about 4.0 standard drinks of alcohol per week. He reports that he does not use drugs. Review of Systems:  Constitutional:  No significant weight loss or weight gain.   Eyes:  Some mild blurred vision related to scar tissue from cataract extractions  CVS:  No significant chest pain  Pulm:  No significant shortness of breath  GI:  No significant nausea or vomiting  :  No significant nocturia  Musculoskeletal:  some significant joint pain at night  Skin:  No significant rashes  Neuro:  No significant dizziness   Psych:  No active mood issues    Sleep Review of Systems: notable for no difficulty falling asleep; +frequent awakenings at night;  some dreaming noted; no nightmares ; no early morning headaches; no memory problems; no concentration issues; no history of any automobile or occupational accidents due to daytime drowsiness. Objective:     Visit Vitals  /69 (BP 1 Location: Right arm, BP Patient Position: Sitting, BP Cuff Size: Large adult)   Pulse (!) 51   Temp 97.2 °F (36.2 °C) (Temporal)   Resp 20   Ht 5' 7\" (1.702 m)   Wt 180 lb 1.6 oz (81.7 kg)   SpO2 100%   BMI 28.21 kg/m²         General:   Not in acute distress   Eyes:  Anicteric sclerae, no obvious strabismus   Nose:  No obvious nasal septum deviation    Oropharynx:   Class 3 oropharyngeal outlet, thick tongue base, enlarged and boggy uvula, low-lying soft palate, narrow tonsilo-pharyngeal pilars   Tonsils:   tonsils are present and normal   Neck:    ; midline trachea   Chest/Lungs:  Equal lung expansion, clear on auscultation    CVS:  Normal rate, regular rhythm; no JVD   Skin:  Warm to touch; no obvious rashes   Neuro:  No focal deficits ; no obvious tremor    Psych:  Normal affect,  normal countenance;          Assessment:       ICD-10-CM ICD-9-CM    1. Obstructive sleep apnea (adult) (pediatric)  G47.33 327.23 SLEEP STUDY UNATTENDED, 4 CHANNEL   2. Paroxysmal atrial fibrillation (HCC)  I48.0 427.31          Plan:     * The patient currently has a High Risk for having sleep apnea. STOP-BANG score 6.  * HSAT was ordered for initial evaluation. Treatment options for sleep apnea were reviewed. he is not against a trial of PAP if found to have significant sleep apnea. * He was provided information on sleep apnea including coresponding risk factors and the importance of proper treatment. * Counseling was provided regarding proper sleep hygiene and safe driving. I have counseled the patient regarding the benefits of weight loss. 2. . Atrial fibrillation - he continues on his current regimen.   I have reviewed the relationship between arrhythmias and sleep disordered breathing     The treatment plan was reviewed with the patient in detail . he understands that the lead technologist will be calling him  with the results or notifying of results via [address] [place] [address] and assisting with the next step in the treatment plan as outlined today during the consultation with me. All of his questions were addressed. Thank you for allowing us to participate in your patient's medical care. We'll keep you updated on these investigations.     Electronically signed by    John Gruber MD  Diplomate in Sleep Medicine  [facility]    3/22/2022

## 2022-03-22 NOTE — Clinical Note
Thank you for the referral.  I will keep you informed of his progress.   [address],  Brenda Mejía

## 2022-03-22 NOTE — PROGRESS NOTES
Patient came in today for a download with his sd card, download was not accessible remotely. Patient is using a [employer], technician attempted to use several sd cards to retreive the data. Technician unable to retreive the data via the sd card.   The following data was obtained from the patient's device for the past 30 days:  Days > 4 hours: 29/30  AHI: 0.6  Large leak: 4%  90% pressure: 9.8  Max: 10.0 cmH20  Min: 9.0 cmH20

## 2022-03-22 NOTE — PERIOP NOTES
[facility]  Ambulatory Surgery Unit  Pre-operative Instructions    Procedure Date  3/30/2022            Tentative Arrival Time TBD      1. On the day of your procedure, please report to the Ambulatory Surgery Unit Registration Desk and sign in at your designated time. The Ambulatory Surgery Unit is located in [facility] on the [facility] side of the \[facility]\"" across from the [address] [place] [address]. Please have all of your health insurance cards and a photo ID. **Due to current COVID restrictions, only ONE adult may accompany you the day of the procedure. We have limited seating available. If our waiting room is at capacity, your ride may be asked to remain in their vehicle. No children are allowed in the waiting room. 2. You must have someone with you to drive you home as directed by your surgeon. 3. You may have a light breakfast and take normal morning medications. 4. We recommend you do not drink any alcoholic beverages for 24 hours before and after your procedure. 5. Contact your surgeons office for instructions on the following medications: non-steroidal anti-inflammatory drugs (i.e. Advil, Aleve), vitamins, and supplements. (Some surgeons will want you to stop these medications prior to surgery and others may allow you to take them)   **If you are currently taking Plavix, Coumadin, Aspirin and/or other blood-thinning agents, contact your surgeon for instructions. ** Your surgeon will partner with the physician prescribing these medications to determine if it is safe to stop or if you need to continue taking. Please do not stop taking these medications without instructions from your surgeon. 6. In an effort to help prevent surgical site infection, we ask that you shower with an anti-bacterial soap (i.e. Dial or Safeguard) on the morning of your procedure. Do not apply any lotions, powders, or deodorants after showering. 7. Wear comfortable clothes.  Wear glasses instead of contacts. Do not bring any jewelry or money (other than copays or fees as instructed). Do not wear make-up, particularly mascara, the morning of your procedure. Wear your hair loose or down, no ponytails, buns, mackenzie pins or clips. All body piercings must be removed. 8. You should understand that if you do not follow these instructions your procedure may be cancelled. If your physical condition changes (i.e. fever, cold or flu) please contact your surgeon as soon as possible. 9. It is important that you be on time. If a situation occurs where you may be late, or if you have any questions or problems, please call [phone].    10. Your procedure time may be subject to change. You will receive a phone call the day prior to confirm your arrival time. I understand a pre-operative phone call will be made to verify my procedure time. In the event that I am not available, I give permission for a message to be left on my answering service and/or with another person?       Yes    Reviewed instructions, patient able to verbalized understanding.          ___________________      ___________________      ___________________  (Signature of Patient)          (Witness)                   (Date and Time)

## 2022-03-22 NOTE — PATIENT INSTRUCTIONS
[address] [facility] [place], [address]  Tel.  [phone]  Fax. 100 [facility] 60  [place], [address] [facility]  Tel.  [phone]  Fax. [phone] [address]  Tel.  [phone]  Fax. [phone]     Sleep Apnea: After Your Visit  Your Care Instructions  Sleep apnea occurs when you frequently stop breathing for 10 seconds or longer during sleep. It can be mild to severe, based on the number of times per hour that you stop breathing or have slowed breathing. Blocked or narrowed airways in your nose, mouth, or throat can cause sleep apnea. Your airway can become blocked when your throat muscles and tongue relax during sleep. Sleep apnea is common, occurring in 1 out of 20 individuals. Individuals having any of the following characteristics should be evaluated and treated right away due to high risk and detrimental consequences from untreated sleep apnea:  1. Obesity  2. Congestive Heart failure  3. Atrial Fibrillation  4. Uncontrolled Hypertension  5. Type II Diabetes  6. Night-time Arrhythmias  7. Stroke  8. Pulmonary Hypertension  9. High-risk Driving Populations (pilots, truck drivers, etc.)  10. Patients Considering Weight-loss Surgery    How do you know you have sleep apnea? You probably have sleep apnea if you answer 'yes' to 3 or more of the following questions:  S - Have you been told that you Snore? T - Are you often Tired during the day? O - Has anyone Observed you stop breathing while sleeping? P- Do you have (or are being treated for) high blood Pressure? B - Are you obese (Body Mass Index > 35)? A - Is your Age 48years old or older? N - Is your Neck size greater than 16 inches? G - Are you male Gender? A sleep physician can prescribe a breathing device that prevents tissues in the throat from blocking your airway.  Or your doctor may recommend using a dental device (oral breathing device) to help keep your airway open. In some cases, surgery may be needed to remove enlarged tissues in the throat. Follow-up care is a key part of your treatment and safety. Be sure to make and go to all appointments, and call your doctor if you are having problems. It's also a good idea to know your test results and keep a list of the medicines you take. How can you care for yourself at home? · Lose weight, if needed. It may reduce the number of times you stop breathing or have slowed breathing. · Go to bed at the same time every night. · Sleep on your side. It may stop mild apnea. If you tend to roll onto your back, sew a pocket in the back of your pajama top. Put a tennis ball into the pocket, and stitch the pocket shut. This will help keep you from sleeping on your back. · Avoid alcohol and medicines such as sleeping pills and sedatives before bed. · Do not smoke. Smoking can make sleep apnea worse. If you need help quitting, talk to your doctor about stop-smoking programs and medicines. These can increase your chances of quitting for good. · Prop up the head of your bed 4 to 6 inches by putting bricks under the legs of the bed. · Treat breathing problems, such as a stuffy nose, caused by a cold or allergies. · Use a continuous positive airway pressure (CPAP) breathing machine if lifestyle changes do not help your apnea and your doctor recommends it. The machine keeps your airway from closing when you sleep. · If CPAP does not help you, ask your doctor whether you should try other breathing machines. A bilevel positive airway pressure machine has two types of air pressureâone for breathing in and one for breathing out. Another device raises or lowers air pressure as needed while you breathe. · If your nose feels dry or bleeds when using one of these machines, talk with your doctor about increasing moisture in the air. A humidifier may help.   · If your nose is runny or stuffy from using a breathing machine, talk with your doctor about using decongestants or a corticosteroid nasal spray. When should you call for help? Watch closely for changes in your health, and be sure to contact your doctor if:  · You still have sleep apnea even though you have made lifestyle changes. · You are thinking of trying a device such as CPAP. · You are having problems using a CPAP or similar machine. Where can you learn more? Go to [employer]. Enter L400 in the search box to learn more about \"Sleep Apnea: After Your Visit. \"   © 3240-4026 Healthwise, Incorporated. Care instructions adapted under license by [facility] (which disclaims liability or warranty for this information). This care instruction is for use with your licensed healthcare professional. If you have questions about a medical condition or this instruction, always ask your healthcare professional. Chance Abdullahi any warranty or liability for your use of this information. PROPER SLEEP HYGIENE    What to avoid  · Do not have drinks with caffeine, such as coffee or black tea, for 8 hours before bed. · Do not smoke or use other types of tobacco near bedtime. Nicotine is a stimulant and can keep you awake. · Avoid drinking alcohol late in the evening, because it can cause you to wake in the middle of the night. · Do not eat a big meal close to bedtime. If you are hungry, eat a light snack. · Do not drink a lot of water close to bedtime, because the need to urinate may wake you up during the night. · Do not read or watch TV in bed. Use the bed only for sleeping and sexual activity. What to try  · Go to bed at the same time every night, and wake up at the same time every morning. Do not take naps during the day. · Keep your bedroom quiet, dark, and cool. · Get regular exercise, but not within 3 to 4 hours of your bedtime. .  · Sleep on a comfortable pillow and mattress.   · If watching the clock makes you anxious, turn it facing away from you so you cannot see the time. · If you worry when you lie down, start a worry book. Well before bedtime, write down your worries, and then set the book and your concerns aside. · Try meditation or other relaxation techniques before you go to bed. · If you cannot fall asleep, get up and go to another room until you feel sleepy. Do something relaxing. Repeat your bedtime routine before you go to bed again. · Make your house quiet and calm about an hour before bedtime. Turn down the lights, turn off the TV, log off the computer, and turn down the volume on music. This can help you relax after a busy day. Drowsy Driving  The [address] [place] [address] Road Traffic Safety Administration cites drowsiness as a causing factor in more than 185,518 police reported crashes annually, resulting in 76,000 injuries and 1,500 deaths. Other surveys suggest 55% of people polled have driven while drowsy in the past year, 23% had fallen asleep but not crashed, 3% crashed, and 2% had and accident due to drowsy driving. Who is at risk? Young Drivers: One study of drowsy driving accidents states that 55% of the drivers were under 25 years. Of those, 75% were male. Shift Workers and Travelers: People who work overnight or travel across time zones frequently are at higher risk of experiencing Circadian Rhythm Disorders. They are trying to work and function when their body is programed to sleep. Sleep Deprived: Lack of sleep has a serious impact on your ability to pay attention or focus on a task. Consistently getting less than the average of 8 hours your body needs creates partial or cumulative sleep deprivation. Untreated Sleep Disorders: Sleep Apnea, Narcolepsy, R.L.S., and other sleep disorders (untreated) prevent a person from getting enough restful sleep. This leads to excessive daytime sleepiness and increases the risk for drowsy driving accidents by up to 7 times.   Medications / Alcohol: Even over the counter medications can cause drowsiness. Medications that impair a drivers attention should have a warning label. Alcohol naturally makes you sleepy and on its own can cause accidents. Combined with excessive drowsiness its effects are amplified. Signs of Drowsy Driving:   * You don't remember driving the last few miles   * You may drift out of your miguel   * You are unable to focus and your thoughts wander   * You may yawn more often than normal   * You have difficulty keeping your eyes open / nodding off   * Missing traffic signs, speeding, or tailgating  Prevention-   Good sleep hygiene, lifestyle and behavioral choices have the most impact on drowsy driving. There is no substitute for sleep and the average person requires 8 hours nightly. If you find yourself driving drowsy, stop and sleep. Consider the sleep hygiene tips provided during your visit as well. Medication Refill Policy: Refills for all medications require 1 week advance notice. Please have your pharmacy fax a refill request. We are unable to fax, or call in \"controled substance\" medications and you will need to pick these prescriptions up from our office. ExtendEvent Activation    Thank you for requesting access to Ext[employer]. Please follow the instructions below to securely access and download your online medical record. ExtendEvent allows you to send messages to your doctor, view your test results, renew your prescriptions, schedule appointments, and more. How Do I Sign Up? 1. In your internet browser, go to [email or web address]. 2. Click on the First Time User? Click Here link in the Sign In box. You will see the New Member Sign Up page. 3. Enter your ExtendEvent Access Code exactly as it appears below. You will not need to use this code after youve completed the sign-up process. If you do not sign up before the expiration date, you must request a new code. ExtendEvent Access Code:  Activation code not generated  Current ExtendEvent Status: Active (This is the date your [employer] access code will )    4. Enter the last four digits of your Social Security Number (xxxx) and Date of Birth (mm/dd/yyyy) as indicated and click Submit. You will be taken to the next sign-up page. 5. Create a [employer] ID. This will be your [employer] login ID and cannot be changed, so think of one that is secure and easy to remember. 6. Create a [employer] password. You can change your password at any time. 7. Enter your Password Reset Question and Answer. This can be used at a later time if you forget your password. 8. Enter your e-mail address. You will receive e-mail notification when new information is available in [address] 9. Click Sign Up. You can now view and download portions of your medical record. 10. Click the Download Summary menu link to download a portable copy of your medical information. Additional Information    If you have questions, please call [phone]. Remember, [employer] is NOT to be used for urgent needs. For medical emergencies, dial 911.

## 2022-03-29 PROBLEM — H26.491 RIGHT POSTERIOR CAPSULAR OPACIFICATION: Status: ACTIVE | Noted: 2022-03-29

## 2022-03-29 PROBLEM — H26.491 POSTERIOR CAPSULAR OPACIFICATION VISUALLY SIGNIFICANT OF RIGHT EYE: Status: ACTIVE | Noted: 2022-03-29

## 2022-03-30 ENCOUNTER — HOSPITAL ENCOUNTER (OUTPATIENT)
Age: 72
Setting detail: OUTPATIENT SURGERY
Discharge: HOME | End: 2022-03-30
Attending: OPHTHALMOLOGY | Admitting: OPHTHALMOLOGY
Payer: MEDICARE

## 2022-03-30 VITALS
OXYGEN SATURATION: 100 % | HEIGHT: 67 IN | TEMPERATURE: 97.5 F | WEIGHT: 175 LBS | SYSTOLIC BLOOD PRESSURE: 131 MMHG | BODY MASS INDEX: 27.47 KG/M2 | RESPIRATION RATE: 17 BRPM | HEART RATE: 49 BPM | DIASTOLIC BLOOD PRESSURE: 71 MMHG

## 2022-03-30 PROCEDURE — 74011000250 HC RX REV CODE- 250: Performed by: OPHTHALMOLOGY

## 2022-03-30 PROCEDURE — 74011250637 HC RX REV CODE- 250/637: Performed by: OPHTHALMOLOGY

## 2022-03-30 PROCEDURE — 76030000017 HC AMB SURG FIRST 0.5 HR INTENSV-TIER 1: Performed by: OPHTHALMOLOGY

## 2022-03-30 PROCEDURE — 74011000250 HC RX REV CODE- 250

## 2022-03-30 RX ORDER — PROPARACAINE HYDROCHLORIDE 5 MG/ML
SOLUTION/ DROPS OPHTHALMIC
Status: DISCONTINUED
Start: 2022-03-30 | End: 2022-03-30 | Stop reason: HOSPADM

## 2022-03-30 RX ORDER — TROPICAMIDE 10 MG/ML
SOLUTION/ DROPS OPHTHALMIC
Status: DISCONTINUED
Start: 2022-03-30 | End: 2022-03-30 | Stop reason: HOSPADM

## 2022-03-30 RX ORDER — PREDNISOLONE ACETATE 10 MG/ML
1 SUSPENSION/ DROPS OPHTHALMIC ONCE
Status: COMPLETED | OUTPATIENT
Start: 2022-03-30 | End: 2022-03-30

## 2022-03-30 RX ORDER — PROPARACAINE HYDROCHLORIDE 5 MG/ML
1 SOLUTION/ DROPS OPHTHALMIC ONCE
Status: COMPLETED | OUTPATIENT
Start: 2022-03-30 | End: 2022-03-30

## 2022-03-30 RX ORDER — TROPICAMIDE 10 MG/ML
1 SOLUTION/ DROPS OPHTHALMIC ONCE
Status: COMPLETED | OUTPATIENT
Start: 2022-03-30 | End: 2022-03-30

## 2022-03-30 RX ADMIN — BALANCED SALT SOLUTION 20 DROP: 6.4; .75; .48; .3; 3.9; 1.7 SOLUTION OPHTHALMIC at 08:05

## 2022-03-30 RX ADMIN — PREDNISOLONE ACETATE 1 DROP: 10 SUSPENSION/ DROPS OPHTHALMIC at 08:06

## 2022-03-30 RX ADMIN — HYPROMELLOSE 2910 (4000 MPA.S) 1 DROP: 25 SOLUTION/ DROPS OPHTHALMIC at 08:04

## 2022-03-30 RX ADMIN — TROPICAMIDE 1 DROP: 10 SOLUTION/ DROPS OPHTHALMIC at 07:02

## 2022-03-30 RX ADMIN — PROPARACAINE HYDROCHLORIDE 1 DROP: 5 SOLUTION/ DROPS OPHTHALMIC at 07:02

## 2022-03-30 NOTE — OP NOTES
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 ANESTHESIA: Topical.    LASER PARAMETERS:  Power:  2.6 millijoules per shot. Total shots delivered:  15    Estimated blood loss:None  Complications:None  Specimen removed: None    DESCRIPTION OF PROCEDURE: The patient was brought to the holding area where she received several instillations of Mydriacyl 1%, Maxi-Synephrine 2.5%, and tetracaine until adequate pupillary dilatation was achieved. The patient's vital signs were recorded. The patient was then brought to the laser suite and received 1 drop of tetracaine preoperatively. The patient was then seated at the laser and the Marco Antonio capsulotomy lens was placed on the eye. The patient's posterior capsular opacification was then cleared utilizing the laser. Following this the eye was rinsed with BSS solution to remove the Goniosol solution from the surface of the eye, and the patient received 1 drop of fluorometholone drops in the operated eye. Iopidine was used at the discretion of the surgeon depending on the amount of energy necessary to clear the opacified capsule. Instructions were given to the patient verbally and written to use her FML drops 3 times a day at 9 a.m., 3 p.m., and 9 p.m. for the next 3 days. The patient will be following up with us postoperatively in the office.     [address] [place] [address]  3/30/2022

## 2022-04-04 PROBLEM — H26.492 POSTERIOR CAPSULAR OPACIFICATION VISUALLY SIGNIFICANT OF LEFT EYE: Status: ACTIVE | Noted: 2022-04-04

## 2022-04-04 PROBLEM — H26.491 POSTERIOR CAPSULAR OPACIFICATION VISUALLY SIGNIFICANT OF RIGHT EYE: Status: RESOLVED | Noted: 2022-03-29 | Resolved: 2022-04-04

## 2022-04-04 NOTE — PERIOP NOTES
[facility]  Ambulatory Surgery Unit  Pre-operative Instructions    Procedure Date  Wednesday April 6th            Tentative Arrival Time TBD      1. On the day of your procedure, please report to the Ambulatory Surgery Unit Registration Desk and sign in at your designated time. The Ambulatory Surgery Unit is located in [facility] on the [facility] side of the [facility] across from the [address] [place] [address]. Please have all of your health insurance cards and a photo ID.    **TWO adults may accompany you the day of the procedure. We have limited seating available. If our waiting room is at capacity, your ride may be asked to remain in their vehicle. No one under 15 is allowed in the waiting room. Masks, fully covering the mouth and nose, are required in the waiting room. 2. You must have someone with you to drive you home as directed by your surgeon. 3. You may have a light breakfast and take normal morning medications. 4. We recommend you do not drink any alcoholic beverages for 24 hours before and after your procedure. 5. Contact your surgeons office for instructions on the following medications: non-steroidal anti-inflammatory drugs (i.e. Advil, Aleve), vitamins, and supplements. (Some surgeons will want you to stop these medications prior to surgery and others may allow you to take them)   **If you are currently taking Plavix, Coumadin, Aspirin and/or other blood-thinning agents, contact your surgeon for instructions. ** Your surgeon will partner with the physician prescribing these medications to determine if it is safe to stop or if you need to continue taking. Please do not stop taking these medications without instructions from your surgeon. 6. In an effort to help prevent surgical site infection, we ask that you shower with an anti-bacterial soap (i.e. Dial or Safeguard) on the morning of your procedure.  Do not apply any lotions, powders, or deodorants after showering. 7. Wear comfortable clothes. Wear glasses instead of contacts. Do not bring any jewelry or money (other than copays or fees as instructed). Do not wear make-up, particularly mascara, the morning of your procedure. Wear your hair loose or down, no ponytails, buns, mackenzie pins or clips. All body piercings must be removed. 8. You should understand that if you do not follow these instructions your procedure may be cancelled. If your physical condition changes (i.e. fever, cold or flu) please contact your surgeon as soon as possible. 9. It is important that you be on time. If a situation occurs where you may be late, or if you have any questions or problems, please call [phone].    10. Your procedure time may be subject to change. You will receive a phone call the day prior to confirm your arrival time. I understand a pre-operative phone call will be made to verify my procedure time. In the event that I am not available, I give permission for a message to be left on my answering service and/or with another person?       Yes    Reviewed instructions via telephone, patient verbalized understanding         ___________________      ___________________      ___________________  (Signature of Patient)          (Witness)                   (Date and Time)

## 2022-04-06 ENCOUNTER — HOSPITAL ENCOUNTER (OUTPATIENT)
Age: 72
Setting detail: OUTPATIENT SURGERY
Discharge: HOME | End: 2022-04-06
Attending: OPHTHALMOLOGY | Admitting: OPHTHALMOLOGY
Payer: MEDICARE

## 2022-04-06 VITALS
HEIGHT: 67 IN | SYSTOLIC BLOOD PRESSURE: 143 MMHG | BODY MASS INDEX: 27.47 KG/M2 | HEART RATE: 47 BPM | RESPIRATION RATE: 16 BRPM | OXYGEN SATURATION: 99 % | WEIGHT: 175 LBS | DIASTOLIC BLOOD PRESSURE: 86 MMHG | TEMPERATURE: 97.8 F

## 2022-04-06 PROCEDURE — 74011250637 HC RX REV CODE- 250/637: Performed by: OPHTHALMOLOGY

## 2022-04-06 PROCEDURE — 74011000250 HC RX REV CODE- 250: Performed by: OPHTHALMOLOGY

## 2022-04-06 PROCEDURE — 76030000017 HC AMB SURG FIRST 0.5 HR INTENSV-TIER 1: Performed by: OPHTHALMOLOGY

## 2022-04-06 PROCEDURE — 74011000250 HC RX REV CODE- 250

## 2022-04-06 RX ORDER — TROPICAMIDE 10 MG/ML
SOLUTION/ DROPS OPHTHALMIC
Status: COMPLETED
Start: 2022-04-06 | End: 2022-04-06

## 2022-04-06 RX ORDER — PROPARACAINE HYDROCHLORIDE 5 MG/ML
1 SOLUTION/ DROPS OPHTHALMIC ONCE
Status: COMPLETED | OUTPATIENT
Start: 2022-04-06 | End: 2022-04-06

## 2022-04-06 RX ORDER — TROPICAMIDE 10 MG/ML
1 SOLUTION/ DROPS OPHTHALMIC ONCE
Status: COMPLETED | OUTPATIENT
Start: 2022-04-06 | End: 2022-04-06

## 2022-04-06 RX ORDER — PREDNISOLONE ACETATE 10 MG/ML
1 SUSPENSION/ DROPS OPHTHALMIC ONCE
Status: COMPLETED | OUTPATIENT
Start: 2022-04-06 | End: 2022-04-06

## 2022-04-06 RX ADMIN — BALANCED SALT SOLUTION 20 DROP: 6.4; .75; .48; .3; 3.9; 1.7 SOLUTION OPHTHALMIC at 07:43

## 2022-04-06 RX ADMIN — TROPICAMIDE 1 DROP: 10 SOLUTION/ DROPS OPHTHALMIC at 06:48

## 2022-04-06 RX ADMIN — PREDNISOLONE ACETATE 1 DROP: 10 SUSPENSION/ DROPS OPHTHALMIC at 07:44

## 2022-04-06 RX ADMIN — HYPROMELLOSE 2910 (4000 MPA.S) 1 DROP: 25 SOLUTION/ DROPS OPHTHALMIC at 07:42

## 2022-04-06 RX ADMIN — PROPARACAINE HYDROCHLORIDE 1 DROP: 5 SOLUTION/ DROPS OPHTHALMIC at 06:48

## 2022-04-06 NOTE — OP NOTES
Name:  Aixa Recinos MASC-2       updated  3/1/13     Description:  YAG laser capsulotomy      PREOPERATIVE DIAGNOSIS: Visually impairing posterior capsular opacification Left eye    POSTOPERATIVE DIAGNOSIS: Same    OPERATION: YAG laser capsulotomy,[place]. ANESTHESIA: Topical.    LASER PARAMETERS:  Power:  2.6 millijoules per shot. Total shots delivered:  27    Estimated blood loss:None  Complications:None  Specimen removed: None    DESCRIPTION OF PROCEDURE: The patient was brought to the holding area where she received several instillations of Mydriacyl 1%, Maxi-Synephrine 2.5%, and tetracaine until adequate pupillary dilatation was achieved. The patient's vital signs were recorded. The patient was then brought to the laser suite and received 1 drop of tetracaine preoperatively. The patient was then seated at the laser and the Marco Antonio capsulotomy lens was placed on the eye. The patient's posterior capsular opacification was then cleared utilizing the laser. Following this the eye was rinsed with BSS solution to remove the Goniosol solution from the surface of the eye, and the patient received 1 drop of fluorometholone drops in the operated eye. Iopidine was used at the discretion of the surgeon depending on the amount of energy necessary to clear the opacified capsule. Instructions were given to the patient verbally and written to use her FML drops 3 times a day at 9 a.m., 3 p.m., and 9 p.m. for the next 3 days. The patient will be following up with us postoperatively in the office.     [address] [place] [address]  4/6/2022

## 2022-04-13 ENCOUNTER — OFFICE VISIT (OUTPATIENT)
Dept: SLEEP MEDICINE | Age: 72
End: 2022-04-13

## 2022-04-13 ENCOUNTER — HOSPITAL ENCOUNTER (OUTPATIENT)
Dept: SLEEP MEDICINE | Age: 72
Discharge: HOME | End: 2022-04-13
Payer: MEDICARE

## 2022-04-13 DIAGNOSIS — G47.33 OSA (OBSTRUCTIVE SLEEP APNEA): Primary | ICD-10-CM

## 2022-04-13 PROCEDURE — 95806 SLEEP STUDY UNATT&RESP EFFT: CPT | Performed by: INTERNAL MEDICINE

## 2022-04-13 NOTE — PROGRESS NOTES
S>Rafita Saul is a 74 y.o. male seen today to receive a home sleep testing unit (HST). · Patient was educated on proper hookup and operation of the HST via detailed instruction sheet (per COVID-19 precautions)  · Belts were fitted and adjusted for the patient during this session. · Instruction forms with after hours contact and documentation were signed. O>    There were no vitals taken for this visit. A>  No diagnosis found. P>  · General information regarding operations and maintenance of the device was provided. · Follow-up appointment was made to return the [facility] 4/14/22. He will be contacted once the results have been reviewed. · He was asked to contact our office for any problems regarding his home sleep test study.

## 2022-04-15 RX ORDER — CETIRIZINE HYDROCHLORIDE 10 MG/1
TABLET, FILM COATED ORAL
Qty: 90 TABLET | Refills: 0 | Status: SHIPPED | OUTPATIENT
Start: 2022-04-15

## 2022-04-15 RX ORDER — HYDROCHLOROTHIAZIDE 25 MG/1
TABLET ORAL
Qty: 90 TABLET | Refills: 1 | Status: SHIPPED | OUTPATIENT
Start: 2022-04-15

## 2022-04-15 NOTE — TELEPHONE ENCOUNTER
Cardiologist: Dr. Meera Smith    Last appt: 3/9/2022  Future Appointments   Date Time Provider Tamiko Duroni   6/20/2022  9:20 AM Drea Speaker, MD [facility]       Requested Prescriptions     Signed Prescriptions Disp Refills    hydroCHLOROthiazide (HYDRODIURIL) 25 mg tablet 90 Tablet 1     Sig: Take 1 tablet by mouth once daily     Authorizing Provider: Leighton Severin     Ordering User: ANNETTE STEPHENSON         Refills VO per Dr. Meera Smith.

## 2022-04-16 ENCOUNTER — TELEPHONE (OUTPATIENT)
Dept: SLEEP MEDICINE | Age: 72
End: 2022-04-16

## 2022-04-16 DIAGNOSIS — G47.33 OBSTRUCTIVE SLEEP APNEA (ADULT) (PEDIATRIC): Primary | ICD-10-CM

## 2022-04-20 ENCOUNTER — PATIENT MESSAGE (OUTPATIENT)
Dept: SLEEP MEDICINE | Age: 72
End: 2022-04-20

## 2022-04-20 NOTE — TELEPHONE ENCOUNTER
Results of sleep study in [facility]  Lead tech to convey results to patient    HSAT results in [facility]. Test positive for severe sleep apnea. AHI 36/hour and lowest oxygen saturation was 76%. We had discussed treatment options at initial consultation. Based on the results of the home sleep apnea test, I believe a trial of APAP would be an effective mode of therapy. APAP order attached. he should be seen in the sleep disorder center 4-6 weeks after initiating PAP therapy. Patient should call the office the day he gets set up with new PAP device so we can schedule him for an adherence/compliance visit within 31-90 days of obtaining a new device. Front staff to Order PAP and call patient and let them know which DME company they should be hearing from after results reviewed with lead support technologist.     he will need a first adherence visit.

## 2022-04-21 ENCOUNTER — DOCUMENTATION ONLY (OUTPATIENT)
Dept: SLEEP MEDICINE | Age: 72
End: 2022-04-21

## 2022-05-06 ENCOUNTER — OFFICE VISIT (OUTPATIENT)
Dept: ORTHOPEDIC SURGERY | Age: 72
End: 2022-05-06
Payer: MEDICARE

## 2022-05-06 VITALS — BODY MASS INDEX: 27.47 KG/M2 | HEIGHT: 67 IN | WEIGHT: 175 LBS

## 2022-05-06 DIAGNOSIS — M17.12 PRIMARY OSTEOARTHRITIS OF LEFT KNEE: ICD-10-CM

## 2022-05-06 DIAGNOSIS — Z96.642 STATUS POST LEFT HIP REPLACEMENT: ICD-10-CM

## 2022-05-06 DIAGNOSIS — G89.29 BILATERAL CHRONIC KNEE PAIN: Primary | ICD-10-CM

## 2022-05-06 DIAGNOSIS — Z96.641 STATUS POST RIGHT HIP REPLACEMENT: ICD-10-CM

## 2022-05-06 DIAGNOSIS — M25.561 BILATERAL CHRONIC KNEE PAIN: Primary | ICD-10-CM

## 2022-05-06 DIAGNOSIS — M25.561 RIGHT KNEE PAIN, UNSPECIFIED CHRONICITY: ICD-10-CM

## 2022-05-06 DIAGNOSIS — M25.562 BILATERAL CHRONIC KNEE PAIN: Primary | ICD-10-CM

## 2022-05-06 PROCEDURE — 1101F PT FALLS ASSESS-DOCD LE1/YR: CPT | Performed by: ORTHOPAEDIC SURGERY

## 2022-05-06 PROCEDURE — G8419 CALC BMI OUT NRM PARAM NOF/U: HCPCS | Performed by: ORTHOPAEDIC SURGERY

## 2022-05-06 PROCEDURE — G8756 NO BP MEASURE DOC: HCPCS | Performed by: ORTHOPAEDIC SURGERY

## 2022-05-06 PROCEDURE — G8536 NO DOC ELDER MAL SCRN: HCPCS | Performed by: ORTHOPAEDIC SURGERY

## 2022-05-06 PROCEDURE — G8427 DOCREV CUR MEDS BY ELIG CLIN: HCPCS | Performed by: ORTHOPAEDIC SURGERY

## 2022-05-06 PROCEDURE — 99214 OFFICE O/P EST MOD 30 MIN: CPT | Performed by: ORTHOPAEDIC SURGERY

## 2022-05-06 PROCEDURE — G8432 DEP SCR NOT DOC, RNG: HCPCS | Performed by: ORTHOPAEDIC SURGERY

## 2022-05-06 PROCEDURE — 3017F COLORECTAL CA SCREEN DOC REV: CPT | Performed by: ORTHOPAEDIC SURGERY

## 2022-05-06 NOTE — PROGRESS NOTES
Rl Husbands (: 1950) is a 74 y.o. male, patient, here for evaluation of the following chief complaint(s):  Knee Pain (bilateral knee pain )       HPI:    Chief complaint is left knee pain. Secondary complaint is right knee pain and bilateral hip pain. He is here today to schedule left knee replacement. Symptoms have been slowly progressing. Pain is on the lateral side of his knee. Declining function. Hard to do his activities daily living and his work around the house. 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 No Known Allergies    Current Outpatient Medications   Medication Sig    Allergy Relief, cetirizine, 10 mg tablet Take 1 tablet by mouth nightly    hydroCHLOROthiazide (HYDRODIURIL) 25 mg tablet Take 1 tablet by mouth once daily    amiodarone (PACERONE) 100 mg tablet Take 1 Tablet by mouth daily. (Patient taking differently: Take 50 mg by mouth daily.)    losartan (COZAAR) 50 mg tablet Take 1 Tablet by mouth daily.  rivaroxaban (Xarelto) 20 mg tab tablet Take 1 Tablet by mouth daily.  ascorbic acid, vitamin C, (Vitamin C) 500 mg tablet Take 500 mg by mouth daily.  zinc 50 mg tab tablet Take 50 mg by mouth daily.  b complex vitamins tablet Take 2 Tablets by mouth daily. No current facility-administered medications for this visit.        Past Medical History:   Diagnosis Date    A-fib [facility])     Arrhythmia 2021    Cardioverted     JINA (generalized anxiety disorder) 2019    Hypertension     OA (osteoarthritis) 2011    Sleep apnea         Past Surgical History:   Procedure Laterality Date    HX CATARACT REMOVAL Left     left     HX CATARACT REMOVAL Right 2018    HX HEENT Right     Yag Capsulotomy    HX HIP REPLACEMENT Left 1999    HX HIP REPLACEMENT Right     Right x2    NM ANESTH,KNEE AREA SURGERY  1966       Family History   Problem Relation Age of Onset    Lung Disease Mother     Heart Disease Father    Harpreet Gomes Hypertension Sister     Hypertension Son     Hypertension Daughter     Hypertension Daughter         Social History     Socioeconomic History    Marital status:      Spouse name: Not on file    Number of children: Not on file    Years of education: Not on file    Highest education level: Not on file   Occupational History     Employer: [employer]   Tobacco Use    Smoking status: Never Smoker    Smokeless tobacco: Never Used   Vaping Use    Vaping Use: Never used   Substance and Sexual Activity    Alcohol use: Yes     Alcohol/week: 4.0 standard drinks     Types: 2 Cans of beer, 2 Standard drinks or equivalent per week     Comment: socially     Drug use: No    Sexual activity: Yes     Partners: Female     Birth control/protection: None   Other Topics Concern    Not on file   Social History Narrative    Not on file     Social Determinants of Health     Financial Resource Strain:     Difficulty of Paying Living Expenses: Not on file   Food Insecurity:     Worried About Running Out of Food in the Last Year: Not on file    Dez of Food in the Last Year: Not on file   Transportation Needs:     Lack of Transportation (Medical): Not on file    Lack of Transportation (Non-Medical):  Not on file   Physical Activity:     Days of Exercise per Week: Not on file    Minutes of Exercise per Session: Not on file   Stress:     Feeling of Stress : Not on file   Social Connections:     Frequency of Communication with Friends and Family: Not on file    Frequency of Social Gatherings with Friends and Family: Not on file    Attends Shinto Services: Not on file    Active Member of Clubs or Organizations: Not on file    Attends Club or Organization Meetings: Not on file    Marital Status: Not on file   Intimate Partner Violence:     Fear of Current or Ex-Partner: Not on file    Emotionally Abused: Not on file    Physically Abused: Not on file    Sexually Abused: Not on file   Housing Stability:     Unable to Pay for Housing in the Last Year: Not on file    Number of Places Lived in the Last Year: Not on file    Unstable Housing in the Last Year: Not on file       ROS     Positive for: Musculoskeletal    Last edited by Roselia Mars on 5/6/2022  1:53 PM. (History)            Vitals:  Ht 5' 7\" (1.702 m)   Wt 175 lb (79.4 kg)   BMI 27.41 kg/m²    Body mass index is 27.41 kg/m². PHYSICAL EXAM:  Patient's bilateral hips have painless range of motion. Patient's right knee has a valgus malalignment with range of motion 0 to 120 degrees. There is no knee instability. Mild effusion. Lateral joint line tenderness. Patient's left knee has significant valgus malalignment which is fixed. 3 to 4 degree flexion contracture with flexion to 120 degrees. Pain over the lateral joint line. Moderate-sized effusion. IMAGING:  XR Results (most recent):  Results from Appointment encounter on 05/06/22    XR KNEES BI MIN 4 V    Narrative  X-rays were obtained of the bilateral knees. 4 views. Right knee has some joint space narrowing laterally not bone to bone with chondrocalcinosis. The left knee has large cysts in the bone. Severe bone-on-bone in the lateral compartment with lateral osteophytes there is some bony erosions in the lateral compartment of the left knee. ASSESSMENT/PLAN:  1. Bilateral chronic knee pain  -     XR KNEES BI MIN 4 V; Future  2. Status post left hip replacement  3. Status post right hip replacement  4. Primary osteoarthritis of left knee  5. Right knee pain, unspecified chronicity    Multiple issues but most limited by his left knee. Scheduled for left total knee. Would like to x-rays hips when he follows up after his knee surgery. Right knee looks okay. Patient will need clearance from his personal physician Dr. Abeba Cota. Recent onset of atrial fibrillation but he has been cardioverted. He is on Xarelto now.   Would like him to hold that prior to surgery but will restart immediately after. The longer he holds prior to surgery the better his hemostasis control will be. We discussed continued conservative treatment measures versus total joint replacement. Symptoms have progressed despite conservative treatment measures outlined above and they desire to proceed with left total knee. Patient has had symptoms for over 12 months. They have decline in their activities of daily living with inability to walk long distances. There has been progressive decline in function. Discussed risks, benefits, and alternatives in detail, as well as anticipated hospital stay and course of rehabilitation. They will see their primary care physician prior to surgery. All questions answered. An electronic signature was used to authenticate this note.   --Harry Quigley MD

## 2022-05-09 DIAGNOSIS — M17.12 PRIMARY OSTEOARTHRITIS OF LEFT KNEE: Primary | ICD-10-CM

## 2022-05-27 ENCOUNTER — TELEPHONE (OUTPATIENT)
Dept: SLEEP MEDICINE | Age: 72
End: 2022-05-27

## 2022-06-01 NOTE — TELEPHONE ENCOUNTER
Tech to kindly [employer] and route to me  Feels he may need more pressure at beginning    Consider change to 6-14 cmH20 and turn off ramp    Needs first adherence before end of June.  Staff to kindly set up for followup before end of month (he is having surgery)

## 2022-06-07 NOTE — TELEPHONE ENCOUNTER
Download in chart for Dr. Katya Bender to review. Front staff to schedule first adherence before end of June. Thank you.

## 2022-06-09 ENCOUNTER — PATIENT MESSAGE (OUTPATIENT)
Dept: SLEEP MEDICINE | Age: 72
End: 2022-06-09

## 2022-06-09 NOTE — TELEPHONE ENCOUNTER
Pressure changed remotely to APAP [id number] and ramp turned off. Patient advised through Mychart of change.

## 2022-06-13 ENCOUNTER — HOSPITAL ENCOUNTER (OUTPATIENT)
Dept: GENERAL RADIOLOGY | Age: 72
Discharge: HOME | End: 2022-06-13
Attending: SPECIALIST
Payer: MEDICARE

## 2022-06-13 DIAGNOSIS — Z79.899 ON AMIODARONE THERAPY: ICD-10-CM

## 2022-06-13 PROCEDURE — 71046 X-RAY EXAM CHEST 2 VIEWS: CPT

## 2022-06-16 NOTE — PROGRESS NOTES
[address] [facility] [place], [address]   Tel.  [phone]   Fax. [address] [facility] [address]   [place], [address] [facility]   Tel.  [phone]   Fax. [phone] [address], [address]   Tel.  [phone]   Fax. [phone]     Renay Runcharly (: 1950) is a 74 y.o. male, established patient, seen for positive airway pressure follow-up, he was last seen by Dr. Valorie Arizmendi on 3/2022, prior notes reviewed in detail. Home sleep test 2022 showed an overall AHI 36/hr and lowest oxygen saturation was 76%. He is seen today for follow up. ASSESSMENT/PLAN:    ICD-10-CM ICD-9-CM    1. SAGE (obstructive sleep apnea)  G47.33 327.23 AMB SUPPLY ORDER   2. Primary hypertension  I10 401.9    3. BMI 28.0-28.9,adult  Z68.28 V85.24      AHI = 36 (). On APAP :  5-14 cmH2O. Set up ? Rishabh Herrera He is adherent with PAP therapy and PAP continues to benefit patient and remains necessary for control of his sleep apnea. 1. Sleep Apnea - He admits he is having some issues adjusting to the device. He frequently moves in his sleep and notes it has been uncomfortable trying to keep the device on. He would like to allow for more time to adjust to the device before moving forward with a titrations study. Follow up in 3 months     * Supplies ordered - full face mask and heated tubing    Orders Placed This Encounter    AMB SUPPLY ORDER     Diagnosis: (G47.33) SAGE (obstructive sleep apnea)  (primary encounter diagnosis)     Replacement Supplies for Positive Airway Pressure Therapy Device:   Duration of need: 99 months.  Nasal Pillows Combo Mask (Replace) 2 per month.  Nasal Pillows (Replace) 2 per month.  Nasal Cushion (Replace) 2 per month.  Nasal Interface Mask 1 every 3 months.  Full Face Mask 1 every 3 months.  Full Face Mask Cushion 1 per month.  Headgear 1 every 6 months.    Positive Airway Pressure chinstrap 1 every 6 months.  Tubing with heating element 1 every 3 months.  Filter(s) Disposable 2 per month.  Filter(s) Non-Disposable 1 every 6 months. .   [address] for Humidifier (Replace) 1 every 6 months. Victor Hugo Spicer FNP-BC NPI: [id number]    Electronically signed. Date:- 06/17/22     *  Counseling was provided regarding the importance of regular PAP use with emphasis on ensuring sufficient total sleep time, proper sleep hygiene, and safe driving. * Re-enforced proper and regular cleaning for the device. * He was asked to contact our office for any problems regarding PAP therapy. 2. Hypertension -  continue on his current regimen, he will continue to monitor his BP and follow up with his PMD for reevaluation/adjustment of medications if warranted. I have reviewed the relationship between hypertension as it relates to sleep-disordered breathing. 3. Recommended a dedicated weight loss program through appropriate diet and exercise regimen as significant weight reduction has been shown to reduce severity of obstructive sleep apnea. SUBJECTIVE/OBJECTIVE:    He  is seen today for follow up on PAP device and reports few problems using the device. The following concerns reviewed:    Drowsiness no Problems exhaling no   Snoring no Forget to put on no   Mask Comfortable sometimes Can't fall asleep no   Dry Mouth no Mask falls off no   Air Leaking no Frequent awakenings yes     He admits that his sleep has improved on PAP therapy using full face mask and heated tubing. Review of device download indicated:  Auto pressure: 5-14 cmH2O; Max Pressure: 13.9 cmh2O; 95th percentile pressure: 13.8 cmH2O  95th percentile leak: 3.6 L/min  % Used Days >= 4 hours: 80. Avg hours used:  5 hours. Therapy Apnea Index averaged over PAP use: 3.9 /hr which reflects improved sleep breathing condition.     [place] Sleepiness Score: (P) 6 and Modified F.O.S.Q. Score Total / 2: (P) 18.5 which reflects improved sleep quality over therapy time. Sleep Review of Systems: notable for Negative difficulty falling asleep; Positive awakenings at night; Negative early morning headaches; Negative memory problems; Negative concentration issues; Negative chest pain; Negative shortness of breath; Negative significant joint pain at night; Negative significant muscle pain at night; Negative rashes or itching; Negative heartburn; Negative significant mood issues    Vitals reported by patient   Patient-Reported Vitals 6/10/2020   Patient-Reported Weight 178 lb   Patient-Reported Pulse 60   Patient-Reported Systolic  174   Patient-Reported Diastolic 85      Calculated BMI 28    Physical Exam limited due to audio only visit     Scot Escobar, was evaluated through a synchronous (real-time) audio-video encounter. The patient (or guardian if applicable) is aware that this is a billable service, which includes applicable co-pays. This Virtual Visit was conducted with patient's (and/or legal guardian's) consent. The visit was conducted pursuant to the emergency declaration under the [address] [place] [address] authority and the [employer] and [employer] Act. Patient identification was verified, and a caregiver was present when appropriate. The patient was located at: Home: [address] [place] [id number]  The provider was located at: Facility (Appt Department): [address] [place] [id number]    Consent:  He and/or his healthcare decision maker is aware that this patient-initiated Telehealth encounter is the equivalent to a face to face encounter in the sleep disorder center and has provided verbal consent to proceed: Yes    * He was asked to contact our office for any problems regarding PAP therapy. * Counseling was provided regarding the importance of regular PAP use and on proper sleep hygiene and safe driving.     * Re-enforced proper and regular cleaning for the device. All of his questions were addressed. Jaaml Bañuelos, who was evaluated through a patient-initiated, synchronous (real-time) audio only encounter, and/or her healthcare decision maker, is aware that it is a billable service, with coverage as determined by his insurance carrier. He provided verbal consent to proceed: Yes. He has not had a related appointment within my department in the past 7 days or scheduled within the next 24 hours.     Total Time: minutes: 11-20 minutes    Cisco Lara NP

## 2022-06-17 ENCOUNTER — OFFICE VISIT (OUTPATIENT)
Dept: INTERNAL MEDICINE CLINIC | Age: 72
End: 2022-06-17
Payer: MEDICARE

## 2022-06-17 ENCOUNTER — VIRTUAL VISIT (OUTPATIENT)
Dept: SLEEP MEDICINE | Age: 72
End: 2022-06-17

## 2022-06-17 VITALS
SYSTOLIC BLOOD PRESSURE: 132 MMHG | TEMPERATURE: 97.3 F | RESPIRATION RATE: 16 BRPM | WEIGHT: 179.6 LBS | OXYGEN SATURATION: 100 % | HEART RATE: 54 BPM | DIASTOLIC BLOOD PRESSURE: 76 MMHG | HEIGHT: 67 IN | BODY MASS INDEX: 28.19 KG/M2

## 2022-06-17 DIAGNOSIS — I10 PRIMARY HYPERTENSION: ICD-10-CM

## 2022-06-17 DIAGNOSIS — I47.1 ATRIAL TACHYCARDIA (HCC): ICD-10-CM

## 2022-06-17 DIAGNOSIS — Z01.818 PREOP EXAM FOR INTERNAL MEDICINE: ICD-10-CM

## 2022-06-17 DIAGNOSIS — M17.12 PRIMARY OSTEOARTHRITIS OF LEFT KNEE: Primary | ICD-10-CM

## 2022-06-17 DIAGNOSIS — M17.0 PRIMARY OSTEOARTHRITIS OF BOTH KNEES: ICD-10-CM

## 2022-06-17 DIAGNOSIS — G47.33 OSA (OBSTRUCTIVE SLEEP APNEA): Primary | ICD-10-CM

## 2022-06-17 DIAGNOSIS — R73.01 FASTING HYPERGLYCEMIA: ICD-10-CM

## 2022-06-17 LAB
ABO + RH BLD: NORMAL
ANION GAP SERPL CALC-SCNC: 6 MMOL/L (ref 5–15)
APPEARANCE UR: CLEAR
BACTERIA URNS QL MICRO: NEGATIVE /HPF
BASOPHILS # BLD: 0.1 K/UL (ref 0–0.1)
BASOPHILS NFR BLD: 1 % (ref 0–1)
BILIRUB UR QL: NEGATIVE
BLOOD BANK CMNT PATIENT-IMP: NORMAL
BLOOD GROUP ANTIBODIES SERPL: NORMAL
BUN SERPL-MCNC: 18 MG/DL (ref 6–20)
BUN/CREAT SERPL: 19 (ref 12–20)
CALCIUM SERPL-MCNC: 9.2 MG/DL (ref 8.5–10.1)
CHLORIDE SERPL-SCNC: 104 MMOL/L (ref 97–108)
CO2 SERPL-SCNC: 28 MMOL/L (ref 21–32)
COLOR UR: NORMAL
CREAT SERPL-MCNC: 0.94 MG/DL (ref 0.7–1.3)
DIFFERENTIAL METHOD BLD: NORMAL
EOSINOPHIL # BLD: 0.1 K/UL (ref 0–0.4)
EOSINOPHIL NFR BLD: 2 % (ref 0–7)
EPITH CASTS URNS QL MICRO: NORMAL /LPF
ERYTHROCYTE [DISTWIDTH] IN BLOOD BY AUTOMATED COUNT: 13.7 % (ref 11.5–14.5)
EST. AVERAGE GLUCOSE BLD GHB EST-MCNC: 114 MG/DL
GLUCOSE SERPL-MCNC: 85 MG/DL (ref 65–100)
GLUCOSE UR STRIP.AUTO-MCNC: NEGATIVE MG/DL
HBA1C MFR BLD: 5.6 % (ref 4–5.6)
HCT VFR BLD AUTO: 44.2 % (ref 36.6–50.3)
HGB BLD-MCNC: 14.6 G/DL (ref 12.1–17)
HGB UR QL STRIP: NEGATIVE
HYALINE CASTS URNS QL MICRO: NORMAL /LPF (ref 0–5)
IMM GRANULOCYTES # BLD AUTO: 0 K/UL (ref 0–0.04)
IMM GRANULOCYTES NFR BLD AUTO: 0 % (ref 0–0.5)
INR PPP: 1.3 (ref 0.9–1.1)
KETONES UR QL STRIP.AUTO: NEGATIVE MG/DL
LEUKOCYTE ESTERASE UR QL STRIP.AUTO: NEGATIVE
LYMPHOCYTES # BLD: 1.3 K/UL (ref 0.8–3.5)
LYMPHOCYTES NFR BLD: 19 % (ref 12–49)
MCH RBC QN AUTO: 30.2 PG (ref 26–34)
MCHC RBC AUTO-ENTMCNC: 33 G/DL (ref 30–36.5)
MCV RBC AUTO: 91.3 FL (ref 80–99)
MONOCYTES # BLD: 0.7 K/UL (ref 0–1)
MONOCYTES NFR BLD: 11 % (ref 5–13)
NEUTS SEG # BLD: 4.5 K/UL (ref 1.8–8)
NEUTS SEG NFR BLD: 67 % (ref 32–75)
NITRITE UR QL STRIP.AUTO: NEGATIVE
NRBC # BLD: 0 K/UL (ref 0–0.01)
NRBC BLD-RTO: 0 PER 100 WBC
PH UR STRIP: 5.5 [PH] (ref 5–8)
PLATELET # BLD AUTO: 310 K/UL (ref 150–400)
PMV BLD AUTO: 11.2 FL (ref 8.9–12.9)
POTASSIUM SERPL-SCNC: 4.7 MMOL/L (ref 3.5–5.1)
PROT UR STRIP-MCNC: NEGATIVE MG/DL
PROTHROMBIN TIME: 13 SEC (ref 9–11.1)
RBC # BLD AUTO: 4.84 M/UL (ref 4.1–5.7)
RBC #/AREA URNS HPF: NORMAL /HPF (ref 0–5)
SODIUM SERPL-SCNC: 138 MMOL/L (ref 136–145)
SP GR UR REFRACTOMETRY: 1.02 (ref 1–1.03)
SPECIMEN EXP DATE BLD: NORMAL
UROBILINOGEN UR QL STRIP.AUTO: 0.2 EU/DL (ref 0.2–1)
WBC # BLD AUTO: 6.8 K/UL (ref 4.1–11.1)
WBC URNS QL MICRO: NORMAL /HPF (ref 0–4)

## 2022-06-17 PROCEDURE — 3017F COLORECTAL CA SCREEN DOC REV: CPT | Performed by: INTERNAL MEDICINE

## 2022-06-17 PROCEDURE — G8510 SCR DEP NEG, NO PLAN REQD: HCPCS | Performed by: INTERNAL MEDICINE

## 2022-06-17 PROCEDURE — G8417 CALC BMI ABV UP PARAM F/U: HCPCS | Performed by: INTERNAL MEDICINE

## 2022-06-17 PROCEDURE — G8417 CALC BMI ABV UP PARAM F/U: HCPCS | Performed by: NURSE PRACTITIONER

## 2022-06-17 PROCEDURE — 1123F ACP DISCUSS/DSCN MKR DOCD: CPT | Performed by: NURSE PRACTITIONER

## 2022-06-17 PROCEDURE — G8536 NO DOC ELDER MAL SCRN: HCPCS | Performed by: NURSE PRACTITIONER

## 2022-06-17 PROCEDURE — 1101F PT FALLS ASSESS-DOCD LE1/YR: CPT | Performed by: NURSE PRACTITIONER

## 2022-06-17 PROCEDURE — G8752 SYS BP LESS 140: HCPCS | Performed by: INTERNAL MEDICINE

## 2022-06-17 PROCEDURE — G8510 SCR DEP NEG, NO PLAN REQD: HCPCS | Performed by: NURSE PRACTITIONER

## 2022-06-17 PROCEDURE — 99214 OFFICE O/P EST MOD 30 MIN: CPT | Performed by: INTERNAL MEDICINE

## 2022-06-17 PROCEDURE — 1101F PT FALLS ASSESS-DOCD LE1/YR: CPT | Performed by: INTERNAL MEDICINE

## 2022-06-17 PROCEDURE — G8427 DOCREV CUR MEDS BY ELIG CLIN: HCPCS | Performed by: NURSE PRACTITIONER

## 2022-06-17 PROCEDURE — 99213 OFFICE O/P EST LOW 20 MIN: CPT | Performed by: NURSE PRACTITIONER

## 2022-06-17 PROCEDURE — G8754 DIAS BP LESS 90: HCPCS | Performed by: NURSE PRACTITIONER

## 2022-06-17 PROCEDURE — G8754 DIAS BP LESS 90: HCPCS | Performed by: INTERNAL MEDICINE

## 2022-06-17 PROCEDURE — 3017F COLORECTAL CA SCREEN DOC REV: CPT | Performed by: NURSE PRACTITIONER

## 2022-06-17 PROCEDURE — G8536 NO DOC ELDER MAL SCRN: HCPCS | Performed by: INTERNAL MEDICINE

## 2022-06-17 PROCEDURE — G8427 DOCREV CUR MEDS BY ELIG CLIN: HCPCS | Performed by: INTERNAL MEDICINE

## 2022-06-17 PROCEDURE — G8752 SYS BP LESS 140: HCPCS | Performed by: NURSE PRACTITIONER

## 2022-06-17 NOTE — PATIENT INSTRUCTIONS
[address] [place], [address]  Tel.  [phone]  Fax. 100 [facility] [address]  [place], [address] [facility]  Tel.  [phone]  Fax. [phone] [address]  Tel.  [phone]  Fax. [phone]     Learning About CPAP for Sleep Apnea  What is CPAP? CPAP is a small machine that you use at home every night while you sleep. It increases air pressure in your throat to keep your airway open. When you have sleep apnea, this can help you sleep better so you feel much better. CPAP stands for \"continuous positive airway pressure. \"  The CPAP machine will have one of the following:  · A mask that covers your nose and mouth  · Prongs that fit into your nose  · A mask that covers your nose only, the most common type. This type is called NCPAP. The N stands for \"nasal.\"  Why is it done? CPAP is usually the best treatment for obstructive sleep apnea. It is the first treatment choice and the most widely used. Your doctor may suggest CPAP if you have:  · Moderate to severe sleep apnea. · Sleep apnea and coronary artery disease (CAD) or heart failure. How does it help? · CPAP can help you have more normal sleep, so you feel less sleepy and more alert during the daytime. · CPAP may help keep heart failure or other heart problems from getting worse. · NCPAP may help lower your blood pressure. · If you use CPAP, your bed partner may also sleep better because you are not snoring or restless. What are the side effects? Some people who use CPAP have:  · A dry or stuffy nose and a sore throat. · Irritated skin on the face. · Sore eyes. · Bloating. If you have any of these problems, work with your doctor to fix them. Here are some things you can try:  · Be sure the mask or nasal prongs fit well. · See if your doctor can adjust the pressure of your CPAP. · If your nose is dry, try a humidifier.   · If your nose is runny or stuffy, try decongestant medicine or a steroid nasal spray. If these things do not help, you might try a different type of machine. Some machines have air pressure that adjusts on its own. Others have air pressures that are different when you breathe in than when you breathe out. This may reduce discomfort caused by too much pressure in your nose. Where can you learn more? Go to i-Human Patients.be  Enter Stephanie Sanders in the search box to learn more about \"Learning About CPAP for Sleep Apnea. \"   © 5227-7128 Healthwise, Incorporated. Care instructions adapted under license by [facility] (which disclaims liability or warranty for this information). This care instruction is for use with your licensed healthcare professional. If you have questions about a medical condition or this instruction, always ask your healthcare professional. [address] any warranty or liability for your use of this information. Content Version: 5.7.66450; Last Revised: January 11, 2010  PROPER SLEEP HYGIENE    What to avoid  · Do not have drinks with caffeine, such as coffee or black tea, for 8 hours before bed. · Do not smoke or use other types of tobacco near bedtime. Nicotine is a stimulant and can keep you awake. · Avoid drinking alcohol late in the evening, because it can cause you to wake in the middle of the night. · Do not eat a big meal close to bedtime. If you are hungry, eat a light snack. · Do not drink a lot of water close to bedtime, because the need to urinate may wake you up during the night. · Do not read or watch TV in bed. Use the bed only for sleeping and sexual activity. What to try  · Go to bed at the same time every night, and wake up at the same time every morning. Do not take naps during the day. · Keep your bedroom quiet, dark, and cool. · Get regular exercise, but not within 3 to 4 hours of your bedtime. .  · Sleep on a comfortable pillow and mattress.   · If watching the clock makes you anxious, turn it facing away from you so you cannot see the time. · If you worry when you lie down, start a worry book. Well before bedtime, write down your worries, and then set the book and your concerns aside. · Try meditation or other relaxation techniques before you go to bed. · If you cannot fall asleep, get up and go to another room until you feel sleepy. Do something relaxing. Repeat your bedtime routine before you go to bed again. · Make your house quiet and calm about an hour before bedtime. Turn down the lights, turn off the TV, log off the computer, and turn down the volume on music. This can help you relax after a busy day. Drowsy Driving: The [facility] cites drowsiness as a causing factor in more than 413,050 police reported crashes annually, resulting in 76,000 injuries and 1,500 deaths. Other surveys suggest 55% of people polled have driven while drowsy in the past year, 23% had fallen asleep but not crashed, 3% crashed, and 2% had and accident due to drowsy driving. Who is at risk? Young Drivers: One study of drowsy driving accidents states that 55% of the drivers were under 25 years. Of those, 75% were male. Shift Workers and Travelers: People who work overnight or travel across time zones frequently are at higher risk of experiencing Circadian Rhythm Disorders. They are trying to work and function when their body is programed to sleep. Sleep Deprived: Lack of sleep has a serious impact on your ability to pay attention or focus on a task. Consistently getting less than the average of 8 hours your body needs creates partial or cumulative sleep deprivation. Untreated Sleep Disorders: Sleep Apnea, Narcolepsy, R.L.S., and other sleep disorders (untreated) prevent a person from getting enough restful sleep. This leads to excessive daytime sleepiness and increases the risk for drowsy driving accidents by up to 7 times.   Medications / Alcohol: Even over the counter medications can cause drowsiness. Medications that impair a drivers attention should have a warning label. Alcohol naturally makes you sleepy and on its own can cause accidents. Combined with excessive drowsiness its effects are amplified. Signs of Drowsy Driving:   * You don't remember driving the last few miles   * You may drift out of your miguel   * You are unable to focus and your thoughts wander   * You may yawn more often than normal   * You have difficulty keeping your eyes open / nodding off   * Missing traffic signs, speeding, or tailgating  Prevention-   Good sleep hygiene, lifestyle and behavioral choices have the most impact on drowsy driving. There is no substitute for sleep and the average person requires 8 hours nightly. If you find yourself driving drowsy, stop and sleep. Consider the sleep hygiene tips provided during your visit as well. Medication Refill Policy: Refills for all medications require 1 week advance notice. Please have your pharmacy fax a refill request. We are unable to fax, or call in \"controled substance\" medications and you will need to pick these prescriptions up from our office. PacerPro Activation    Thank you for requesting access to PacerPro. Please follow the instructions below to securely access and download your online medical record. PacerPro allows you to send messages to your doctor, view your test results, renew your prescriptions, schedule appointments, and more. How Do I Sign Up? 1. In your internet browser, go to [email or web address]. 2. Click on the First Time User? Click Here link in the Sign In box. You will see the New Member Sign Up page. 3. Enter your PacerPro Access Code exactly as it appears below. You will not need to use this code after youve completed the sign-up process. If you do not sign up before the expiration date, you must request a new code. PacerPro Access Code:  Activation code not generated  Current Survature Status: Active (This is the date your Survature access code will )    4. Enter the last four digits of your Social Security Number (xxxx) and Date of Birth (mm/dd/yyyy) as indicated and click Submit. You will be taken to the next sign-up page. 5. Create a [email or web address] ID. This will be your Survature login ID and cannot be changed, so think of one that is secure and easy to remember. 6. Create a Survature password. You can change your password at any time. 7. Enter your Password Reset Question and Answer. This can be used at a later time if you forget your password. 8. Enter your e-mail address. You will receive e-mail notification when new information is available in [address] 9. Click Sign Up. You can now view and download portions of your medical record. 10. Click the Download Summary menu link to download a portable copy of your medical information. Additional Information    If you have questions, please call [phone]. Remember, Survature is NOT to be used for urgent needs. For medical emergencies, dial 911.

## 2022-06-17 NOTE — PROGRESS NOTES
Preoperative Evaluation    Date of Exam: 2022    Radha Pineda is a 74 y.o. male (:1950) who presents for preoperative evaluation. Procedure/Surgery:Left Total Knee Replacement  Date of Procedure/Surgery: 2022  Surgeon: Sierra Gates MD  [facility]  Primary Physician: Zoey Del Cid MD  Latex Allergy: no    Medical History:     Past Medical History:   Diagnosis Date    A-fib [facility])     Arrhythmia 2021    Cardioverted     JINA (generalized anxiety disorder) 2019    Hypertension     OA (osteoarthritis) 2011    Sleep apnea      Allergies:   No Known Allergies   Medications:     Current Outpatient Medications   Medication Sig    Allergy Relief, cetirizine, 10 mg tablet Take 1 tablet by mouth nightly (Patient taking differently: daily as needed.)    hydroCHLOROthiazide (HYDRODIURIL) 25 mg tablet Take 1 tablet by mouth once daily    amiodarone (PACERONE) 100 mg tablet Take 1 Tablet by mouth daily. (Patient taking differently: Take 50 mg by mouth daily.)    losartan (COZAAR) 50 mg tablet Take 1 Tablet by mouth daily.  rivaroxaban (Xarelto) 20 mg tab tablet Take 1 Tablet by mouth daily.  ascorbic acid, vitamin C, (Vitamin C) 500 mg tablet Take 500 mg by mouth daily.  zinc 50 mg tab tablet Take 50 mg by mouth daily.  b complex vitamins tablet Take 2 Tablets by mouth daily. No current facility-administered medications for this visit. Surgical History:     Past Surgical History:   Procedure Laterality Date    HX CATARACT REMOVAL Left     left     HX CATARACT REMOVAL Right     HX HEENT Right     Yag Capsulotomy    HX HIP REPLACEMENT Left 1999    HX HIP REPLACEMENT Right  and ?     Right x2    AL ANESTH,KNEE AREA SURGERY  1966     Social History:     Social History     Socioeconomic History    Marital status:    Occupational History     Employer: [employer]   Tobacco Use    Smoking status: Never Smoker    Smokeless tobacco: Never Used   Vaping Use    Vaping Use: Never used   Substance and Sexual Activity    Alcohol use: Yes     Alcohol/week: 2.0 standard drinks     Types: 2 Standard drinks or equivalent per week     Comment: socially     Drug use: No    Sexual activity: Yes     Partners: Female     Birth control/protection: None       Recent use of: Xarelto    Tetanus up to date: last tetanus booster within 10 years      Anesthesia Complications: None  History of abnormal bleeding : None  History of Blood Transfusions: no  Health Care Directive or Living Will: no    REVIEW OF SYSTEMS:  A comprehensive review of systems was negative except for that written in the HPI. EXAM:   Visit Vitals  /76   Pulse (!) 54   Temp 97.3 °F (36.3 °C) (Temporal)   Resp 16   Ht 5' 7\" (1.702 m)   Wt 179 lb 9.6 oz (81.5 kg)   SpO2 100%   BMI 28.13 kg/m²     General appearance - alert, well appearing, and in no distress  Ears - bilateral TM's and external ear canals normal  Mouth - mucous membranes moist, pharynx normal without lesions  Neck - supple, no significant adenopathy  Lymphatics - no palpable lymphadenopathy, no hepatosplenomegaly  Chest - clear to auscultation, no wheezes, rales or rhonchi, symmetric air entry  Heart - irregularly irregular rhythm  Abdomen - soft, nontender, nondistended, no masses or organomegaly  Neurological - alert, oriented, normal speech, no focal findings or movement disorder noted, motor and sensory grossly normal bilaterally  Musculoskeletal - no joint tenderness, deformity or swelling, abnormal exam of left knee with DJD changes. Extremities - peripheral pulses normal, no pedal edema, no clubbing or cyanosis      DIAGNOSTICS:   1. EKG: EKG FINDINGS - Pending per cardiology    2. Labs:   Lab Results   Component Value Date/Time    ALT (SGPT) 28 06/13/2022 11:07 AM    Alk.  phosphatase 52 06/13/2022 11:07 AM    Bilirubin, direct 0.2 06/13/2022 11:07 AM Bilirubin, total 0.6 06/13/2022 11:07 AM    Albumin 3.9 06/13/2022 11:07 AM    Protein, total 6.8 06/13/2022 11:07 AM    PLATELET 764 78/09/2923 09:58 AM       IMPRESSION:     No contraindications to planned surgery    Diagnoses and all orders for this visit:    1. Primary hypertension-blood pressure well controlled on current meds. 2. Primary osteoarthritis of both knees    3. Atrial tachycardia (HCC)-appears to be in atrial fib now. Will see cardiology for follow-up  -     CBC WITH AUTOMATED DIFF; Future  -     METABOLIC PANEL, BASIC; Future  -     PROTHROMBIN TIME + INR; Future  -     URINALYSIS W/MICROSCOPIC; Future  -     HEMOGLOBIN A1C WITH EAG; Future  -     TYPE & SCREEN; Future  -     CULTURE, MRSA; Future    4. Primary osteoarthritis of left knee-appears to be an adequate candidate for anesthesia. -     CBC WITH AUTOMATED DIFF; Future  -     METABOLIC PANEL, BASIC; Future  -     PROTHROMBIN TIME + INR; Future  -     URINALYSIS W/MICROSCOPIC; Future  -     HEMOGLOBIN A1C WITH EAG; Future  -     TYPE & SCREEN; Future  -     CULTURE, MRSA; Future    5. Preop exam for internal medicine  -     CBC WITH AUTOMATED DIFF; Future  -     METABOLIC PANEL, BASIC; Future  -     PROTHROMBIN TIME + INR; Future  -     URINALYSIS W/MICROSCOPIC; Future  -     HEMOGLOBIN A1C WITH EAG; Future  -     TYPE & SCREEN; Future  -     CULTURE, MRSA; Future    6. Fasting hyperglycemia-repeat A1c today.  -     HEMOGLOBIN A1C WITH EAG;  Future                Madai Mancera MD   6/17/2022

## 2022-06-18 LAB
BACTERIA SPEC CULT: NORMAL
BACTERIA SPEC CULT: NORMAL
SERVICE CMNT-IMP: NORMAL

## 2022-06-20 ENCOUNTER — OFFICE VISIT (OUTPATIENT)
Dept: CARDIOLOGY CLINIC | Age: 72
End: 2022-06-20
Payer: MEDICARE

## 2022-06-20 VITALS
HEART RATE: 51 BPM | DIASTOLIC BLOOD PRESSURE: 80 MMHG | RESPIRATION RATE: 16 BRPM | WEIGHT: 180 LBS | BODY MASS INDEX: 28.25 KG/M2 | OXYGEN SATURATION: 100 % | SYSTOLIC BLOOD PRESSURE: 120 MMHG | HEIGHT: 67 IN

## 2022-06-20 DIAGNOSIS — I48.91 ATRIAL FIBRILLATION, UNSPECIFIED TYPE (HCC): Primary | ICD-10-CM

## 2022-06-20 PROCEDURE — G8417 CALC BMI ABV UP PARAM F/U: HCPCS | Performed by: SPECIALIST

## 2022-06-20 PROCEDURE — 93000 ELECTROCARDIOGRAM COMPLETE: CPT | Performed by: SPECIALIST

## 2022-06-20 PROCEDURE — G8536 NO DOC ELDER MAL SCRN: HCPCS | Performed by: SPECIALIST

## 2022-06-20 PROCEDURE — 1123F ACP DISCUSS/DSCN MKR DOCD: CPT | Performed by: SPECIALIST

## 2022-06-20 PROCEDURE — G8427 DOCREV CUR MEDS BY ELIG CLIN: HCPCS | Performed by: SPECIALIST

## 2022-06-20 PROCEDURE — G8510 SCR DEP NEG, NO PLAN REQD: HCPCS | Performed by: SPECIALIST

## 2022-06-20 PROCEDURE — 99214 OFFICE O/P EST MOD 30 MIN: CPT | Performed by: SPECIALIST

## 2022-06-20 PROCEDURE — G8754 DIAS BP LESS 90: HCPCS | Performed by: SPECIALIST

## 2022-06-20 PROCEDURE — 1101F PT FALLS ASSESS-DOCD LE1/YR: CPT | Performed by: SPECIALIST

## 2022-06-20 PROCEDURE — 3017F COLORECTAL CA SCREEN DOC REV: CPT | Performed by: SPECIALIST

## 2022-06-20 PROCEDURE — G8752 SYS BP LESS 140: HCPCS | Performed by: SPECIALIST

## 2022-06-20 NOTE — PROGRESS NOTES
Visit Vitals  /80   Pulse (!) 51   Resp 16   Ht 5' 7\" (1.702 m)   Wt 180 lb (81.6 kg)   SpO2 100%   BMI 28.19 kg/m²

## 2022-06-20 NOTE — PROGRESS NOTES
[address] [facility]                                                            OFFICE NOTE        Manny Rodriguez M.D.,SALO ELLEN ARMIJO   1950  [id number]    Date/Time:  6/20/202210:09 AM            SUBJECTIVE:    Is doing well he denies any chest pain or chest discomfort similar no particular shortness of breath reported. No palpitation presyncopal syncopal episode dizziness. Only complaint is feeling somewhat fatigued. He remains nonetheless relatively active his main complaint today is knee pain is planning to undergo total knee replacement in couple weeks. Assessment/Plan  1. Paroxysmal atrial fibrillation: He remains in normal sinus rhythm    His EKG today reveals a sinus bradycardia rate of 51 minimal nonspecific interventricular conduction delay occasional PAC no significant ST-T changes. Status post JOJO cardioversion 5/8/2022 with restoration of normal sinus rhythm.     To be noted the patient did have a very slow heart rate approximately 30 bpm soon after cardioversion.     Continue with half of the dose of amiodarone.     The patient is now off metoprolol completely.     Continue with Xarelto no untoward effects thus far.     Discussed the potential side effects of amiodarone.     Proceed with liver function test and TSH  in 12/ 2022. LFTs TSH and chest x-ray are all okay as of June 2022.     We have discussed the potential for underlying conduction disease and the potential need for pacemaker in the future. He will let me know if any symptoms. Continue continue with yearly eye examination.     2.  Cardiomyopathy: I suspect this was related to is atrial fibrillation rapid ventricular response. Echocardiogram last office visit with normal ejection fraction and this was been discussed with the patient.   Now off metoprolol and only on losartan this will be continued.     3. CAD: Previous abnormal calcium score. Nuclear stress test in December 2021 essentially with no ischemia.     4. Hypertension: Well-controlled5. Hyperlipidemia: Closely followed by his primary care physician. 5.  Preop: The patient is completely asymptomatic cardiac wise. Last nuclear stress test in December 2021 with no ischemia. His EKG today shows sinus bradycardia but no significant ST-T changes. At this time the patient is at an acceptable cardiac risk to proceed with left total knee replacement as planned. May hold the Xarelto for 48 hours prior to surgery if needed from the surgical standpoint. Otherwise I will see him back in 6 months with liver function test and TSH 1 week prior.             HPI   72 y. o. male. Patient with h/o HTN seen here for evaluation of HTN and bradycardia  ECHO on 4/17:Left ventricle: Systolic function was normal. Ejection fraction was  estimated in the range of 55 % to 60 %. There were no regional wall motion  abnormalities. Mitral valve: There was mild regurgitation. Aortic valve: Normal valve structure. The valve was trileaflet. Leaflets  exhibited mildly increased thickness, normal cuspal separation, and  sclerosis. Tricuspid valve: There was mild regurgitation. HOLTER on 5/9/17: sinus bradycardia  with average of 58 frequent pacs and rare pvcs, 8 episodes of possible PAT with the longest of 31 beats at 165     Ca score on 6/19:Left main coronary artery: 0   Left anterior descending coronary artery: 170  Left circumflex coronary artery: 0  Right coronary artery: 33     Total calcium score: 203      NORMAL STRESS ECHO on 7/19 but htn response                 CARDIAC STUDIES        02/09/22    ECHO ADULT FOLLOW-UP OR LIMITED 03/09/2022 3/9/2022    Interpretation Summary    Left Ventricle: Left ventricle size is normal. Normal wall thickness. Normal wall motion.  Normal left ventricular systolic function with a visually estimated EF of 55 - 60%.   Left Atrium: Left atrium is moderately dilated.   Right Atrium: Right atrium is moderately dilated.   Aorta: Mildly dilated aortic root. Mildly dilated ascending aorta. Signed by: Jones Castorena MD on 3/9/2022 12:32 PM            12/09/21    NUCLEAR CARDIAC STRESS TEST 12/13/2021 12/14/2021    Interpretation Summary  · ECG: Resting ECG demonstrates atrial fibrillation. Signed by: Jones Castorena MD on 12/13/2021  2:46 PM                    EKG Results     Procedure 720 Value Units Date/Time    AMB POC EKG ROUTINE W/ 12 LEADS, INTER & REP [[id number]] Resulted: 06/20/22 0927    Order Status: Completed Updated: 06/20/22 0929              IMAGING      MRI Results (most recent):  No results found for this or any previous visit. CT Results (most recent):  Results from Hospital Encounter encounter on 06/10/19    CT HEART W/O CONT WITH CALCIUM    Narrative  EXAM:  CT HEART W/O CONT WITH CALCIUM    INDICATION:  Preventive. Screening. COMPARISON: None. TECHNIQUE: CT of the heart was performed without contrast. Quantitative coronary  artery CT calcium scoring was performed. CT dose reduction was achieved through  use of a standardized protocol tailored for this examination and automatic  exposure control for dose modulation. Adaptive statistical iterative  reconstruction (ASIR) was utilized. FINDINGS:  The coronary calcium score in each vessel is as follows:    Left main coronary artery: 0  Left anterior descending coronary artery: 170  Left circumflex coronary artery: 0  Right coronary artery: 33    Total calcium score: 203    Calcium score interpretation:  0-0 = No evidence of coronary artery disease (CAD). 1-10 = Minimal evidence of CAD   = Mild evidence of CAD  101-400 = Moderate evidence of CAD  >400 = Extensive evidence of CAD    Your score of 203 places you in the 30 percentile rank.  That means that 70% of  men from 67-66 years old will have a higher calcium score than you.    Chest CT findings: The visualized lungs are clear. The entire lung fields are  not imaged on this examination. Impression  IMPRESSION: Total calcium score of 203. Moderate coronary artery disease. XR Results (most recent):  Results from Hospital Encounter encounter on 06/13/22    XR CHEST PA LAT    Narrative  Exam:  2 view chest    Indication: Amiodarone therapy. COMPARISON: 3/1/2018    PA and lateral views demonstrate normal heart size. There is no acute process in  the lung fields. The osseous structures are unremarkable. Calcific densities  projecting inferior to the right glenohumeral joint are likely loose bodies in  the joint capsule. Impression  No acute process. Past Medical History:   Diagnosis Date    [facility]fib [facility])     Arrhythmia 11/2021    Cardioverted     JINA (generalized anxiety disorder) 12/9/2019    Hypertension     OA (osteoarthritis) 8/19/2011    Sleep apnea      Past Surgical History:   Procedure Laterality Date    HX CATARACT REMOVAL Left 1993    left     HX CATARACT REMOVAL Right 2018    HX HEENT Right 2022    Yag Capsulotomy    HX HIP REPLACEMENT Left 01/1999    HX HIP REPLACEMENT Right 2004 and 2006? Right x2    CA ANESTH,KNEE AREA SURGERY  1966     Social History     Tobacco Use    Smoking status: Never Smoker    Smokeless tobacco: Never Used   Vaping Use    Vaping Use: Never used   Substance Use Topics    Alcohol use:  Yes     Alcohol/week: 2.0 standard drinks     Types: 2 Standard drinks or equivalent per week     Comment: socially     Drug use: No     Family History   Problem Relation Age of Onset    Lung Disease Mother     Heart Disease Father     Hypertension Sister     Hypertension Son     Hypertension Daughter     Hypertension Daughter      No Known Allergies      Visit Vitals  /80   Pulse (!) 51   Resp 16   Ht 5' 7\" (1.702 m)   Wt 180 lb (81.6 kg)   SpO2 100%   BMI 28.19 kg/m²         Last 3 Recorded Weights in this Encounter 06/20/22 0922   Weight: 180 lb (81.6 kg)            Review of Systems:   Pertinent items are noted in the History of Present Illness. Visit Vitals  /80   Pulse (!) 51   Resp 16   Ht 5' 7\" (1.702 m)   Wt 180 lb (81.6 kg)   SpO2 100%   BMI 28.19 kg/m²     General Appearance:  Well developed, well nourished,alert and oriented x 3, and individual in no acute distress. Ears/Nose/Mouth/Throat:   Hearing grossly normal.         Neck: Supple. Chest:   Lungs clear to auscultation bilaterally. Cardiovascular:  Regular rate and rhythm, S1, S2 normal, no murmur. Abdomen:   Soft, non-tender, bowel sounds are active. Extremities: No edema bilaterally. Skin: Warm and dry. Current Outpatient Medications on File Prior to Visit   Medication Sig Dispense Refill    Allergy Relief, cetirizine, 10 mg tablet Take 1 tablet by mouth nightly (Patient taking differently: daily as needed.) 90 Tablet 0    hydroCHLOROthiazide (HYDRODIURIL) 25 mg tablet Take 1 tablet by mouth once daily 90 Tablet 1    amiodarone (PACERONE) 100 mg tablet Take 1 Tablet by mouth daily. (Patient taking differently: Take 50 mg by mouth daily.) 90 Tablet 1    losartan (COZAAR) 50 mg tablet Take 1 Tablet by mouth daily. 90 Tablet 1    rivaroxaban (Xarelto) 20 mg tab tablet Take 1 Tablet by mouth daily. 28 Tablet 0    ascorbic acid, vitamin C, (Vitamin C) 500 mg tablet Take 500 mg by mouth daily.  zinc 50 mg tab tablet Take 50 mg by mouth daily.  b complex vitamins tablet Take 2 Tablets by mouth daily. No current facility-administered medications on file prior to visit. Casey Hopkins had no medications administered during this visit.      Current Outpatient Medications   Medication Sig    Allergy Relief, cetirizine, 10 mg tablet Take 1 tablet by mouth nightly (Patient taking differently: daily as needed.)    hydroCHLOROthiazide (HYDRODIURIL) 25 mg tablet Take 1 tablet by mouth once daily    amiodarone (PACERONE) 100 mg tablet Take 1 Tablet by mouth daily. (Patient taking differently: Take 50 mg by mouth daily.)    losartan (COZAAR) 50 mg tablet Take 1 Tablet by mouth daily.  rivaroxaban (Xarelto) 20 mg tab tablet Take 1 Tablet by mouth daily.  ascorbic acid, vitamin C, (Vitamin C) 500 mg tablet Take 500 mg by mouth daily.  zinc 50 mg tab tablet Take 50 mg by mouth daily.  b complex vitamins tablet Take 2 Tablets by mouth daily. No current facility-administered medications for this visit. Lab Results   Component Value Date/Time    Cholesterol, total 166 11/15/2021 12:07 PM    HDL Cholesterol 66 11/15/2021 12:07 PM    LDL, calculated 83.8 11/15/2021 12:07 PM    VLDL, calculated 16.2 11/15/2021 12:07 PM    Triglyceride 81 11/15/2021 12:07 PM    CHOL/HDL Ratio 2.5 11/15/2021 12:07 PM       Lab Results   Component Value Date/Time    Sodium 138 06/17/2022 09:25 AM    Potassium 4.7 06/17/2022 09:25 AM    Chloride 104 06/17/2022 09:25 AM    CO2 28 06/17/2022 09:25 AM    Anion gap 6 06/17/2022 09:25 AM    Glucose 85 06/17/2022 09:25 AM    BUN 18 06/17/2022 09:25 AM    Creatinine 0.94 06/17/2022 09:25 AM    BUN/Creatinine ratio 19 06/17/2022 09:25 AM    GFR est AA >60 06/17/2022 09:25 AM    GFR est non-AA >60 06/17/2022 09:25 AM    Calcium 9.2 06/17/2022 09:25 AM       Lab Results   Component Value Date/Time    ALT (SGPT) 28 06/13/2022 11:07 AM    Alk.  phosphatase 52 06/13/2022 11:07 AM    Bilirubin, direct 0.2 06/13/2022 11:07 AM    Bilirubin, total 0.6 06/13/2022 11:07 AM       Lab Results   Component Value Date/Time    WBC 6.8 06/17/2022 09:25 AM    HGB 14.6 06/17/2022 09:25 AM    HCT 44.2 06/17/2022 09:25 AM    PLATELET 046 28/73/3391 09:25 AM    MCV 91.3 06/17/2022 09:25 AM       Lab Results   Component Value Date/Time    TSH 1.47 06/13/2022 11:07 AM         Lab Results   Component Value Date/Time    Cholesterol, total 166 11/15/2021 12:07 PM    Cholesterol, total 170 11/11/2020 03:24 PM    Cholesterol, total 168 11/06/2019 10:51 AM    Cholesterol, total 173 11/19/2018 09:03 AM    Cholesterol, total 181 11/13/2017 10:34 AM    HDL Cholesterol 66 11/15/2021 12:07 PM    HDL Cholesterol 65 11/11/2020 03:24 PM    HDL Cholesterol 63 11/06/2019 10:51 AM    HDL Cholesterol 63 11/19/2018 09:03 AM    HDL Cholesterol 64 11/13/2017 10:34 AM    LDL, calculated 83.8 11/15/2021 12:07 PM    LDL, calculated 93.4 11/11/2020 03:24 PM    LDL, calculated 94 11/06/2019 10:51 AM    LDL, calculated 96 11/19/2018 09:03 AM    LDL, calculated 106 (H) 11/13/2017 10:34 AM    Triglyceride 81 11/15/2021 12:07 PM    Triglyceride 58 11/11/2020 03:24 PM    Triglyceride 56 11/06/2019 10:51 AM    Triglyceride 70 11/19/2018 09:03 AM    Triglyceride 53 11/13/2017 10:34 AM    CHOL/HDL Ratio 2.5 11/15/2021 12:07 PM    CHOL/HDL Ratio 2.6 11/11/2020 03:24 PM    CHOL/HDL Ratio 2.9 07/21/2010 11:05 AM    CHOL/HDL Ratio 3.5 07/17/2009 09:58 AM                Please note that this dictation was completed with [employer], the [employer] voice recognition software. Quite often unanticipated grammatical, syntax, homophones, and other interpretative errors are inadvertently transcribed by the computer software. Please disregard these errors. Please excuse any errors that have escaped final proofreading.

## 2022-06-20 NOTE — PROGRESS NOTES
Labs printed and placed with pre op form. Waiting for PCP to review EKG from Dr. Stef Escalante today before sending.

## 2022-06-27 RX ORDER — LOSARTAN POTASSIUM 50 MG/1
TABLET ORAL
Qty: 90 TABLET | Refills: 1 | Status: SHIPPED | OUTPATIENT
Start: 2022-06-27

## 2022-06-27 NOTE — TELEPHONE ENCOUNTER
Requested Prescriptions     Signed Prescriptions Disp Refills    losartan (COZAAR) 50 mg tablet 90 Tablet 1     Sig: Take 1 tablet by mouth once daily     Authorizing Provider: George Finn     Ordering User: Andreas Vann     Verbal order per Dr. Lazaro Venegas.       Future Appointments   Date Time Provider Tamiko Ding   9/23/2022 11:10 AM Carmen Awan NP [facility]   12/19/2022  9:00 AM Roda Cockayne, MD [facility]

## 2022-07-05 DIAGNOSIS — Z96.652 STATUS POST TOTAL LEFT KNEE REPLACEMENT: Primary | ICD-10-CM

## 2022-07-05 RX ORDER — AMOXICILLIN 250 MG
1 CAPSULE ORAL 2 TIMES DAILY
Qty: 50 TABLET | Refills: 0 | Status: SHIPPED | OUTPATIENT
Start: 2022-07-05 | End: 2022-08-05 | Stop reason: ALTCHOICE

## 2022-07-05 RX ORDER — OXYCODONE HYDROCHLORIDE 5 MG/1
5-10 TABLET ORAL
Qty: 42 TABLET | Refills: 0 | Status: SHIPPED | OUTPATIENT
Start: 2022-07-05 | End: 2022-07-12 | Stop reason: SDUPTHER

## 2022-07-12 ENCOUNTER — OFFICE VISIT (OUTPATIENT)
Dept: ORTHOPEDIC SURGERY | Age: 72
End: 2022-07-12
Payer: MEDICARE

## 2022-07-12 DIAGNOSIS — Z96.652 STATUS POST TOTAL LEFT KNEE REPLACEMENT: ICD-10-CM

## 2022-07-12 DIAGNOSIS — F11.99 OPIOID USE, UNSPECIFIED WITH UNSPECIFIED OPIOID-INDUCED DISORDER (HCC): ICD-10-CM

## 2022-07-12 DIAGNOSIS — Z96.652 STATUS POST LEFT KNEE REPLACEMENT: Primary | ICD-10-CM

## 2022-07-12 PROCEDURE — 99024 POSTOP FOLLOW-UP VISIT: CPT | Performed by: ORTHOPAEDIC SURGERY

## 2022-07-12 RX ORDER — OXYCODONE HYDROCHLORIDE 5 MG/1
5 TABLET ORAL
Qty: 20 TABLET | Refills: 0 | Status: SHIPPED | OUTPATIENT
Start: 2022-07-12 | End: 2022-07-12 | Stop reason: CLARIF

## 2022-07-12 RX ORDER — OXYCODONE HYDROCHLORIDE 5 MG/1
5 TABLET ORAL
Qty: 20 TABLET | Refills: 0 | Status: SHIPPED | OUTPATIENT
Start: 2022-07-12 | End: 2022-07-19

## 2022-07-12 NOTE — PROGRESS NOTES
Lynne Ybarra (: 1950) is a 74 y.o. male, patient, here for evaluation of the following chief complaint(s):  Surgical Follow-up (Guadalupe Reyes , swelling and warm to the touch)       HPI:    Lab check. Total knee done on Friday. He is postop day #4. No Known Allergies    Current Outpatient Medications   Medication Sig    senna-docusate (PERICOLACE) 8.6-50 mg per tablet Take 1 Tablet by mouth two (2) times a day. Indications: constipation, opioid induced constipation    losartan (COZAAR) 50 mg tablet Take 1 tablet by mouth once daily    Allergy Relief, cetirizine, 10 mg tablet Take 1 tablet by mouth nightly (Patient taking differently: daily as needed.)    hydroCHLOROthiazide (HYDRODIURIL) 25 mg tablet Take 1 tablet by mouth once daily    amiodarone (PACERONE) 100 mg tablet Take 1 Tablet by mouth daily. (Patient taking differently: Take 50 mg by mouth daily.)    rivaroxaban (Xarelto) 20 mg tab tablet Take 1 Tablet by mouth daily.  ascorbic acid, vitamin C, (Vitamin C) 500 mg tablet Take 500 mg by mouth daily.  zinc 50 mg tab tablet Take 50 mg by mouth daily.  b complex vitamins tablet Take 2 Tablets by mouth daily. No current facility-administered medications for this visit. Past Medical History:   Diagnosis Date    [facility]-fib [facility])     Arrhythmia 2021    Cardioverted     JINA (generalized anxiety disorder) 2019    Hypertension     OA (osteoarthritis) 2011    Sleep apnea         Past Surgical History:   Procedure Laterality Date    HX CATARACT REMOVAL Left     left     HX CATARACT REMOVAL Right     HX HEENT Right     Yag Capsulotomy    HX HIP REPLACEMENT Left 1999    HX HIP REPLACEMENT Right  and ?     Right x2    CT ANESTH,KNEE AREA SURGERY  1966       Family History   Problem Relation Age of Onset    Lung Disease Mother     Heart Disease Father     Hypertension Sister     Hypertension Son     Hypertension Daughter     Hypertension Daughter         Social History     Socioeconomic History    Marital status:      Spouse name: Not on file    Number of children: Not on file    Years of education: Not on file    Highest education level: Not on file   Occupational History     Employer: [employer]   Tobacco Use    Smoking status: Never Smoker    Smokeless tobacco: Never Used   Vaping Use    Vaping Use: Never used   Substance and Sexual Activity    Alcohol use: Yes     Alcohol/week: 2.0 standard drinks     Types: 2 Standard drinks or equivalent per week     Comment: socially     Drug use: No    Sexual activity: Yes     Partners: Female     Birth control/protection: None   Other Topics Concern    Not on file   Social History Narrative    Not on file     Social Determinants of Health     Financial Resource Strain:     Difficulty of Paying Living Expenses: Not on file   Food Insecurity:     Worried About Running Out of Food in the Last Year: Not on file    Dez of Food in the Last Year: Not on file   Transportation Needs:     Lack of Transportation (Medical): Not on file    Lack of Transportation (Non-Medical):  Not on file   Physical Activity:     Days of Exercise per Week: Not on file    Minutes of Exercise per Session: Not on file   Stress:     Feeling of Stress : Not on file   Social Connections:     Frequency of Communication with Friends and Family: Not on file    Frequency of Social Gatherings with Friends and Family: Not on file    Attends Faith Services: Not on file    Active Member of Clubs or Organizations: Not on file    Attends Club or Organization Meetings: Not on file    Marital Status: Not on file   Intimate Partner Violence:     Fear of Current or Ex-Partner: Not on file    Emotionally Abused: Not on file    Physically Abused: Not on file    Sexually Abused: Not on file   Housing Stability:     Unable to Pay for Housing in the Last Year: Not on file    Number of [place] in the Last Year: Not on file    Unstable Housing in the Last Year: Not on file             Vitals: There were no vitals taken for this visit. There is no height or weight on file to calculate BMI. PHYSICAL EXAM:  Exam today shows an amount of bruising as the patient is on a anticoagulant. Walking well. IMAGING:  None    ASSESSMENT/PLAN:  1. Status post left knee replacement  -     REFERRAL TO PHYSICAL THERAPY  2. Status post total left knee replacement    Well post left total knee. I see no issues. Appropriate amount of bruising and swelling. No sign deep infection. Follow-up in 2 and half weeks    An electronic signature was used to authenticate this note.   --Av Gunn MD

## 2022-07-14 ENCOUNTER — TELEPHONE (OUTPATIENT)
Dept: ORTHOPEDIC SURGERY | Age: 72
End: 2022-07-14

## 2022-07-14 NOTE — TELEPHONE ENCOUNTER
Patient wife call to report patients crown popped out and will need to see the dentist to have repaired. Dental work approved by Dr. Aye Edwards. Reached out to patient and he indicated his dentist has already sent in antibiotics.

## 2022-07-20 RX ORDER — RIVAROXABAN 20 MG/1
TABLET, FILM COATED ORAL
Qty: 90 TABLET | Refills: 1 | Status: SHIPPED | OUTPATIENT
Start: 2022-07-20

## 2022-07-20 NOTE — TELEPHONE ENCOUNTER
Cardiologist: Dr. Sreekanth Le    Last appt: 6/20/2022  Future Appointments   Date Time Provider Tamiko Ding   7/29/2022  1:20 PM Fabien Lux PA-C TOSP BS AMB   9/23/2022 11:10 AM MADALYN Chauhan BS AMB   12/19/2022  9:00 AM MD GRACIA Mckeon BS AMB       Requested Prescriptions     Signed Prescriptions Disp Refills    rivaroxaban (Xarelto) 20 mg tab tablet 90 Tablet 1     Sig: Take 1 tablet by mouth once daily     Authorizing Provider: Agapito Andersen     Ordering User: Mane Call         Refills VO per Dr. Mary Hart.

## 2022-08-05 ENCOUNTER — OFFICE VISIT (OUTPATIENT)
Dept: ORTHOPEDIC SURGERY | Age: 72
End: 2022-08-05
Payer: MEDICARE

## 2022-08-05 VITALS — WEIGHT: 180 LBS | HEIGHT: 67 IN | BODY MASS INDEX: 28.25 KG/M2

## 2022-08-05 DIAGNOSIS — Z96.652 STATUS POST TOTAL LEFT KNEE REPLACEMENT: Primary | ICD-10-CM

## 2022-08-05 PROCEDURE — 99024 POSTOP FOLLOW-UP VISIT: CPT | Performed by: PHYSICIAN ASSISTANT

## 2022-08-05 NOTE — PROGRESS NOTES
Alexandre Nuñez (: 1950) is a 74 y.o. male, established patient, here for evaluation of the following chief complaint(s):  Surgical Follow-up         SUBJECTIVE/OBJECTIVE:    Alexandre Nuñez (: 1950) is a 74 y.o. male. Left Eye Second Yag Capsulotomy - Left 2022     The patient is doing well at this time and is pleased with the results of his surgery and the progress which he has made to date. No Known Allergies    Current Outpatient Medications   Medication Sig    rivaroxaban (Xarelto) 20 mg tab tablet Take 1 tablet by mouth once daily    losartan (COZAAR) 50 mg tablet Take 1 tablet by mouth once daily    Allergy Relief, cetirizine, 10 mg tablet Take 1 tablet by mouth nightly (Patient taking differently: daily as needed.)    hydroCHLOROthiazide (HYDRODIURIL) 25 mg tablet Take 1 tablet by mouth once daily    amiodarone (PACERONE) 100 mg tablet Take 1 Tablet by mouth daily. (Patient taking differently: Take 50 mg by mouth daily.)    ascorbic acid, vitamin C, (Vitamin C) 500 mg tablet Take 500 mg by mouth daily. zinc 50 mg tab tablet Take 50 mg by mouth daily. b complex vitamins tablet Take 2 Tablets by mouth daily. No current facility-administered medications for this visit. Social History     Socioeconomic History    Marital status:      Spouse name: Not on file    Number of children: Not on file    Years of education: Not on file    Highest education level: Not on file   Occupational History     Employer: [employer]   Tobacco Use    Smoking status: Never    Smokeless tobacco: Never   Vaping Use    Vaping Use: Never used   Substance and Sexual Activity    Alcohol use:  Yes     Alcohol/week: 2.0 standard drinks     Types: 2 Standard drinks or equivalent per week     Comment: socially     Drug use: No    Sexual activity: Yes     Partners: Female     Birth control/protection: None   Other Topics Concern    Not on file   Social History Narrative    Not on file Social Determinants of Health     Financial Resource Strain: Not on file   Food Insecurity: Not on file   Transportation Needs: Not on file   Physical Activity: Not on file   Stress: Not on file   Social Connections: Not on file   Intimate Partner Violence: Not on file   Housing Stability: Not on file       Past Surgical History:   Procedure Laterality Date    HX CATARACT REMOVAL Left 1993    left     HX CATARACT REMOVAL Right 2018    HX HEENT Right 2022    Yag Capsulotomy    HX HIP REPLACEMENT Left 01/1999    HX HIP REPLACEMENT Right 2004 and 2006? Right x2    [place] ANESTH,KNEE AREA SURGERY  1966       Family History   Problem Relation Age of Onset    Lung Disease Mother     Heart Disease Father     Hypertension Sister     Hypertension Son     Hypertension Daughter     Hypertension Daughter                REVIEW OF SYSTEMS:    Patient denies any recent fever, chills, nausea, vomiting, chest pain, abdominal pain, blurred vision, dizziness or shortness of breath. Vitals:    Ht 5' 7\" (1.702 m)   Wt 180 lb (81.6 kg)   BMI 28.19 kg/m²    Body mass index is 28.19 kg/m². PHYSICAL EXAM:    The patient is alert and oriented x 3 and in no acute distress. The postoperative wound is well healed without erythema or drainage. Range of motion of the operative knee demonstrates full extension with flexion to 110 degrees. Mild swelling of the operative knee is noted. There is no calf tenderness to palpation. Distal motor and sensation is intact. IMAGING:    Results from Appointment encounter on 08/05/22    XR KNEE LT 3 V    Narrative  AP standing, lateral and Merchant view digital radiographs of the left knee obtained in the office today were reviewed and demonstrate anatomic alignment of components with no evidence of hardware loosening or subsidence.          Orders Placed This Encounter    XR KNEE LT 3 V     Standing Status:   Future     Number of Occurrences:   1     Standing Expiration Date:   8/6/2023 ASSESSMENT/PLAN:      1. Status post total left knee replacement  -     XR KNEE LT 3 V; Future        Below is the assessment and plan developed based on review of pertinent history, physical exam, labs, studies, and medications. Have discussed radiographic findings and have answered all patient questions to his satisfaction. Have provided the patient with a prescription for outpatient PT for ROM, strengthening and gait training. Have asked the patient to follow up in 8 weeks time for reevaluation with Dr Sammy Evangelista, sooner if he should develop any surgery related complications. The patient should remain out of work until follow-up appointment. The patient was asked to contact the office with any questions or concerns. The patient understands and agrees to the treatment plan as outlined above. Return in about 2 months (around 10/5/2022) for post op follow up. Dr. Sammy Evangelista was available for immediate consultation as needed. An electronic signature was used to authenticate this note.   -- Waldo De Leon PA-C

## 2022-09-09 ENCOUNTER — TELEPHONE (OUTPATIENT)
Dept: INTERNAL MEDICINE CLINIC | Age: 72
End: 2022-09-09

## 2022-09-22 NOTE — PROGRESS NOTES
[address] [facility] [place], [address]   Tel.  [phone]   Fax. 100 [facility] [address]   [place], [address] [facility]   Tel.  [phone]   Fax. [phone] [address]   Tel.  [phone]   Fax. [phone]     Lina Thibodeaux (: 1950) is a 74 y.o. male, established patient, seen for positive airway pressure follow-up, he was last seen by me on 2022, previously seen by Dr. Mayte Perez on 3/2022, prior notes reviewed in detail. Home sleep test 2022 showed an overall AHI 36/hr and lowest oxygen saturation was 76%. He is seen today for follow up. ASSESSMENT/PLAN:    ICD-10-CM ICD-9-CM    1. SAGE (obstructive sleep apnea)  G47.33 327.23 AMB SUPPLY ORDER      2. Primary hypertension  I10 401.9       3. BMI 28.0-28.9,adult  Z68.28 V85.24         AHI = 36 (). On APAP :  7-14 cmH2O. Set up . He is adherent with PAP therapy and PAP continues to benefit patient and remains necessary for control of his sleep apnea. Sleep Apnea - He reports doing better on his device. He has not received supplies and notes his mask has been leaking. Pressure settings changed to APAP 7-16 cmH2O. Changes made by Provider in AirView. Orders Placed This Encounter    AMB SUPPLY ORDER     Diagnosis: (G47.33) SAGE (obstructive sleep apnea)  (primary encounter diagnosis)     Replacement Supplies for Positive Airway Pressure Therapy Device:   Duration of need: 99 months.  Full Face Mask 1 every 3 months.  Full Face Mask Cushion 1 per month.  Headgear 1 every 6 months.  Positive Airway Pressure chinstrap 1 every 6 months.  Tubing with heating element 1 every 3 months.  Filter(s) Disposable 2 per month.  Filter(s) Non-Disposable 1 every 6 months. .   [facility] for Humidifier (Replace) 1 every 6 months. [facility] NPI: [id number]    Electronically signed.  Date:- 09/23/22       * Counseling was provided regarding the importance of regular PAP use with emphasis on ensuring sufficient total sleep time, proper sleep hygiene, and safe driving. * Re-enforced proper and regular cleaning for the device. * He was asked to contact our office for any problems regarding PAP therapy. 2. Hypertension -  continue on his current regimen, he will continue to monitor his BP and follow up with his PMD for reevaluation/adjustment of medications if warranted. I have reviewed the relationship between hypertension as it relates to sleep-disordered breathing. 3. Recommended a dedicated weight loss program through appropriate diet and exercise regimen as significant weight reduction has been shown to reduce severity of obstructive sleep apnea. SUBJECTIVE/OBJECTIVE:    He  is seen today for follow up on PAP device and reports no problems using the device. The following concerns identified:    Drowsiness no Problems exhaling no   Snoring no Forget to put on no   Mask Comfortable yes Can't fall asleep no   Dry Mouth no Mask falls off no   Air Leaking no Frequent awakenings no     He admits that his sleep has improved on PAP therapy using full face mask and heated tubing. Review of device download indicated:  Auto pressure: 7-14 cmH2O; Max Pressure: 13.9 cmH2O;  95th percentile Pressure: 13.8 cmH2O   95th Percentile Leak: 0.8 L/Min     % Used Days >= 4 hours: 100. Avg hours used:  5 hours 47 minutes. Therapy Apnea Index averaged over PAP use: 4.4 /hr which reflects improved sleep breathing condition. [place] Sleepiness Score: 7 and Modified F.O.S.Q. Score Total / 2: 19.5 which reflects improved sleep quality over therapy time. Sleep Review of Systems: notable for Negative difficulty falling asleep; Negative awakenings at night; Negative early morning headaches; Negative memory problems; Negative concentration issues;  Negative chest pain; Negative shortness of breath; Negative significant joint pain at night; Negative significant muscle pain at night; Negative rashes or itching; Negative heartburn; Negative significant mood issues    Visit Vitals  /81 (BP 1 Location: Left upper arm, BP Patient Position: Sitting)   Pulse (!) 57   Ht 5' 7\" (1.702 m)   Wt 181 lb 6.4 oz (82.3 kg)   SpO2 96%   BMI 28.41 kg/m²        General:   Alert, oriented, not in acute distress   Eyes:  Anicteric Sclerae; no obvious strabismus   Nose:  No obvious nasal septum deviation    Neck:   Midline trachea   Chest/Lungs:  Symmetrical lung expansion, clear lung fields on auscultation    CVS:  Normal rate, regular rhythm,  no JVD   Extremities:  No obvious rashes, no edema    Neuro:  No focal deficits; No obvious tremor    Psych:  Normal affect,  normal countenance     Patient's phone number [phone] (home) [phone] (work) was reviewed and confirmed for accuracy. He gives permission for messages regarding results and appointments to be left at that number. On this date 09/23/2022 I have spent 20 minutes reviewing previous notes, test results and face to face with the patient discussing the diagnosis and importance of compliance with the treatment plan as well as documenting on the day of the visit. An electronic signature was used to authenticate this note.     -- Tylor Calderon NP, [facility]  09/23/22

## 2022-09-23 ENCOUNTER — OFFICE VISIT (OUTPATIENT)
Dept: SLEEP MEDICINE | Age: 72
End: 2022-09-23
Payer: MEDICARE

## 2022-09-23 VITALS
DIASTOLIC BLOOD PRESSURE: 81 MMHG | HEIGHT: 67 IN | WEIGHT: 181.4 LBS | BODY MASS INDEX: 28.47 KG/M2 | HEART RATE: 57 BPM | SYSTOLIC BLOOD PRESSURE: 123 MMHG | OXYGEN SATURATION: 96 %

## 2022-09-23 DIAGNOSIS — I10 PRIMARY HYPERTENSION: ICD-10-CM

## 2022-09-23 DIAGNOSIS — G47.33 OSA (OBSTRUCTIVE SLEEP APNEA): Primary | ICD-10-CM

## 2022-09-23 PROCEDURE — G8432 DEP SCR NOT DOC, RNG: HCPCS | Performed by: NURSE PRACTITIONER

## 2022-09-23 PROCEDURE — 3017F COLORECTAL CA SCREEN DOC REV: CPT | Performed by: NURSE PRACTITIONER

## 2022-09-23 PROCEDURE — 1101F PT FALLS ASSESS-DOCD LE1/YR: CPT | Performed by: NURSE PRACTITIONER

## 2022-09-23 PROCEDURE — G8417 CALC BMI ABV UP PARAM F/U: HCPCS | Performed by: NURSE PRACTITIONER

## 2022-09-23 PROCEDURE — G8754 DIAS BP LESS 90: HCPCS | Performed by: NURSE PRACTITIONER

## 2022-09-23 PROCEDURE — G8427 DOCREV CUR MEDS BY ELIG CLIN: HCPCS | Performed by: NURSE PRACTITIONER

## 2022-09-23 PROCEDURE — G8536 NO DOC ELDER MAL SCRN: HCPCS | Performed by: NURSE PRACTITIONER

## 2022-09-23 PROCEDURE — G8752 SYS BP LESS 140: HCPCS | Performed by: NURSE PRACTITIONER

## 2022-09-23 PROCEDURE — 1123F ACP DISCUSS/DSCN MKR DOCD: CPT | Performed by: NURSE PRACTITIONER

## 2022-09-23 PROCEDURE — 99213 OFFICE O/P EST LOW 20 MIN: CPT | Performed by: NURSE PRACTITIONER

## 2022-09-23 NOTE — PATIENT INSTRUCTIONS
[address] [facility]. [place], [address]  Tel.  [phone]  Fax. 100 [facility] 60  [place], [address] [facility]  Tel.  [phone]  Fax. [phone] [address]  Tel.  [phone]  Fax. [phone]     Learning About CPAP for Sleep Apnea  What is CPAP? CPAP is a small machine that you use at home every night while you sleep. It increases air pressure in your throat to keep your airway open. When you have sleep apnea, this can help you sleep better so you feel much better. CPAP stands for \"continuous positive airway pressure. \"  The CPAP machine will have one of the following:  A mask that covers your nose and mouth  Prongs that fit into your nose  A mask that covers your nose only, the most common type. This type is called NCPAP. The N stands for \"nasal.\"  Why is it done? CPAP is usually the best treatment for obstructive sleep apnea. It is the first treatment choice and the most widely used. Your doctor may suggest CPAP if you have: Moderate to severe sleep apnea. Sleep apnea and coronary artery disease (CAD) or heart failure. How does it help? CPAP can help you have more normal sleep, so you feel less sleepy and more alert during the daytime. CPAP may help keep heart failure or other heart problems from getting worse. NCPAP may help lower your blood pressure. If you use CPAP, your bed partner may also sleep better because you are not snoring or restless. What are the side effects? Some people who use CPAP have:  A dry or stuffy nose and a sore throat. Irritated skin on the face. Sore eyes. Bloating. If you have any of these problems, work with your doctor to fix them. Here are some things you can try:  Be sure the mask or nasal prongs fit well. See if your doctor can adjust the pressure of your CPAP. If your nose is dry, try a humidifier.   If your nose is runny or stuffy, try decongestant medicine or a steroid nasal spray. If these things do not help, you might try a different type of machine. Some machines have air pressure that adjusts on its own. Others have air pressures that are different when you breathe in than when you breathe out. This may reduce discomfort caused by too much pressure in your nose. Where can you learn more? Go to Social Pulse.be  Enter Nathanieljuan pablo Hernandez in the search box to learn more about \"Learning About CPAP for Sleep Apnea. \"   © 5587-1015 Healthwise, Incorporated. Care instructions adapted under license by [employer] (which disclaims liability or warranty for this information). This care instruction is for use with your licensed healthcare professional. If you have questions about a medical condition or this instruction, always ask your healthcare professional. [address] any warranty or liability for your use of this information. Content Version: 9.1.77256; Last Revised: January 11, 2010  PROPER SLEEP HYGIENE    What to avoid  Do not have drinks with caffeine, such as coffee or black tea, for 8 hours before bed. Do not smoke or use other types of tobacco near bedtime. Nicotine is a stimulant and can keep you awake. Avoid drinking alcohol late in the evening, because it can cause you to wake in the middle of the night. Do not eat a big meal close to bedtime. If you are hungry, eat a light snack. Do not drink a lot of water close to bedtime, because the need to urinate may wake you up during the night. Do not read or watch TV in bed. Use the bed only for sleeping and sexual activity. What to try  Go to bed at the same time every night, and wake up at the same time every morning. Do not take naps during the day. Keep your bedroom quiet, dark, and cool. Get regular exercise, but not within 3 to 4 hours of your bedtime. .  Sleep on a comfortable pillow and mattress.   If watching the clock makes you anxious, turn it facing away from you so you cannot see the time. If you worry when you lie down, start a worry book. Well before bedtime, write down your worries, and then set the book and your concerns aside. Try meditation or other relaxation techniques before you go to bed. If you cannot fall asleep, get up and go to another room until you feel sleepy. Do something relaxing. Repeat your bedtime routine before you go to bed again. Make your house quiet and calm about an hour before bedtime. Turn down the lights, turn off the TV, log off the computer, and turn down the volume on music. This can help you relax after a busy day. Drowsy Driving: The [employer] cites drowsiness as a causing factor in more than 187,598 police reported crashes annually, resulting in 76,000 injuries and 1,500 deaths. Other surveys suggest 55% of people polled have driven while drowsy in the past year, 23% had fallen asleep but not crashed, 3% crashed, and 2% had and accident due to drowsy driving. Who is at risk? Young Drivers: One study of drowsy driving accidents states that 55% of the drivers were under 25 years. Of those, 75% were male. Shift Workers and Travelers: People who work overnight or travel across time zones frequently are at higher risk of experiencing Circadian Rhythm Disorders. They are trying to work and function when their body is programed to sleep. Sleep Deprived: Lack of sleep has a serious impact on your ability to pay attention or focus on a task. Consistently getting less than the average of 8 hours your body needs creates partial or cumulative sleep deprivation. Untreated Sleep Disorders: Sleep Apnea, Narcolepsy, R.L.S., and other sleep disorders (untreated) prevent a person from getting enough restful sleep. This leads to excessive daytime sleepiness and increases the risk for drowsy driving accidents by up to 7 times.   Medications / Alcohol: Even over the counter medications can cause drowsiness. Medications that impair a drivers attention should have a warning label. Alcohol naturally makes you sleepy and on its own can cause accidents. Combined with excessive drowsiness its effects are amplified. Signs of Drowsy Driving:   * You don't remember driving the last few miles   * You may drift out of your miguel   * You are unable to focus and your thoughts wander   * You may yawn more often than normal   * You have difficulty keeping your eyes open / nodding off   * Missing traffic signs, speeding, or tailgating  Prevention-   Good sleep hygiene, lifestyle and behavioral choices have the most impact on drowsy driving. There is no substitute for sleep and the average person requires 8 hours nightly. If you find yourself driving drowsy, stop and sleep. Consider the sleep hygiene tips provided during your visit as well. Medication Refill Policy: Refills for all medications require 1 week advance notice. Please have your pharmacy fax a refill request. We are unable to fax, or call in \"controled substance\" medications and you will need to pick these prescriptions up from our office. [employer] Activation    Thank you for requesting access to [employer]. Please follow the instructions below to securely access and download your online medical record. [employer] allows you to send messages to your doctor, view your test results, renew your prescriptions, schedule appointments, and more. How Do I Sign Up? In your internet browser, go to [email or web address]. Click on the First Time User? Click Here link in the Sign In box. You will see the New Member Sign Up page. Enter your [employer] Access Code exactly as it appears below. You will not need to use this code after youve completed the sign-up process. If you do not sign up before the expiration date, you must request a new code. Media[employer] Access Code:  Activation code not generated  Current Media[employer] Status: Active (This is the date your [employer] access code will )    Enter the last four digits of your Social Security Number (xxxx) and Date of Birth (mm/dd/yyyy) as indicated and click Submit. You will be taken to the next sign-up page. Create a [employer] ID. This will be your [employer] login ID and cannot be changed, so think of one that is secure and easy to remember. Create a [employer] password. You can change your password at any time. Enter your Password Reset Question and Answer. This can be used at a later time if you forget your password. Enter your e-mail address. You will receive e-mail notification when new information is available in [address] Click Sign Up. You can now view and download portions of your medical record. Click the XanEdu link to download a portable copy of your medical information. Additional Information    If you have questions, please call [phone]. Remember, [employer] is NOT to be used for urgent needs. For medical emergencies, dial 911.

## 2022-10-04 ENCOUNTER — PATIENT MESSAGE (OUTPATIENT)
Dept: CARDIOLOGY CLINIC | Age: 72
End: 2022-10-04

## 2022-10-04 DIAGNOSIS — I48.91 ATRIAL FIBRILLATION AND FLUTTER (HCC): ICD-10-CM

## 2022-10-04 DIAGNOSIS — I48.91 ATRIAL FIBRILLATION, UNSPECIFIED TYPE (HCC): Primary | ICD-10-CM

## 2022-10-04 DIAGNOSIS — I48.92 ATRIAL FIBRILLATION AND FLUTTER (HCC): ICD-10-CM

## 2022-10-04 DIAGNOSIS — I47.1 ATRIAL TACHYCARDIA (HCC): ICD-10-CM

## 2022-10-04 RX ORDER — AMIODARONE HYDROCHLORIDE 100 MG/1
50 TABLET ORAL DAILY
Qty: 90 TABLET | Refills: 2 | Status: SHIPPED | OUTPATIENT
Start: 2022-10-04

## 2022-10-04 NOTE — TELEPHONE ENCOUNTER
Pt Mychart messaged to ask what the dose of his amiodarone is. Per dr Iram Barker on 06/20/22 OV dose was halved so pt should now be taking 50mg.  RX sent and pt notified

## 2022-10-06 ENCOUNTER — OFFICE VISIT (OUTPATIENT)
Dept: ORTHOPEDIC SURGERY | Age: 72
End: 2022-10-06
Payer: MEDICARE

## 2022-10-06 DIAGNOSIS — Z96.652 STATUS POST LEFT KNEE REPLACEMENT: Primary | ICD-10-CM

## 2022-10-06 PROCEDURE — 99024 POSTOP FOLLOW-UP VISIT: CPT | Performed by: ORTHOPAEDIC SURGERY

## 2022-10-06 NOTE — PROGRESS NOTES
Shanna Meraz (: 1950) is a 74 y.o. male, patient, here for evaluation of the following chief complaint(s):  Surgical Follow-up (Post op follow up - Juliet Groves 2022)       HPI:    Doing well post left total knee. No Known Allergies    Current Outpatient Medications   Medication Sig    amiodarone (PACERONE) 100 mg tablet Take 0.5 Tablets by mouth daily. rivaroxaban (Xarelto) 20 mg tab tablet Take 1 tablet by mouth once daily    losartan (COZAAR) 50 mg tablet Take 1 tablet by mouth once daily    Allergy Relief, cetirizine, 10 mg tablet Take 1 tablet by mouth nightly (Patient taking differently: daily as needed.)    hydroCHLOROthiazide (HYDRODIURIL) 25 mg tablet Take 1 tablet by mouth once daily    ascorbic acid, vitamin C, (VITAMIN C) 500 mg tablet Take 500 mg by mouth daily. zinc 50 mg tab tablet Take 50 mg by mouth daily. b complex vitamins tablet Take 2 Tablets by mouth daily. No current facility-administered medications for this visit. Past Medical History:   Diagnosis Date    A-fib [facility])     Arrhythmia 2021    Cardioverted     JINA (generalized anxiety disorder) 2019    Hypertension     OA (osteoarthritis) 2011    Sleep apnea         Past Surgical History:   Procedure Laterality Date    HX CATARACT REMOVAL Left     left     HX CATARACT REMOVAL Right     HX HEENT Right     Yag Capsulotomy    HX HIP REPLACEMENT Left 1999    HX HIP REPLACEMENT Right  and ?     Right x2    [place] ANESTH,KNEE AREA SURGERY  1966       Family History   Problem Relation Age of Onset    Lung Disease Mother     Heart Disease Father     Hypertension Sister     Hypertension Son     Hypertension Daughter     Hypertension Daughter         Social History     Socioeconomic History    Marital status:      Spouse name: Not on file    Number of children: Not on file    Years of education: Not on file    Highest education level: Not on file   Occupational History     Employer: [employer]   Tobacco Use    Smoking status: Never    Smokeless tobacco: Never   Vaping Use    Vaping Use: Never used   Substance and Sexual Activity    Alcohol use: Yes     Alcohol/week: 2.0 standard drinks     Types: 2 Drinks containing 0.5 oz of alcohol per week     Comment: Could be some weeks nothing    Drug use: No    Sexual activity: Not Currently     Partners: Female     Birth control/protection: None   Other Topics Concern    Not on file   Social History Narrative    Not on file     Social Determinants of Health     Financial Resource Strain: Not on file   Food Insecurity: Not on file   Transportation Needs: Not on file   Physical Activity: Not on file   Stress: Not on file   Social Connections: Not on file   Intimate Partner Violence: Not on file   Housing Stability: Not on file       ROS    Positive for: Musculoskeletal  Last edited by Osiris Cano on 10/6/2022  2:32 PM.            Vitals: There were no vitals taken for this visit. There is no height or weight on file to calculate BMI. ASSESSMENT/PLAN:  1. Status post left knee replacement  S post left total knee. No issues. 9-month follow-up. At his next follow-up visit I would x-ray both hips and his left knee. An electronic signature was used to authenticate this note.   --Shantel Espinoza MD

## 2022-10-13 DIAGNOSIS — Z96.652 STATUS POST TOTAL LEFT KNEE REPLACEMENT: Primary | ICD-10-CM

## 2022-10-13 RX ORDER — CEPHALEXIN 500 MG/1
CAPSULE ORAL
Qty: 4 CAPSULE | Refills: 2 | Status: SHIPPED | OUTPATIENT
Start: 2022-10-13

## 2022-11-30 DIAGNOSIS — I48.91 ATRIAL FIBRILLATION, UNSPECIFIED TYPE (HCC): ICD-10-CM

## 2022-12-01 LAB
ALBUMIN SERPL-MCNC: 3.9 G/DL (ref 3.5–5)
ALBUMIN/GLOB SERPL: 1.3 {RATIO} (ref 1.1–2.2)
ALP SERPL-CCNC: 55 U/L (ref 45–117)
ALT SERPL-CCNC: 32 U/L (ref 12–78)
AST SERPL-CCNC: 28 U/L (ref 15–37)
BILIRUB DIRECT SERPL-MCNC: 0.2 MG/DL (ref 0–0.2)
BILIRUB SERPL-MCNC: 0.7 MG/DL (ref 0.2–1)
GLOBULIN SER CALC-MCNC: 2.9 G/DL (ref 2–4)
PROT SERPL-MCNC: 6.8 G/DL (ref 6.4–8.2)
TSH SERPL DL<=0.05 MIU/L-ACNC: 1.55 UIU/ML (ref 0.36–3.74)

## 2022-12-15 RX ORDER — RIVAROXABAN 20 MG/1
TABLET, FILM COATED ORAL
Qty: 90 TABLET | Refills: 3 | Status: SHIPPED | OUTPATIENT
Start: 2022-12-15

## 2022-12-15 NOTE — TELEPHONE ENCOUNTER
Per VO of Dr. Nikko Armijo: 6/20/2022    Future Appointments   Date Time Provider Tamiko Ding   2/1/2023  9:20 AM MD GRACIA Dejesus BS AMB   6/8/2023  8:30 AM Alize Farah MD [facility] COURTNEY FRANCO AMB       Requested Prescriptions     Signed Prescriptions Disp Refills    rivaroxaban (Xarelto) 20 mg tab tablet 90 Tablet 3     Sig: TAKE 1 TABLET BY MOUTH EVERY DAY     Authorizing Provider: Julito Arzola     Ordering User: Hina Smith

## 2022-12-22 ENCOUNTER — VIRTUAL VISIT (OUTPATIENT)
Dept: INTERNAL MEDICINE CLINIC | Age: 72
End: 2022-12-22
Payer: MEDICARE

## 2022-12-22 DIAGNOSIS — U07.1 COVID-19: Primary | ICD-10-CM

## 2022-12-22 DIAGNOSIS — I10 ESSENTIAL HYPERTENSION: Primary | ICD-10-CM

## 2022-12-22 PROCEDURE — 99213 OFFICE O/P EST LOW 20 MIN: CPT | Performed by: STUDENT IN AN ORGANIZED HEALTH CARE EDUCATION/TRAINING PROGRAM

## 2022-12-22 RX ORDER — LOSARTAN POTASSIUM 50 MG/1
50 TABLET ORAL DAILY
Qty: 90 TABLET | Refills: 0 | Status: SHIPPED | OUTPATIENT
Start: 2022-12-22

## 2022-12-22 NOTE — TELEPHONE ENCOUNTER
Requested Prescriptions     Signed Prescriptions Disp Refills    losartan (COZAAR) 50 mg tablet 90 Tablet 0     Sig: Take 1 Tablet by mouth daily.      Authorizing Provider: Kim Camacho     Ordering User: Colleen Olea    Per Dr. Haritha Berrios verbal order

## 2022-12-22 NOTE — PROGRESS NOTES
Arcelia Connolly is a 74 y.o. male who was seen by synchronous (real-time) audio-video technology on 12/22/2022. Assessment & Plan:   Below is the assessment and plan developed based on review of pertinent history, physical exam (if applicable), labs, studies, and medications. 1. COVID-19  - discussed symptomatic treatment with OTC agents - will send [employer] message detailing this as well   - he can not take paxlovid due to interaction with amiodarone and xarelto. Offered molnupiravir, he is not interested in this. - reviewed quarantine guidelines        Subjective:   Arcelia Connolly was seen for Positive For Covid-19 and Cough    COVID positive this morning. Experiencing chest congestion  Using flonase, which helps runny nose. Also has a cough, slightly productive on occasion    Prior to Admission medications    Medication Sig Start Date End Date Taking? Authorizing Provider   rivaroxaban (Xarelto) 20 mg tab tablet TAKE 1 TABLET BY MOUTH EVERY DAY 12/15/22  Yes María Vaca MD   cephALEXin (KEFLEX) 500 mg capsule Take 4 tablets 1 hour prior to dental procedure 10/13/22  Yes Chela Harris MD   amiodarone (PACERONE) 100 mg tablet Take 0.5 Tablets by mouth daily. 10/4/22  Yes María Vaca MD   losartan (COZAAR) 50 mg tablet Take 1 tablet by mouth once daily 6/27/22  Yes María Vaca MD   Allergy Relief, cetirizine, 10 mg tablet Take 1 tablet by mouth nightly  Patient taking differently: daily as needed. 4/15/22  Yes Jillian Benitez MD   hydroCHLOROthiazide (HYDRODIURIL) 25 mg tablet Take 1 tablet by mouth once daily 4/15/22  Yes María Vaca MD   b complex vitamins tablet Take 2 Tablets by mouth daily. Yes Provider, Historical   ascorbic acid, vitamin C, (VITAMIN C) 500 mg tablet Take 500 mg by mouth daily. Provider, Historical   zinc 50 mg tab tablet Take 50 mg by mouth daily.   12/22/22  Provider, Historical       Patient Active Problem List   Diagnosis Code    HTN (hypertension) I10    OA (osteoarthritis) M19.90    Advance directive discussed with patient Z71.89    Atrial tachycardia (HCC) I47.1    JINA (generalized anxiety disorder) F41.1    Other male erectile dysfunction N52.8    Posterior capsular opacification visually significant of left eye H26.492    Opioid use, unspecified with unspecified opioid-induced disorder F11.99       ROS - per HPI      Objective:     Patient-Reported Vitals 12/22/2022   Patient-Reported Weight -   Patient-Reported Pulse 70   Patient-Reported Temperature 100.2   Patient-Reported Systolic  975   Patient-Reported Diastolic 87     General: alert, cooperative, no distress   Mental  status: normal mood, behavior, speech, dress, motor activity, and thought processes, able to follow commands   Eyes:    Mouth: mucous membranes moist   Neck: no visualized mass   Resp: normal effort and no respiratory distress   Neuro: no gross deficits   Musculoskeletal:    Skin: no discoloration or lesions of concern on visible areas   Psychiatric:      Samantha Rockwell, was evaluated through a synchronous (real-time) audio-video encounter. The patient (or guardian if applicable) is aware that this is a billable service. Verbal consent to proceed has been obtained within the past 12 months. The visit was conducted pursuant to the emergency declaration under the [facility], [address] [place] [address] authority and the [employer] Act. Patient identification was verified, and a caregiver was present when appropriate. The patient was located in a state where the provider was credentialed to provide care.      Lauren James MD

## 2022-12-23 RX ORDER — LOSARTAN POTASSIUM 50 MG/1
TABLET ORAL
Qty: 90 TABLET | Refills: 0 | OUTPATIENT
Start: 2022-12-23

## 2022-12-23 NOTE — TELEPHONE ENCOUNTER
RX sent 12/22/2022    LOV: 6/20/2022    Future Appointments   Date Time Provider Tamiko Ding   2/1/2023  9:20 AM MD GRACIA Dejesus   6/8/2023  8:30 AM Alize Farah MD [facility] COURTNEY BARRIOS       Requested Prescriptions     Refused Prescriptions Disp Refills    losartan (COZAAR) 50 mg tablet [Pharmacy Med Name: Losartan Potassium 50 MG Oral Tablet] 90 Tablet 0     Sig: Take 1 tablet by mouth once daily     Refused By: Hina Smith     Reason for Refusal: Request already responded to by other means (e.g. phone or fax)

## 2023-02-01 ENCOUNTER — OFFICE VISIT (OUTPATIENT)
Dept: CARDIOLOGY CLINIC | Age: 73
End: 2023-02-01
Payer: MEDICARE

## 2023-02-01 VITALS
SYSTOLIC BLOOD PRESSURE: 120 MMHG | DIASTOLIC BLOOD PRESSURE: 80 MMHG | BODY MASS INDEX: 28.09 KG/M2 | OXYGEN SATURATION: 98 % | WEIGHT: 179 LBS | HEIGHT: 67 IN | RESPIRATION RATE: 16 BRPM | HEART RATE: 58 BPM

## 2023-02-01 DIAGNOSIS — Z79.899 ON AMIODARONE THERAPY: ICD-10-CM

## 2023-02-01 DIAGNOSIS — I48.91 ATRIAL FIBRILLATION, UNSPECIFIED TYPE (HCC): Primary | ICD-10-CM

## 2023-02-01 PROCEDURE — 93010 ELECTROCARDIOGRAM REPORT: CPT | Performed by: SPECIALIST

## 2023-02-01 PROCEDURE — 1101F PT FALLS ASSESS-DOCD LE1/YR: CPT | Performed by: SPECIALIST

## 2023-02-01 PROCEDURE — G0463 HOSPITAL OUTPT CLINIC VISIT: HCPCS | Performed by: SPECIALIST

## 2023-02-01 PROCEDURE — 99214 OFFICE O/P EST MOD 30 MIN: CPT | Performed by: SPECIALIST

## 2023-02-01 PROCEDURE — 3074F SYST BP LT 130 MM HG: CPT | Performed by: SPECIALIST

## 2023-02-01 PROCEDURE — 3079F DIAST BP 80-89 MM HG: CPT | Performed by: SPECIALIST

## 2023-02-01 PROCEDURE — G8536 NO DOC ELDER MAL SCRN: HCPCS | Performed by: SPECIALIST

## 2023-02-01 PROCEDURE — 93005 ELECTROCARDIOGRAM TRACING: CPT | Performed by: SPECIALIST

## 2023-02-01 PROCEDURE — G8427 DOCREV CUR MEDS BY ELIG CLIN: HCPCS | Performed by: SPECIALIST

## 2023-02-01 PROCEDURE — 3017F COLORECTAL CA SCREEN DOC REV: CPT | Performed by: SPECIALIST

## 2023-02-01 PROCEDURE — G8417 CALC BMI ABV UP PARAM F/U: HCPCS | Performed by: SPECIALIST

## 2023-02-01 PROCEDURE — G8510 SCR DEP NEG, NO PLAN REQD: HCPCS | Performed by: SPECIALIST

## 2023-02-01 PROCEDURE — 1123F ACP DISCUSS/DSCN MKR DOCD: CPT | Performed by: SPECIALIST

## 2023-02-01 NOTE — PROGRESS NOTES
Visit Vitals  /80   Pulse (!) 58   Resp 16   Ht 5' 7\" (1.702 m)   Wt 179 lb (81.2 kg)   SpO2 98%   BMI 28.04 kg/m²

## 2023-02-01 NOTE — PROGRESS NOTES
[facility]                                                            OFFICE NOTE        Jayshree Wade M.D.,SALO Rosmery    1950  [id number]    Date/Time:  2/1/20238:07 AM            SUBJECTIVE:  Is doing very well. He denies any chest pain chest discomfort similar no shortness of breath. He remains active. No palpitation presyncopal syncopal episodes. Well the only complaint is falling asleep fairly frequently even in the morning. He is able to use his CPAP  Not consistently. Assessment/Plan  1. Paroxysmal atrial fibrillation: He remains in normal sinus rhythm     His EKG today reveals a sinus bradycardia rate of 58 minimal nonspecific interventricular conduction delay  no significant ST-T changes. Status post JOJO cardioversion 5/8/2022 with restoration of normal sinus rhythm. To be noted the patient did have a very slow heart rate approximately 30 bpm soon after cardioversion. Continue with half of the dose of amiodarone. 50 mg     The patient is now off metoprolol completely. Continue with Xarelto no untoward effects thus far. Discussed the potential side effects of amiodarone. Proceed with liver function test and TSH  in 7/23     LFTs TSH  are all okay as of 11/ 2022. CXR ok on 6/22 repeat next ov    We have discussed the potential for underlying conduction disease and the potential need for pacemaker in the future. He will let me know if any symptoms. Continue continue with yearly eye examination. Some blurred vision very seldom and consistently is reported back to eye examination he tells me it was normal.     2.  Cardiomyopathy: I suspect this was related to is atrial fibrillation rapid ventricular response. Echocardiogram last office visit with normal ejection fraction and this was been discussed with the patient.   Now off metoprolol and only on losartan this will be continued. Repeat echocardiogram the next office visit. 3.  CAD: Previous abnormal calcium score. (203 on 2019)  Nuclear stress test in December 2021 essentially with no ischemia. 4.  Hypertension: Well-controlled    5. Hyperlipidemia: Closely followed by his primary care physician. 5.  Status post left total knee replacement in the fall 2022    We have discussed potential for atrial fibrillation ablation if recurrent atrial fibrillation for inability to continue amiodarone. For now this will be on the back burner. I offered to him to proceed with the EP evaluation he wants to hold off at this point. We discussed nutrition and exercise a potential use of PDE inhibitors. Otherwise I will see him back in 6 months with liver function test and TSH 1 week prior. Also chest x-ray and echo. HPI   74 y.o. male. Patient with h/o HTN seen here for evaluation of HTN and bradycardia  ECHO on 4/17:Left ventricle: Systolic function was normal. Ejection fraction was  estimated in the range of 55 % to 60 %. There were no regional wall motion  abnormalities. Mitral valve: There was mild regurgitation. Aortic valve: Normal valve structure. The valve was trileaflet. Leaflets  exhibited mildly increased thickness, normal cuspal separation, and  sclerosis. Tricuspid valve: There was mild regurgitation.   HOLTER on 5/9/17: sinus bradycardia  with average of 58 frequent pacs and rare pvcs, 8 episodes of possible PAT with the longest of 31 beats at 165     Ca score on 6/19:Left main coronary artery: 0   Left anterior descending coronary artery: 170  Left circumflex coronary artery: 0  Right coronary artery: 33     Total calcium score: 203      NORMAL STRESS ECHO on 7/19 but htn response              CARDIAC STUDIES        02/09/22    ECHO ADULT FOLLOW-UP OR LIMITED 03/09/2022 3/9/2022    Interpretation Summary    Left Ventricle: Left ventricle size is normal. Normal wall thickness. Normal wall motion. Normal left ventricular systolic function with a visually estimated EF of 55 - 60%. Left Atrium: Left atrium is moderately dilated. Right Atrium: Right atrium is moderately dilated. Aorta: Mildly dilated aortic root. Mildly dilated ascending aorta. Signed by: Roberta Clark MD on 3/9/2022 12:32 PM            12/09/21    NUCLEAR CARDIAC STRESS TEST 12/13/2021 12/14/2021    Interpretation Summary  · ECG: Resting ECG demonstrates atrial fibrillation. Signed by: Roberta Clark MD on 12/13/2021  2:46 PM                    EKG Results       None                IMAGING      MRI Results (most recent):  No results found for this or any previous visit. CT Results (most recent):  Results from Hospital Encounter encounter on 06/10/19    CT HEART W/O CONT WITH CALCIUM    Narrative  EXAM:  CT HEART W/O CONT WITH CALCIUM    INDICATION:  Preventive. Screening. COMPARISON: None. TECHNIQUE: CT of the heart was performed without contrast. Quantitative coronary  artery CT calcium scoring was performed. CT dose reduction was achieved through  use of a standardized protocol tailored for this examination and automatic  exposure control for dose modulation. Adaptive statistical iterative  reconstruction (ASIR) was utilized. FINDINGS:  The coronary calcium score in each vessel is as follows:    Left main coronary artery: 0  Left anterior descending coronary artery: 170  Left circumflex coronary artery: 0  Right coronary artery: 33    Total calcium score: 203    Calcium score interpretation:  0-0 = No evidence of coronary artery disease (CAD). 1-10 = Minimal evidence of CAD   = Mild evidence of CAD  101-400 = Moderate evidence of CAD  >400 = Extensive evidence of CAD    Your score of 203 places you in the 30 percentile rank. That means that 70% of  men from 67-66 years old will have a higher calcium score than you. Chest CT findings:  The visualized lungs are clear. The entire lung fields are  not imaged on this examination. Impression  IMPRESSION: Total calcium score of 203. Moderate coronary artery disease. XR Results (most recent):  Results from Appointment encounter on 08/05/22    XR KNEE LT 3 V    Narrative  AP standing, lateral and Merchant view digital radiographs of the left knee obtained in the office today were reviewed and demonstrate anatomic alignment of components with no evidence of hardware loosening or subsidence. Past Medical History:   Diagnosis Date    A-fib [facility])     Arrhythmia 11/2021    Cardioverted     JINA (generalized anxiety disorder) 12/9/2019    Hypertension     OA (osteoarthritis) 8/19/2011    Sleep apnea      Past Surgical History:   Procedure Laterality Date    HX CATARACT REMOVAL Left 1993    left     HX CATARACT REMOVAL Right 2018    HX HEENT Right 2022    Yag Capsulotomy    HX HIP REPLACEMENT Left 01/1999    HX HIP REPLACEMENT Right 2004 and 2006? Right x2    NH ANESTH,KNEE AREA SURGERY  1966     Social History     Tobacco Use    Smoking status: Never    Smokeless tobacco: Never   Vaping Use    Vaping Use: Never used   Substance Use Topics    Alcohol use: Yes     Alcohol/week: 2.0 standard drinks     Types: 2 Drinks containing 0.5 oz of alcohol per week     Comment: Could be some weeks nothing    Drug use: No     Family History   Problem Relation Age of Onset    Lung Disease Mother     Heart Disease Father     Hypertension Sister     Hypertension Son     Hypertension Daughter     Hypertension Daughter      No Known Allergies      There were no vitals taken for this visit. There were no vitals filed for this visit. Review of Systems:   Pertinent items are noted in the History of Present Illness.        Visit Vitals  /80   Pulse (!) 58   Resp 16   Ht 5' 7\" (1.702 m)   Wt 179 lb (81.2 kg)   SpO2 98%   BMI 28.04 kg/m²     General Appearance:  Well developed, well nourished,alert and oriented x 3, and individual in no acute distress. Ears/Nose/Mouth/Throat:   Hearing grossly normal.         Neck: Supple. Chest:   Lungs clear to auscultation bilaterally. Cardiovascular:  Regular rate and rhythm, S1, S2 normal, no murmur. Abdomen:   Soft, non-tender, bowel sounds are active. Extremities: No edema bilaterally. Skin: Warm and dry. Current Outpatient Medications on File Prior to Visit   Medication Sig Dispense Refill    losartan (COZAAR) 50 mg tablet Take 1 Tablet by mouth daily. 90 Tablet 0    rivaroxaban (Xarelto) 20 mg tab tablet TAKE 1 TABLET BY MOUTH EVERY DAY 90 Tablet 3    cephALEXin (KEFLEX) 500 mg capsule Take 4 tablets 1 hour prior to dental procedure 4 Capsule 2    amiodarone (PACERONE) 100 mg tablet Take 0.5 Tablets by mouth daily. 90 Tablet 2    Allergy Relief, cetirizine, 10 mg tablet Take 1 tablet by mouth nightly (Patient taking differently: daily as needed.) 90 Tablet 0    hydroCHLOROthiazide (HYDRODIURIL) 25 mg tablet Take 1 tablet by mouth once daily 90 Tablet 1    ascorbic acid, vitamin C, (VITAMIN C) 500 mg tablet Take 500 mg by mouth daily. b complex vitamins tablet Take 2 Tablets by mouth daily. No current facility-administered medications on file prior to visit. Kel Farooqtigre had no medications administered during this visit. Current Outpatient Medications   Medication Sig    losartan (COZAAR) 50 mg tablet Take 1 Tablet by mouth daily. rivaroxaban (Xarelto) 20 mg tab tablet TAKE 1 TABLET BY MOUTH EVERY DAY    cephALEXin (KEFLEX) 500 mg capsule Take 4 tablets 1 hour prior to dental procedure    amiodarone (PACERONE) 100 mg tablet Take 0.5 Tablets by mouth daily.     Allergy Relief, cetirizine, 10 mg tablet Take 1 tablet by mouth nightly (Patient taking differently: daily as needed.)    hydroCHLOROthiazide (HYDRODIURIL) 25 mg tablet Take 1 tablet by mouth once daily    ascorbic acid, vitamin C, (VITAMIN C) 500 mg tablet Take 500 mg by mouth daily. b complex vitamins tablet Take 2 Tablets by mouth daily. No current facility-administered medications for this visit. Lab Results   Component Value Date/Time    Cholesterol, total 166 11/15/2021 12:07 PM    HDL Cholesterol 66 11/15/2021 12:07 PM    LDL, calculated 83.8 11/15/2021 12:07 PM    VLDL, calculated 16.2 11/15/2021 12:07 PM    Triglyceride 81 11/15/2021 12:07 PM    CHOL/HDL Ratio 2.5 11/15/2021 12:07 PM       Lab Results   Component Value Date/Time    Sodium 138 06/17/2022 09:25 AM    Potassium 4.7 06/17/2022 09:25 AM    Chloride 104 06/17/2022 09:25 AM    CO2 28 06/17/2022 09:25 AM    Anion gap 6 06/17/2022 09:25 AM    Glucose 85 06/17/2022 09:25 AM    BUN 18 06/17/2022 09:25 AM    Creatinine 0.94 06/17/2022 09:25 AM    BUN/Creatinine ratio 19 06/17/2022 09:25 AM    GFR est AA >60 06/17/2022 09:25 AM    GFR est non-AA >60 06/17/2022 09:25 AM    Calcium 9.2 06/17/2022 09:25 AM       Lab Results   Component Value Date/Time    ALT (SGPT) 32 11/30/2022 08:51 AM    Alk.  phosphatase 55 11/30/2022 08:51 AM    Bilirubin, direct 0.2 11/30/2022 08:51 AM    Bilirubin, total 0.7 11/30/2022 08:51 AM       Lab Results   Component Value Date/Time    WBC 6.8 06/17/2022 09:25 AM    HGB 14.6 06/17/2022 09:25 AM    HCT 44.2 06/17/2022 09:25 AM    PLATELET 441 23/69/1857 09:25 AM    MCV 91.3 06/17/2022 09:25 AM       Lab Results   Component Value Date/Time    TSH 1.55 11/30/2022 08:51 AM         Lab Results   Component Value Date/Time    Cholesterol, total 166 11/15/2021 12:07 PM    Cholesterol, total 170 11/11/2020 03:24 PM    Cholesterol, total 168 11/06/2019 10:51 AM    Cholesterol, total 173 11/19/2018 09:03 AM    Cholesterol, total 181 11/13/2017 10:34 AM    HDL Cholesterol 66 11/15/2021 12:07 PM    HDL Cholesterol 65 11/11/2020 03:24 PM    HDL Cholesterol 63 11/06/2019 10:51 AM    HDL Cholesterol 63 11/19/2018 09:03 AM    HDL Cholesterol 64 11/13/2017 10:34 AM    LDL, calculated 83.8 11/15/2021 12:07 PM    LDL, calculated 93.4 11/11/2020 03:24 PM    LDL, calculated 94 11/06/2019 10:51 AM    LDL, calculated 96 11/19/2018 09:03 AM    LDL, calculated 106 (H) 11/13/2017 10:34 AM    Triglyceride 81 11/15/2021 12:07 PM    Triglyceride 58 11/11/2020 03:24 PM    Triglyceride 56 11/06/2019 10:51 AM    Triglyceride 70 11/19/2018 09:03 AM    Triglyceride 53 11/13/2017 10:34 AM    CHOL/HDL Ratio 2.5 11/15/2021 12:07 PM    CHOL/HDL Ratio 2.6 11/11/2020 03:24 PM    CHOL/HDL Ratio 2.9 07/21/2010 11:05 AM    CHOL/HDL Ratio 3.5 07/17/2009 09:58 AM                Please note that this dictation was completed with [employer], the [employer] voice recognition software. Quite often unanticipated grammatical, syntax, homophones, and other interpretative errors are inadvertently transcribed by the computer software. Please disregard these errors. Please excuse any errors that have escaped final proofreading.

## 2023-02-01 NOTE — PATIENT INSTRUCTIONS
Please have Echocardiogram, Chest x-ray and lab work done in 6 months.  Follow up with Dr. Stephanie Phillips 1 week after

## 2023-02-08 RX ORDER — SILDENAFIL 100 MG/1
100 TABLET, FILM COATED ORAL
Qty: 20 TABLET | Refills: 1 | Status: SHIPPED | OUTPATIENT
Start: 2023-02-08

## 2023-02-14 ENCOUNTER — PATIENT MESSAGE (OUTPATIENT)
Dept: CARDIOLOGY CLINIC | Age: 73
End: 2023-02-14

## 2023-02-15 RX ORDER — CETIRIZINE HYDROCHLORIDE 10 MG/1
TABLET, FILM COATED ORAL
Qty: 90 TABLET | Refills: 0 | Status: SHIPPED | OUTPATIENT
Start: 2023-02-15 | End: 2023-02-19 | Stop reason: SDUPTHER

## 2023-02-15 NOTE — TELEPHONE ENCOUNTER
Cardiologist: Dr. Rubin Abreu    Last appt: 2/1/2023  Future Appointments   Date Time Provider Tamiko Ding   6/8/2023  8:30 AM MD BOUCHRA Regan BS AMB   7/25/2023  9:00 AM REGI HOLLINGSWORTH BS AMB   8/1/2023  9:20 AM MD GRACIA Bajwa BS AMB       Requested Prescriptions     Signed Prescriptions Disp Refills    rivaroxaban (Xarelto) 20 mg tab tablet 90 Tablet 3     Sig: Take 1 Tablet by mouth daily (with dinner). Authorizing Provider: Jayro Tillman     Ordering User: ANNETTE STEPHENSON VO per Dr. Rubin Abreu.

## 2023-02-15 NOTE — TELEPHONE ENCOUNTER
Chief Complaint   Patient presents with    Medication Refill     Last Appointment with Dr. Brady Old:  6/17/2022  Future Appointments   Date Time Provider Tamiko Ding   6/8/2023  8:30 AM MD BOUCHRA Liu BS AMB   7/25/2023  9:00 AM REGI HOLLINGSWORTH BS AMB   8/1/2023  9:20 AM MD GRACIA Hernández BS AMB

## 2023-02-15 NOTE — TELEPHONE ENCOUNTER
Pt is calling because he needs a refill on Xarelto 20 mg. Pt sent a request through [facility] but he believes he sent the request for a refill to the wrong pharmacy. Pt would like it to go to his local pharmacy.       [phone]

## 2023-02-17 ENCOUNTER — PATIENT MESSAGE (OUTPATIENT)
Dept: INTERNAL MEDICINE CLINIC | Age: 73
End: 2023-02-17

## 2023-02-19 RX ORDER — CETIRIZINE HYDROCHLORIDE 10 MG/1
10 TABLET ORAL
Qty: 90 TABLET | Refills: 3 | Status: SHIPPED | OUTPATIENT
Start: 2023-02-19

## 2023-03-10 ENCOUNTER — PATIENT MESSAGE (OUTPATIENT)
Dept: CARDIOLOGY CLINIC | Age: 73
End: 2023-03-10

## 2023-03-10 DIAGNOSIS — I48.92 ATRIAL FIBRILLATION AND FLUTTER (HCC): ICD-10-CM

## 2023-03-10 DIAGNOSIS — I47.1 ATRIAL TACHYCARDIA (HCC): ICD-10-CM

## 2023-03-10 DIAGNOSIS — I48.91 ATRIAL FIBRILLATION AND FLUTTER (HCC): ICD-10-CM

## 2023-03-10 DIAGNOSIS — I10 ESSENTIAL HYPERTENSION: ICD-10-CM

## 2023-03-10 DIAGNOSIS — I48.91 ATRIAL FIBRILLATION, UNSPECIFIED TYPE (HCC): ICD-10-CM

## 2023-03-10 RX ORDER — AMIODARONE HYDROCHLORIDE 100 MG/1
50 TABLET ORAL DAILY
Qty: 90 TABLET | Refills: 2 | Status: SHIPPED | OUTPATIENT
Start: 2023-03-10

## 2023-03-10 NOTE — TELEPHONE ENCOUNTER
Cardiologist: Dr. Alan Quigley    Last appt: 2/1/2023  Future Appointments   Date Time Provider Tamiko Ding   6/8/2023  8:30 AM MD BOUCHRA Marie BS AMB   7/25/2023  9:00 AM REGI HOLLINGSWORTH BS AMB   8/1/2023  9:20 AM MD GRACIA Olguin BS AMB       Requested Prescriptions     Signed Prescriptions Disp Refills    amiodarone (PACERONE) 100 mg tablet 90 Tablet 2     Sig: Take 0.5 Tablets by mouth daily. Authorizing Provider: Maci Stokes     Ordering User: ANNETTE STEPHENSON         Refills VO per Dr. Alan Quigley.

## 2023-03-13 RX ORDER — LOSARTAN POTASSIUM 50 MG/1
TABLET ORAL
Qty: 90 TABLET | Refills: 1 | Status: SHIPPED | OUTPATIENT
Start: 2023-03-13

## 2023-03-13 NOTE — TELEPHONE ENCOUNTER
Cardiologist: Dr. Delia Santos    Last appt: 2/1/2023  Future Appointments   Date Time Provider Tamiko Toña   6/8/2023  8:30 AM MD BOUCHRA Nunn BS AMB   7/25/2023  9:00 AM REGI HOLLINGSWORTH BS AMB   8/1/2023  9:20 AM MD GRACIA Bauman BS AMB       Requested Prescriptions     Signed Prescriptions Disp Refills    losartan (COZAAR) 50 mg tablet 90 Tablet 1     Sig: Take 1 tablet by mouth once daily     Authorizing Provider: Emilee Abdi     Ordering User: ANNETTE STEPHENSON         Refills VO per Dr. Delia Santos.

## 2023-04-05 RX ORDER — HYDROCHLOROTHIAZIDE 25 MG/1
TABLET ORAL
Qty: 90 TABLET | Refills: 0
Start: 2023-04-05

## 2023-04-06 ENCOUNTER — PATIENT MESSAGE (OUTPATIENT)
Dept: CARDIOLOGY CLINIC | Age: 73
End: 2023-04-06

## 2023-04-06 NOTE — TELEPHONE ENCOUNTER
Cardiologist: Dr. Vasquez Decker    Last appt: 2/1/2023  Future Appointments   Date Time Provider Tamiko Toña   6/8/2023  8:30 AM MD BOUCHRA Moser BS AMB   7/25/2023  9:00 AM REGI HOLLINGSWORTH BS AMB   8/1/2023  9:20 AM MD GRACIA Aguilar BS AMB       Requested Prescriptions     Signed Prescriptions Disp Refills    hydroCHLOROthiazide (HYDRODIURIL) 25 mg tablet 90 Tablet 0     Sig: Take 1 tablet by mouth once daily     Authorizing Provider: Governor Balaji     Ordering User: ANNETTE STEPHENSON         Refills VO per Dr. Vasquez Decker.

## 2023-04-22 DIAGNOSIS — Z79.899 ON AMIODARONE THERAPY: Primary | ICD-10-CM

## 2023-04-23 DIAGNOSIS — Z79.899 ON AMIODARONE THERAPY: Primary | ICD-10-CM

## 2023-06-08 ENCOUNTER — OFFICE VISIT (OUTPATIENT)
Age: 73
End: 2023-06-08
Payer: MEDICARE

## 2023-06-08 VITALS
TEMPERATURE: 98 F | HEART RATE: 50 BPM | SYSTOLIC BLOOD PRESSURE: 133 MMHG | WEIGHT: 180 LBS | BODY MASS INDEX: 28.93 KG/M2 | HEIGHT: 66 IN | OXYGEN SATURATION: 99 % | RESPIRATION RATE: 18 BRPM | DIASTOLIC BLOOD PRESSURE: 88 MMHG

## 2023-06-08 DIAGNOSIS — Z12.11 COLON CANCER SCREENING: ICD-10-CM

## 2023-06-08 DIAGNOSIS — M75.01 ADHESIVE CAPSULITIS OF RIGHT SHOULDER: ICD-10-CM

## 2023-06-08 DIAGNOSIS — I47.1 ATRIAL TACHYCARDIA (HCC): ICD-10-CM

## 2023-06-08 DIAGNOSIS — R73.01 IMPAIRED FASTING GLUCOSE: ICD-10-CM

## 2023-06-08 DIAGNOSIS — Z12.5 ENCOUNTER FOR SCREENING FOR MALIGNANT NEOPLASM OF PROSTATE: ICD-10-CM

## 2023-06-08 DIAGNOSIS — Z00.00 MEDICARE ANNUAL WELLNESS VISIT, SUBSEQUENT: Primary | ICD-10-CM

## 2023-06-08 DIAGNOSIS — E78.2 MIXED HYPERLIPIDEMIA: ICD-10-CM

## 2023-06-08 DIAGNOSIS — I10 PRIMARY HYPERTENSION: ICD-10-CM

## 2023-06-08 DIAGNOSIS — S46.211A BICEPS RUPTURE, PROXIMAL, RIGHT, INITIAL ENCOUNTER: ICD-10-CM

## 2023-06-08 PROBLEM — F11.99 OPIOID USE, UNSPECIFIED WITH UNSPECIFIED OPIOID-INDUCED DISORDER (HCC): Status: RESOLVED | Noted: 2022-07-12 | Resolved: 2023-06-08

## 2023-06-08 PROCEDURE — G8427 DOCREV CUR MEDS BY ELIG CLIN: HCPCS | Performed by: INTERNAL MEDICINE

## 2023-06-08 PROCEDURE — G0439 PPPS, SUBSEQ VISIT: HCPCS | Performed by: INTERNAL MEDICINE

## 2023-06-08 PROCEDURE — 99214 OFFICE O/P EST MOD 30 MIN: CPT | Performed by: INTERNAL MEDICINE

## 2023-06-08 PROCEDURE — 1036F TOBACCO NON-USER: CPT | Performed by: INTERNAL MEDICINE

## 2023-06-08 PROCEDURE — 1123F ACP DISCUSS/DSCN MKR DOCD: CPT | Performed by: INTERNAL MEDICINE

## 2023-06-08 PROCEDURE — 3075F SYST BP GE 130 - 139MM HG: CPT | Performed by: INTERNAL MEDICINE

## 2023-06-08 PROCEDURE — 3079F DIAST BP 80-89 MM HG: CPT | Performed by: INTERNAL MEDICINE

## 2023-06-08 PROCEDURE — 3017F COLORECTAL CA SCREEN DOC REV: CPT | Performed by: INTERNAL MEDICINE

## 2023-06-08 PROCEDURE — G8419 CALC BMI OUT NRM PARAM NOF/U: HCPCS | Performed by: INTERNAL MEDICINE

## 2023-06-08 RX ORDER — VITAMIN B COMPLEX
1 CAPSULE ORAL DAILY
Qty: 30 CAPSULE | Refills: 3 | COMMUNITY
Start: 2023-06-08

## 2023-06-08 SDOH — ECONOMIC STABILITY: HOUSING INSECURITY
IN THE LAST 12 MONTHS, WAS THERE A TIME WHEN YOU DID NOT HAVE A STEADY PLACE TO SLEEP OR SLEPT IN A SHELTER (INCLUDING NOW)?: NO

## 2023-06-08 SDOH — ECONOMIC STABILITY: INCOME INSECURITY: HOW HARD IS IT FOR YOU TO PAY FOR THE VERY BASICS LIKE FOOD, HOUSING, MEDICAL CARE, AND HEATING?: NOT HARD AT ALL

## 2023-06-08 SDOH — ECONOMIC STABILITY: FOOD INSECURITY: WITHIN THE PAST 12 MONTHS, YOU WORRIED THAT YOUR FOOD WOULD RUN OUT BEFORE YOU GOT MONEY TO BUY MORE.: NEVER TRUE

## 2023-06-08 SDOH — ECONOMIC STABILITY: FOOD INSECURITY: WITHIN THE PAST 12 MONTHS, THE FOOD YOU BOUGHT JUST DIDN'T LAST AND YOU DIDN'T HAVE MONEY TO GET MORE.: NEVER TRUE

## 2023-06-08 ASSESSMENT — LIFESTYLE VARIABLES
HOW MANY STANDARD DRINKS CONTAINING ALCOHOL DO YOU HAVE ON A TYPICAL DAY: 1 OR 2
HOW OFTEN DO YOU HAVE A DRINK CONTAINING ALCOHOL: 2-4 TIMES A MONTH

## 2023-06-08 ASSESSMENT — PATIENT HEALTH QUESTIONNAIRE - PHQ9
SUM OF ALL RESPONSES TO PHQ QUESTIONS 1-9: 0
2. FEELING DOWN, DEPRESSED OR HOPELESS: 0
SUM OF ALL RESPONSES TO PHQ9 QUESTIONS 1 & 2: 0
SUM OF ALL RESPONSES TO PHQ QUESTIONS 1-9: 0
SUM OF ALL RESPONSES TO PHQ QUESTIONS 1-9: 0
1. LITTLE INTEREST OR PLEASURE IN DOING THINGS: 0
SUM OF ALL RESPONSES TO PHQ QUESTIONS 1-9: 0

## 2023-06-08 NOTE — PROGRESS NOTES
Chief Complaint   Patient presents with    Medicare AWV     Blood pressure 133/88, pulse 50, temperature 98 °F (36.7 °C), temperature source Temporal, resp. rate 18, height 5' 5.9\" (1.674 m), weight 180 lb (81.6 kg), SpO2 99 %.
capsule Take 1 capsule by mouth daily Yes Josh Yates MD   amiodarone (PACERONE) 100 MG tablet Take by mouth daily Yes Ar Automatic Reconciliation   cetirizine (ZYRTEC) 10 MG tablet Take by mouth Yes Ar Automatic Reconciliation   hydroCHLOROthiazide (HYDRODIURIL) 25 MG tablet Take 1 tablet by mouth once daily Yes Ar Automatic Reconciliation   losartan (COZAAR) 50 MG tablet Take by mouth daily Yes Ar Automatic Reconciliation   rivaroxaban (XARELTO) 20 MG TABS tablet Take by mouth Daily with supper Yes Ar Automatic Reconciliation   sildenafil (VIAGRA) 100 MG tablet Take by mouth daily as needed Yes Ar Automatic Reconciliation       CareTeam (Including outside providers/suppliers regularly involved in providing care):   Patient Care Team:  Josh Yates MD as PCP - Yue Mcdonnell MD as PCP - [facility]  Angie Seals MD as Physician     Reviewed and updated this visit:  Tobacco  Allergies  Meds  Med Hx  Surg Hx  Soc Hx  Fam Hx

## 2023-06-08 NOTE — PATIENT INSTRUCTIONS
Incorporated. Care instructions adapted under license by [facility] ([facility]). If you have questions about a medical condition or this instruction, always ask your healthcare professional. [address] any warranty or liability for your use of this information. A Healthy Heart: Care Instructions  Your Care Instructions     Coronary artery disease, also called heart disease, occurs when a substance called plaque builds up in the vessels that supply oxygen-rich blood to your heart muscle. This can narrow the blood vessels and reduce blood flow. A heart attack happens when blood flow is completely blocked. A high-fat diet, smoking, and other factors increase the risk of heart disease. Your doctor has found that you have a chance of having heart disease. You can do lots of things to keep your heart healthy. It may not be easy, but you can change your diet, exercise more, and quit smoking. These steps really work to lower your chance of heart disease. Follow-up care is a key part of your treatment and safety. Be sure to make and go to all appointments, and call your doctor if you are having problems. It's also a good idea to know your test results and keep a list of the medicines you take. How can you care for yourself at home? Diet    Use less salt when you cook and eat. This helps lower your blood pressure. Taste food before salting. Add only a little salt when you think you need it. With time, your taste buds will adjust to less salt.     Eat fewer snack items, fast foods, canned soups, and other high-salt, high-fat, processed foods.     Read food labels and try to avoid saturated and trans fats. They increase your risk of heart disease by raising cholesterol levels.     Limit the amount of solid fat-butter, margarine, and shortening-you eat. Use olive, peanut, or canola oil when you cook.  Bake, broil, and steam foods instead of frying them.     Eat a variety of fruit and vegetables every

## 2023-06-09 LAB
ALBUMIN SERPL-MCNC: 4.1 G/DL (ref 3.5–5)
ALBUMIN/GLOB SERPL: 1.4 (ref 1.1–2.2)
ALP SERPL-CCNC: 51 U/L (ref 45–117)
ALT SERPL-CCNC: 33 U/L (ref 12–78)
ANION GAP SERPL CALC-SCNC: 5 MMOL/L (ref 5–15)
AST SERPL-CCNC: 24 U/L (ref 15–37)
BASOPHILS # BLD: 0.1 K/UL (ref 0–0.1)
BASOPHILS NFR BLD: 1 % (ref 0–1)
BILIRUB SERPL-MCNC: 0.6 MG/DL (ref 0.2–1)
BUN SERPL-MCNC: 14 MG/DL (ref 6–20)
BUN/CREAT SERPL: 15 (ref 12–20)
CALCIUM SERPL-MCNC: 9.7 MG/DL (ref 8.5–10.1)
CHLORIDE SERPL-SCNC: 103 MMOL/L (ref 97–108)
CHOLEST SERPL-MCNC: 166 MG/DL
CO2 SERPL-SCNC: 30 MMOL/L (ref 21–32)
CREAT SERPL-MCNC: 0.92 MG/DL (ref 0.7–1.3)
DIFFERENTIAL METHOD BLD: ABNORMAL
EOSINOPHIL # BLD: 0.1 K/UL (ref 0–0.4)
EOSINOPHIL NFR BLD: 1 % (ref 0–7)
ERYTHROCYTE [DISTWIDTH] IN BLOOD BY AUTOMATED COUNT: 14.6 % (ref 11.5–14.5)
EST. AVERAGE GLUCOSE BLD GHB EST-MCNC: 108 MG/DL
GLOBULIN SER CALC-MCNC: 2.9 G/DL (ref 2–4)
GLUCOSE SERPL-MCNC: 94 MG/DL (ref 65–100)
HBA1C MFR BLD: 5.4 % (ref 4–5.6)
HCT VFR BLD AUTO: 46 % (ref 36.6–50.3)
HDLC SERPL-MCNC: 69 MG/DL
HDLC SERPL: 2.4 (ref 0–5)
HGB BLD-MCNC: 14.7 G/DL (ref 12.1–17)
IMM GRANULOCYTES # BLD AUTO: 0 K/UL (ref 0–0.04)
IMM GRANULOCYTES NFR BLD AUTO: 0 % (ref 0–0.5)
LDLC SERPL CALC-MCNC: 86 MG/DL (ref 0–100)
LYMPHOCYTES # BLD: 1.2 K/UL (ref 0.8–3.5)
LYMPHOCYTES NFR BLD: 17 % (ref 12–49)
MCH RBC QN AUTO: 28.6 PG (ref 26–34)
MCHC RBC AUTO-ENTMCNC: 32 G/DL (ref 30–36.5)
MCV RBC AUTO: 89.5 FL (ref 80–99)
MONOCYTES # BLD: 0.7 K/UL (ref 0–1)
MONOCYTES NFR BLD: 9 % (ref 5–13)
NEUTS SEG # BLD: 5.2 K/UL (ref 1.8–8)
NEUTS SEG NFR BLD: 72 % (ref 32–75)
NRBC # BLD: 0 K/UL (ref 0–0.01)
NRBC BLD-RTO: 0 PER 100 WBC
PLATELET # BLD AUTO: 331 K/UL (ref 150–400)
PMV BLD AUTO: 11.3 FL (ref 8.9–12.9)
POTASSIUM SERPL-SCNC: 4.6 MMOL/L (ref 3.5–5.1)
PROT SERPL-MCNC: 7 G/DL (ref 6.4–8.2)
PSA SERPL-MCNC: 1.1 NG/ML (ref 0.01–4)
RBC # BLD AUTO: 5.14 M/UL (ref 4.1–5.7)
SODIUM SERPL-SCNC: 138 MMOL/L (ref 136–145)
TRIGL SERPL-MCNC: 55 MG/DL
TSH SERPL DL<=0.05 MIU/L-ACNC: 1.59 UIU/ML (ref 0.36–3.74)
VLDLC SERPL CALC-MCNC: 11 MG/DL
WBC # BLD AUTO: 7.2 K/UL (ref 4.1–11.1)

## 2023-06-23 ENCOUNTER — PATIENT MESSAGE (OUTPATIENT)
Age: 73
End: 2023-06-23

## 2023-06-23 NOTE — TELEPHONE ENCOUNTER
Pepito Piña, APRN - NP  You 15 minutes ago (11:14 AM)     AL   re: holding anticoagulation for procedure. May hold  Xarelto for 3 days prior. Restart anticoagulation per surgeon, as soon as possible.

## 2023-06-23 NOTE — TELEPHONE ENCOUNTER
From: Daylene Mcardle  To: Dr. August Olp: 6/23/2023 9:46 AM EDT  Subject: Quarter zone shot    They are asking me to discontinue the blood thinners ( Xarelto) before the quarter zone shot in my shoulder. 4 to 5 days before ?     Please advise, thank you

## 2023-06-27 ENCOUNTER — OFFICE VISIT (OUTPATIENT)
Age: 73
End: 2023-06-27
Payer: MEDICARE

## 2023-06-27 ENCOUNTER — HOSPITAL ENCOUNTER (OUTPATIENT)
Facility: HOSPITAL | Age: 73
Discharge: HOME | End: 2023-06-30
Attending: INTERNAL MEDICINE
Payer: MEDICARE

## 2023-06-27 ENCOUNTER — TELEPHONE (OUTPATIENT)
Age: 73
End: 2023-06-27

## 2023-06-27 VITALS
HEIGHT: 66 IN | DIASTOLIC BLOOD PRESSURE: 88 MMHG | SYSTOLIC BLOOD PRESSURE: 160 MMHG | TEMPERATURE: 97.8 F | WEIGHT: 181.6 LBS | BODY MASS INDEX: 29.18 KG/M2 | HEART RATE: 55 BPM | RESPIRATION RATE: 14 BRPM | OXYGEN SATURATION: 99 %

## 2023-06-27 DIAGNOSIS — S09.90XA HEAD TRAUMA, INITIAL ENCOUNTER: ICD-10-CM
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
## 2023-06-27 DIAGNOSIS — M54.2 NECK PAIN, BILATERAL: Primary | ICD-10-CM

## 2023-06-27 DIAGNOSIS — T79.8XXA OTHER EARLY COMPLICATIONS OF TRAUMA, INITIAL ENCOUNTER (HCC): ICD-10-CM

## 2023-06-27 DIAGNOSIS — M54.2 NECK PAIN, BILATERAL: ICD-10-CM

## 2023-06-27 DIAGNOSIS — I47.1 ATRIAL TACHYCARDIA (HCC): ICD-10-CM

## 2023-06-27 PROCEDURE — 3077F SYST BP >= 140 MM HG: CPT | Performed by: INTERNAL MEDICINE

## 2023-06-27 PROCEDURE — 99214 OFFICE O/P EST MOD 30 MIN: CPT | Performed by: INTERNAL MEDICINE

## 2023-06-27 PROCEDURE — 3017F COLORECTAL CA SCREEN DOC REV: CPT | Performed by: INTERNAL MEDICINE

## 2023-06-27 PROCEDURE — 3079F DIAST BP 80-89 MM HG: CPT | Performed by: INTERNAL MEDICINE

## 2023-06-27 PROCEDURE — 1123F ACP DISCUSS/DSCN MKR DOCD: CPT | Performed by: INTERNAL MEDICINE

## 2023-06-27 PROCEDURE — G8427 DOCREV CUR MEDS BY ELIG CLIN: HCPCS | Performed by: INTERNAL MEDICINE

## 2023-06-27 PROCEDURE — G8419 CALC BMI OUT NRM PARAM NOF/U: HCPCS | Performed by: INTERNAL MEDICINE

## 2023-06-27 PROCEDURE — 72125 CT NECK SPINE W/O DYE: CPT

## 2023-06-27 PROCEDURE — 70450 CT HEAD/BRAIN W/O DYE: CPT

## 2023-06-27 PROCEDURE — 1036F TOBACCO NON-USER: CPT | Performed by: INTERNAL MEDICINE

## 2023-06-29 RX ORDER — METHOCARBAMOL 500 MG/1
500 TABLET, FILM COATED ORAL 3 TIMES DAILY PRN
Qty: 30 TABLET | Refills: 0 | Status: SHIPPED | OUTPATIENT
Start: 2023-06-29 | End: 2023-07-09

## 2023-07-05 ENCOUNTER — PATIENT MESSAGE (OUTPATIENT)
Age: 73
End: 2023-07-05

## 2023-07-05 DIAGNOSIS — M54.2 NECK PAIN: Primary | ICD-10-CM

## 2023-07-05 NOTE — TELEPHONE ENCOUNTER
From: Jonathan Jeffries  To: Dr. Eligio Olson: 7/5/2023 9:02 AM EDT  Subject: PT    Doc, sorry to bother you but is it possible to order a request for PT for my neck. [facility] , [place].  Thank you for your help

## 2023-07-06 ENCOUNTER — HOSPITAL ENCOUNTER (OUTPATIENT)
Facility: HOSPITAL | Age: 73
Discharge: HOME | End: 2023-07-06
Attending: ORTHOPAEDIC SURGERY
Payer: MEDICARE

## 2023-07-06 DIAGNOSIS — M19.011 GLENOHUMERAL ARTHRITIS, RIGHT: ICD-10-CM

## 2023-07-06 PROCEDURE — 6360000002 HC RX W HCPCS: Performed by: STUDENT IN AN ORGANIZED HEALTH CARE EDUCATION/TRAINING PROGRAM

## 2023-07-06 PROCEDURE — 6360000004 HC RX CONTRAST MEDICATION: Performed by: STUDENT IN AN ORGANIZED HEALTH CARE EDUCATION/TRAINING PROGRAM

## 2023-07-06 PROCEDURE — 20605 DRAIN/INJ JOINT/BURSA W/O US: CPT

## 2023-07-06 RX ORDER — BUPIVACAINE HYDROCHLORIDE 5 MG/ML
5 INJECTION, SOLUTION EPIDURAL; INTRACAUDAL ONCE
Status: COMPLETED | OUTPATIENT
Start: 2023-07-06 | End: 2023-07-06

## 2023-07-06 RX ORDER — TRIAMCINOLONE ACETONIDE 40 MG/ML
40 INJECTION, SUSPENSION INTRA-ARTICULAR; INTRAMUSCULAR ONCE
Status: COMPLETED | OUTPATIENT
Start: 2023-07-06 | End: 2023-07-06

## 2023-07-06 RX ADMIN — IOHEXOL 3 ML: 240 INJECTION, SOLUTION INTRATHECAL; INTRAVASCULAR; INTRAVENOUS; ORAL at 12:35

## 2023-07-06 RX ADMIN — TRIAMCINOLONE ACETONIDE 40 MG: 40 INJECTION, SUSPENSION INTRA-ARTICULAR; INTRAMUSCULAR at 12:36

## 2023-07-06 RX ADMIN — BUPIVACAINE HYDROCHLORIDE 5 MG: 5 INJECTION, SOLUTION EPIDURAL; INTRACAUDAL; PERINEURAL at 12:33

## 2023-07-10 ENCOUNTER — HOSPITAL ENCOUNTER (OUTPATIENT)
Age: 73
Discharge: HOME | End: 2023-07-13
Payer: MEDICARE

## 2023-07-10 ENCOUNTER — TELEPHONE (OUTPATIENT)
Age: 73
End: 2023-07-10

## 2023-07-10 DIAGNOSIS — Z79.899 ON AMIODARONE THERAPY: ICD-10-CM

## 2023-07-10 PROCEDURE — 71046 X-RAY EXAM CHEST 2 VIEWS: CPT

## 2023-07-10 NOTE — TELEPHONE ENCOUNTER
Reason for call: The patient would like to get PT for his neck pain.  Please call to discuss    Is this a new problem: Yes    Date of last appointment:  6/27/2023     Can we respond via [facility]: No    Best call back [phone]

## 2023-07-11 DIAGNOSIS — Z79.899 ON AMIODARONE THERAPY: ICD-10-CM

## 2023-07-11 LAB
ALBUMIN SERPL-MCNC: 3.9 G/DL (ref 3.5–5)
ALBUMIN/GLOB SERPL: 1.3 (ref 1.1–2.2)
ALP SERPL-CCNC: 49 U/L (ref 45–117)
ALT SERPL-CCNC: 29 U/L (ref 12–78)
AST SERPL-CCNC: 18 U/L (ref 15–37)
BILIRUB DIRECT SERPL-MCNC: 0.2 MG/DL (ref 0–0.2)
BILIRUB SERPL-MCNC: 0.5 MG/DL (ref 0.2–1)
GLOBULIN SER CALC-MCNC: 3.1 G/DL (ref 2–4)
PROT SERPL-MCNC: 7 G/DL (ref 6.4–8.2)
TSH SERPL DL<=0.05 MIU/L-ACNC: 2.23 UIU/ML (ref 0.36–3.74)

## 2023-07-25 ENCOUNTER — ANCILLARY PROCEDURE (OUTPATIENT)
Age: 73
End: 2023-07-25
Payer: MEDICARE

## 2023-07-25 VITALS
SYSTOLIC BLOOD PRESSURE: 160 MMHG | DIASTOLIC BLOOD PRESSURE: 88 MMHG | HEIGHT: 65 IN | BODY MASS INDEX: 30.16 KG/M2 | WEIGHT: 181 LBS

## 2023-07-25 DIAGNOSIS — I48.91 A-FIB (HCC): ICD-10-CM

## 2023-07-25 PROCEDURE — 93306 TTE W/DOPPLER COMPLETE: CPT

## 2023-07-26 LAB
ECHO AO ASC DIAM: 3.4 CM
ECHO AO ASCENDING AORTA INDEX: 1.79 CM/M2
ECHO AO ROOT DIAM: 3.1 CM
ECHO AO ROOT INDEX: 1.63 CM/M2
ECHO AR MAX VEL PISA: 3 M/S
ECHO AV MEAN GRADIENT: 3 MMHG
ECHO AV MEAN VELOCITY: 0.9 M/S
ECHO AV PEAK GRADIENT: 8 MMHG
ECHO AV PEAK VELOCITY: 1.4 M/S
ECHO AV REGURGITANT PHT: 1156.5 MILLISECOND
ECHO AV VELOCITY RATIO: 0.93
ECHO AV VTI: 26.1 CM
ECHO BSA: 1.94 M2
ECHO EST RA PRESSURE: 8 MMHG
ECHO LA DIAMETER INDEX: 1.79 CM/M2
ECHO LA DIAMETER: 3.4 CM
ECHO LA TO AORTIC ROOT RATIO: 1.1
ECHO LA VOL 2C: 51 ML (ref 18–58)
ECHO LA VOL 4C: 59 ML (ref 18–58)
ECHO LA VOL BP: 57 ML (ref 18–58)
ECHO LA VOL/BSA BIPLANE: 30 ML/M2 (ref 16–34)
ECHO LA VOLUME AREA LENGTH: 61 ML
ECHO LA VOLUME INDEX A2C: 27 ML/M2 (ref 16–34)
ECHO LA VOLUME INDEX A4C: 31 ML/M2 (ref 16–34)
ECHO LA VOLUME INDEX AREA LENGTH: 32 ML/M2 (ref 16–34)
ECHO LV E' LATERAL VELOCITY: 13 CM/S
ECHO LV E' SEPTAL VELOCITY: 10 CM/S
ECHO LV EDV A2C: 67 ML
ECHO LV EDV A4C: 100 ML
ECHO LV EDV BP: 84 ML (ref 67–155)
ECHO LV EDV INDEX A4C: 53 ML/M2
ECHO LV EDV INDEX BP: 44 ML/M2
ECHO LV EDV NDEX A2C: 35 ML/M2
ECHO LV EJECTION FRACTION A2C: 63 %
ECHO LV EJECTION FRACTION A4C: 50 %
ECHO LV EJECTION FRACTION BIPLANE: 56 % (ref 55–100)
ECHO LV ESV A2C: 24 ML
ECHO LV ESV A4C: 50 ML
ECHO LV ESV BP: 37 ML (ref 22–58)
ECHO LV ESV INDEX A2C: 13 ML/M2
ECHO LV ESV INDEX A4C: 26 ML/M2
ECHO LV ESV INDEX BP: 19 ML/M2
ECHO LV FRACTIONAL SHORTENING: 25 % (ref 28–44)
ECHO LV INTERNAL DIMENSION DIASTOLE INDEX: 1.89 CM/M2
ECHO LV INTERNAL DIMENSION DIASTOLIC: 3.6 CM (ref 4.2–5.9)
ECHO LV INTERNAL DIMENSION SYSTOLIC INDEX: 1.42 CM/M2
ECHO LV INTERNAL DIMENSION SYSTOLIC: 2.7 CM
ECHO LV IVSD: 1 CM (ref 0.6–1)
ECHO LV MASS 2D: 107.9 G (ref 88–224)
ECHO LV MASS INDEX 2D: 56.8 G/M2 (ref 49–115)
ECHO LV POSTERIOR WALL DIASTOLIC: 1 CM (ref 0.6–1)
ECHO LV RELATIVE WALL THICKNESS RATIO: 0.56
ECHO LVOT AV VTI INDEX: 0.95
ECHO LVOT MEAN GRADIENT: 3 MMHG
ECHO LVOT PEAK GRADIENT: 7 MMHG
ECHO LVOT PEAK VELOCITY: 1.3 M/S
ECHO LVOT VTI: 24.7 CM
ECHO MV A VELOCITY: 0.62 M/S
ECHO MV AREA PHT: 3.5 CM2
ECHO MV E DECELERATION TIME (DT): 214.2 MS
ECHO MV E VELOCITY: 0.66 M/S
ECHO MV E/A RATIO: 1.06
ECHO MV E/E' LATERAL: 5.08
ECHO MV E/E' RATIO (AVERAGED): 5.84
ECHO MV E/E' SEPTAL: 6.6
ECHO MV PRESSURE HALF TIME (PHT): 62.1 MS
ECHO RA AREA 4C: 18.9 CM2
ECHO RA END SYSTOLIC VOLUME APICAL 4 CHAMBER INDEX BSA: 30 ML/M2
ECHO RA VOLUME AREA LENGTH APICAL 4 CHAMBER: 57 ML
ECHO RA VOLUME: 57 ML
ECHO RIGHT VENTRICULAR SYSTOLIC PRESSURE (RVSP): 28 MMHG
ECHO RV FREE WALL PEAK S': 12 CM/S
ECHO RV TAPSE: 2 CM (ref 1.7–?)
ECHO TV REGURGITANT MAX VELOCITY: 2.25 M/S
ECHO TV REGURGITANT PEAK GRADIENT: 20 MMHG

## 2023-08-01 ENCOUNTER — OFFICE VISIT (OUTPATIENT)
Age: 73
End: 2023-08-01
Payer: MEDICARE

## 2023-08-01 VITALS
BODY MASS INDEX: 30.22 KG/M2 | OXYGEN SATURATION: 96 % | SYSTOLIC BLOOD PRESSURE: 128 MMHG | WEIGHT: 181.4 LBS | RESPIRATION RATE: 16 BRPM | HEIGHT: 65 IN | HEART RATE: 64 BPM | DIASTOLIC BLOOD PRESSURE: 70 MMHG

## 2023-08-01 DIAGNOSIS — I10 PRIMARY HYPERTENSION: Primary | ICD-10-CM

## 2023-08-01 DIAGNOSIS — I48.0 PAROXYSMAL ATRIAL FIBRILLATION (HCC): ICD-10-CM

## 2023-08-01 PROCEDURE — 93010 ELECTROCARDIOGRAM REPORT: CPT | Performed by: SPECIALIST

## 2023-08-01 PROCEDURE — 99214 OFFICE O/P EST MOD 30 MIN: CPT | Performed by: SPECIALIST

## 2023-08-01 PROCEDURE — G8417 CALC BMI ABV UP PARAM F/U: HCPCS | Performed by: SPECIALIST

## 2023-08-01 PROCEDURE — 3017F COLORECTAL CA SCREEN DOC REV: CPT | Performed by: SPECIALIST

## 2023-08-01 PROCEDURE — G8427 DOCREV CUR MEDS BY ELIG CLIN: HCPCS | Performed by: SPECIALIST

## 2023-08-01 PROCEDURE — 1036F TOBACCO NON-USER: CPT | Performed by: SPECIALIST

## 2023-08-01 PROCEDURE — 1123F ACP DISCUSS/DSCN MKR DOCD: CPT | Performed by: SPECIALIST

## 2023-08-01 PROCEDURE — 3078F DIAST BP <80 MM HG: CPT | Performed by: SPECIALIST

## 2023-08-01 PROCEDURE — 3074F SYST BP LT 130 MM HG: CPT | Performed by: SPECIALIST

## 2023-08-01 PROCEDURE — 93005 ELECTROCARDIOGRAM TRACING: CPT | Performed by: SPECIALIST

## 2023-09-06 ENCOUNTER — HOSPITAL ENCOUNTER (OUTPATIENT)
Facility: HOSPITAL | Age: 73
Discharge: HOME | End: 2023-09-09
Attending: ORTHOPAEDIC SURGERY
Payer: MEDICARE

## 2023-09-06 DIAGNOSIS — M25.511 CHRONIC RIGHT SHOULDER PAIN: ICD-10-CM

## 2023-09-06 DIAGNOSIS — G89.29 CHRONIC RIGHT SHOULDER PAIN: ICD-10-CM

## 2023-09-06 PROCEDURE — 73200 CT UPPER EXTREMITY W/O DYE: CPT

## 2023-09-12 RX ORDER — LOSARTAN POTASSIUM 50 MG/1
50 TABLET ORAL DAILY
Qty: 90 TABLET | Refills: 1 | Status: SHIPPED | OUTPATIENT
Start: 2023-09-12

## 2023-09-12 NOTE — TELEPHONE ENCOUNTER
Cardiologist: Dr. Henrik Ang   Date Time Provider [address]   9/21/2023 10:10 AM MARIELA Márquez - NP [facility] BS AMB   12/28/2023  9:30 AM MD STEPHANE Arnold BS AMB   2/2/2024 10:20 AM MD SKIP Wilde BS AMB   6/10/2024  1:15 PM MD STEPHANE Arnold BS AMB       Requested Prescriptions     Signed Prescriptions Disp Refills    losartan (COZAAR) 50 MG tablet 90 tablet 1     Sig: Take 1 tablet by mouth once daily     Authorizing Provider: Jamison Rader     Ordering User: GILLES PRATT         Refills VO per Dr. Jessica Mcgill.

## 2023-09-21 ENCOUNTER — CLINICAL DOCUMENTATION (OUTPATIENT)
Age: 73
End: 2023-09-21

## 2023-09-21 ENCOUNTER — OFFICE VISIT (OUTPATIENT)
Age: 73
End: 2023-09-21

## 2023-09-21 VITALS
OXYGEN SATURATION: 94 % | WEIGHT: 181 LBS | TEMPERATURE: 96.6 F | HEIGHT: 66 IN | SYSTOLIC BLOOD PRESSURE: 123 MMHG | DIASTOLIC BLOOD PRESSURE: 76 MMHG | HEART RATE: 52 BPM | BODY MASS INDEX: 29.09 KG/M2

## 2023-09-21 DIAGNOSIS — G47.33 OSA (OBSTRUCTIVE SLEEP APNEA): Primary | ICD-10-CM

## 2023-09-21 DIAGNOSIS — I10 PRIMARY HYPERTENSION: ICD-10-CM

## 2023-09-21 RX ORDER — HYDROCHLOROTHIAZIDE 25 MG/1
TABLET ORAL
Qty: 90 TABLET | Refills: 1 | Status: SHIPPED | OUTPATIENT
Start: 2023-09-21

## 2023-09-21 ASSESSMENT — SLEEP AND FATIGUE QUESTIONNAIRES
HOW LIKELY ARE YOU TO NOD OFF OR FALL ASLEEP WHILE SITTING INACTIVE IN A PUBLIC PLACE: 0
HOW LIKELY ARE YOU TO NOD OFF OR FALL ASLEEP WHILE SITTING AND TALKING TO SOMEONE: 0
HOW LIKELY ARE YOU TO NOD OFF OR FALL ASLEEP WHILE LYING DOWN TO REST IN THE AFTERNOON WHEN CIRCUMSTANCES PERMIT: 1
HOW LIKELY ARE YOU TO NOD OFF OR FALL ASLEEP WHILE SITTING AND READING: 2
HOW LIKELY ARE YOU TO NOD OFF OR FALL ASLEEP WHILE SITTING QUIETLY AFTER LUNCH WITHOUT ALCOHOL: 0
HOW LIKELY ARE YOU TO NOD OFF OR FALL ASLEEP WHEN YOU ARE A PASSENGER IN A CAR FOR AN HOUR WITHOUT A BREAK: 0
HOW LIKELY ARE YOU TO NOD OFF OR FALL ASLEEP IN A CAR, WHILE STOPPED FOR A FEW MINUTES IN TRAFFIC: 0
HOW LIKELY ARE YOU TO NOD OFF OR FALL ASLEEP WHILE WATCHING TV: 0
ESS TOTAL SCORE: 3

## 2023-09-21 NOTE — TELEPHONE ENCOUNTER
Requested Prescriptions     Signed Prescriptions Disp Refills    hydroCHLOROthiazide (HYDRODIURIL) 25 MG tablet 90 tablet 1     Sig: Take 1 tablet by mouth once daily     Authorizing Provider: Steven Miguel     Ordering User: Samaria Hoffman     Verbal order per Dr. Donnell Aguilera.     Future Appointments   Date Time Provider [address]   9/21/2023 10:10 AM [facility]   12/28/2023  9:30 AM MD STEPHANE Rodriguez BS AMB   2/2/2024 10:20 AM MD SKIP Carbajal BS AMB   6/10/2024  1:15 PM MD STEPHANE Rodriguez BS AMB

## 2023-09-21 NOTE — PATIENT INSTRUCTIONS
[address].Jos[place], [address]  Tel.  [phone]  Fax. [address] [facility], [address] [place] [address]  Tel.  [phone]  Fax. [phone] [address]  Tel.  [phone]  Fax. [phone]     Learning About CPAP for Sleep Apnea  What is CPAP? CPAP is a small machine that you use at home every night while you sleep. It increases air pressure in your throat to keep your airway open. When you have sleep apnea, this can help you sleep better so you feel much better. CPAP stands for \"continuous positive airway pressure. \"  The CPAP machine will have one of the following:  A mask that covers your nose and mouth  Prongs that fit into your nose  A mask that covers your nose only, the most common type. This type is called NCPAP. The N stands for \"nasal.\"  Why is it done? CPAP is usually the best treatment for obstructive sleep apnea. It is the first treatment choice and the most widely used. Your doctor may suggest CPAP if you have: Moderate to severe sleep apnea. Sleep apnea and coronary artery disease (CAD) or heart failure. How does it help? CPAP can help you have more normal sleep, so you feel less sleepy and more alert during the daytime. CPAP may help keep heart failure or other heart problems from getting worse. NCPAP may help lower your blood pressure. If you use CPAP, your bed partner may also sleep better because you are not snoring or restless. What are the side effects? Some people who use CPAP have:  A dry or stuffy nose and a sore throat. Irritated skin on the face. Sore eyes. Bloating. If you have any of these problems, work with your doctor to fix them. Here are some things you can try:  Be sure the mask or nasal prongs fit well. See if your doctor can adjust the pressure of your CPAP. If your nose is dry, try a humidifier.   If your nose is runny or stuffy, try decongestant medicine or a steroid

## 2023-10-17 ENCOUNTER — PATIENT MESSAGE (OUTPATIENT)
Age: 73
End: 2023-10-17

## 2023-10-31 ENCOUNTER — CLINICAL DOCUMENTATION (OUTPATIENT)
Age: 73
End: 2023-10-31

## 2023-10-31 NOTE — PROGRESS NOTES
Pap supply order faxed to [facility] on 10/31/2023 as an urgent request, patient requested dme be switched [employer] has been giving him a difficult times trying to get pap supplies since March 2023.

## 2023-12-27 ENCOUNTER — HOSPITAL ENCOUNTER (OUTPATIENT)
Facility: HOSPITAL | Age: 73
Discharge: HOME | End: 2023-12-30
Payer: MEDICARE

## 2023-12-27 VITALS
HEART RATE: 52 BPM | TEMPERATURE: 97.7 F | RESPIRATION RATE: 18 BRPM | BODY MASS INDEX: 29.02 KG/M2 | WEIGHT: 180.56 LBS | DIASTOLIC BLOOD PRESSURE: 83 MMHG | HEIGHT: 66 IN | SYSTOLIC BLOOD PRESSURE: 137 MMHG

## 2023-12-27 LAB
ABO + RH BLD: NORMAL
ANION GAP SERPL CALC-SCNC: 2 MMOL/L (ref 5–15)
APPEARANCE UR: CLEAR
BACTERIA URNS QL MICRO: NEGATIVE /HPF
BILIRUB UR QL: NEGATIVE
BLOOD GROUP ANTIBODIES SERPL: NORMAL
BUN SERPL-MCNC: 12 MG/DL (ref 6–20)
BUN/CREAT SERPL: 14 (ref 12–20)
CALCIUM SERPL-MCNC: 9.2 MG/DL (ref 8.5–10.1)
CHLORIDE SERPL-SCNC: 107 MMOL/L (ref 97–108)
CO2 SERPL-SCNC: 31 MMOL/L (ref 21–32)
COLOR UR: NORMAL
CREAT SERPL-MCNC: 0.87 MG/DL (ref 0.7–1.3)
EPITH CASTS URNS QL MICRO: NORMAL /LPF
ERYTHROCYTE [DISTWIDTH] IN BLOOD BY AUTOMATED COUNT: 14.5 % (ref 11.5–14.5)
EST. AVERAGE GLUCOSE BLD GHB EST-MCNC: 108 MG/DL
GLUCOSE SERPL-MCNC: 90 MG/DL (ref 65–100)
GLUCOSE UR STRIP.AUTO-MCNC: NEGATIVE MG/DL
HBA1C MFR BLD: 5.4 % (ref 4–5.6)
HCT VFR BLD AUTO: 44.4 % (ref 36.6–50.3)
HGB BLD-MCNC: 14.9 G/DL (ref 12.1–17)
HGB UR QL STRIP: NEGATIVE
HYALINE CASTS URNS QL MICRO: NORMAL /LPF (ref 0–5)
INR PPP: 1.2 (ref 0.9–1.1)
KETONES UR QL STRIP.AUTO: NEGATIVE MG/DL
LEUKOCYTE ESTERASE UR QL STRIP.AUTO: NEGATIVE
MCH RBC QN AUTO: 29.4 PG (ref 26–34)
MCHC RBC AUTO-ENTMCNC: 33.6 G/DL (ref 30–36.5)
MCV RBC AUTO: 87.6 FL (ref 80–99)
NITRITE UR QL STRIP.AUTO: NEGATIVE
NRBC # BLD: 0 K/UL (ref 0–0.01)
NRBC BLD-RTO: 0 PER 100 WBC
PH UR STRIP: 7 (ref 5–8)
PLATELET # BLD AUTO: 319 K/UL (ref 150–400)
PMV BLD AUTO: 10.9 FL (ref 8.9–12.9)
POTASSIUM SERPL-SCNC: 4.4 MMOL/L (ref 3.5–5.1)
PROT UR STRIP-MCNC: NEGATIVE MG/DL
PROTHROMBIN TIME: 12.4 SEC (ref 9–11.1)
RBC # BLD AUTO: 5.07 M/UL (ref 4.1–5.7)
RBC #/AREA URNS HPF: NORMAL /HPF (ref 0–5)
SODIUM SERPL-SCNC: 140 MMOL/L (ref 136–145)
SP GR UR REFRACTOMETRY: 1.01 (ref 1–1.03)
SPECIMEN EXP DATE BLD: NORMAL
URINE CULTURE IF INDICATED: NORMAL
UROBILINOGEN UR QL STRIP.AUTO: 0.2 EU/DL (ref 0.2–1)
WBC # BLD AUTO: 7.3 K/UL (ref 4.1–11.1)
WBC URNS QL MICRO: NORMAL /HPF (ref 0–4)

## 2023-12-27 PROCEDURE — 85027 COMPLETE CBC AUTOMATED: CPT

## 2023-12-27 PROCEDURE — 80048 BASIC METABOLIC PNL TOTAL CA: CPT

## 2023-12-27 PROCEDURE — 86850 RBC ANTIBODY SCREEN: CPT

## 2023-12-27 PROCEDURE — 86900 BLOOD TYPING SEROLOGIC ABO: CPT

## 2023-12-27 PROCEDURE — APPNB30 APP NON BILLABLE TIME 0-30 MINS: Performed by: NURSE PRACTITIONER

## 2023-12-27 PROCEDURE — 86901 BLOOD TYPING SEROLOGIC RH(D): CPT

## 2023-12-27 PROCEDURE — 85610 PROTHROMBIN TIME: CPT

## 2023-12-27 PROCEDURE — 36415 COLL VENOUS BLD VENIPUNCTURE: CPT

## 2023-12-27 PROCEDURE — 81001 URINALYSIS AUTO W/SCOPE: CPT

## 2023-12-27 PROCEDURE — 83036 HEMOGLOBIN GLYCOSYLATED A1C: CPT

## 2023-12-27 RX ORDER — M-VIT,TX,IRON,MINS/CALC/FOLIC 27MG-0.4MG
1 TABLET ORAL DAILY
COMMUNITY

## 2023-12-27 NOTE — PERIOP NOTE
PAT APPT 12/27/23 @ 9:30  
sheets the night before surgery. Do not allow pets to sleep with you.        Day of Procedure    Please park in the parking deck or any designated visitor parking lot.   Enter the facility through the Main Entrance of the hospital.  On the day of surgery, please provide the cell phone number of the person who will be waiting for you to the Patient Access representative at the time of registration.  Masks are highly recommended in the hospital, but not required.  Once your procedure and the immediate recovery period is completed, a nurse in the recovery area will contact your designated visitor to inform them of your room number or to otherwise review other pertinent information regarding your care.    Social distancing practices are strongly encouraged in waiting areas and the cafeteria.       The patient was contacted in person.   He verbalized understanding of all instructions does not need reinforcement.

## 2023-12-27 NOTE — PERIOP NOTE
Mupirocin ointment to be used in each nostril twice a day for 5 days. Showering with Chlorhexidine (CHG) liquid soap for 5 days prior to surgery. How to use Mupirocin ointment in your nose   the prescription from your pharmacy. You will receive a large tube of ointment which will be big enough for all of your treatments. You will apply this ointment to each nostril 2  times a day for 5 days. Wash your hands with  gel or soap and water for 20 seconds before using ointment. Place a pea-sized amount of ointment on a cotton Q-tip. Apply ointment just inside of each nostril with the Q-tip. Do not push Q-tip or ointment deep inside you nose. Press your nostrils together and massage for a few seconds. Wash your hands with  gel or soap and water after you are finished. Do not get ointment near your eyes. If it gets into your eyes, rinse them with cool water. If you need to use nasal spray, clean the tip of the bottle with alcohol before use and do not use both at the same time. If you are scheduled for COVID testing during the 5 days, do NOT apply morning dose until after the COVID test has been performed. How to use Chlorhexidine (CHG) 4% liquid soap  Purchase an 8 ounce bottle of CHG liquid soap (Chlorhexidine 4%, Hibiclens, Hex-A-Clens or store brand) at a pharmacy or grocery store. Wash your hair with your normal shampoo and your body with regular soap and rinse well to remove shampoo and soap from your skin. Wet a clean washcloth and turn off the shower. Put CHG soap on washcloth and apply to your entire body from the neck down. Do not use on your head, face or private parts(genitals). Do not use CHG soap on open sores, wounds or areas of skin irritation. Wash your body gently for 5 minutes. Do not wash your skin too hard. This soap does not create lather. Pay special attention to your underarms and from your belly button to your feet.   Turn the shower back on and rinse

## 2023-12-28 ENCOUNTER — OFFICE VISIT (OUTPATIENT)
Age: 73
End: 2023-12-28
Payer: MEDICARE

## 2023-12-28 ENCOUNTER — PATIENT MESSAGE (OUTPATIENT)
Age: 73
End: 2023-12-28

## 2023-12-28 VITALS
WEIGHT: 181.6 LBS | DIASTOLIC BLOOD PRESSURE: 86 MMHG | SYSTOLIC BLOOD PRESSURE: 124 MMHG | HEART RATE: 63 BPM | TEMPERATURE: 97.6 F | BODY MASS INDEX: 28.5 KG/M2 | RESPIRATION RATE: 16 BRPM | HEIGHT: 67 IN | OXYGEN SATURATION: 97 %

## 2023-12-28 DIAGNOSIS — R73.01 IMPAIRED FASTING GLUCOSE: ICD-10-CM

## 2023-12-28 DIAGNOSIS — Z79.899 ON AMIODARONE THERAPY: Primary | ICD-10-CM

## 2023-12-28 DIAGNOSIS — I47.19 ATRIAL TACHYCARDIA: ICD-10-CM

## 2023-12-28 DIAGNOSIS — I10 PRIMARY HYPERTENSION: ICD-10-CM

## 2023-12-28 DIAGNOSIS — M19.011 LOCALIZED OSTEOARTHRITIS OF RIGHT SHOULDER: Primary | ICD-10-CM

## 2023-12-28 LAB
BACTERIA SPEC CULT: NORMAL
BACTERIA SPEC CULT: NORMAL
SERVICE CMNT-IMP: NORMAL

## 2023-12-28 PROCEDURE — G8427 DOCREV CUR MEDS BY ELIG CLIN: HCPCS | Performed by: INTERNAL MEDICINE

## 2023-12-28 PROCEDURE — 3079F DIAST BP 80-89 MM HG: CPT | Performed by: INTERNAL MEDICINE

## 2023-12-28 PROCEDURE — G8484 FLU IMMUNIZE NO ADMIN: HCPCS | Performed by: INTERNAL MEDICINE

## 2023-12-28 PROCEDURE — 99214 OFFICE O/P EST MOD 30 MIN: CPT | Performed by: INTERNAL MEDICINE

## 2023-12-28 PROCEDURE — 3074F SYST BP LT 130 MM HG: CPT | Performed by: INTERNAL MEDICINE

## 2023-12-28 PROCEDURE — 1123F ACP DISCUSS/DSCN MKR DOCD: CPT | Performed by: INTERNAL MEDICINE

## 2023-12-28 PROCEDURE — 3017F COLORECTAL CA SCREEN DOC REV: CPT | Performed by: INTERNAL MEDICINE

## 2023-12-28 PROCEDURE — 1036F TOBACCO NON-USER: CPT | Performed by: INTERNAL MEDICINE

## 2023-12-28 PROCEDURE — G8417 CALC BMI ABV UP PARAM F/U: HCPCS | Performed by: INTERNAL MEDICINE

## 2023-12-28 RX ORDER — HYDROCHLOROTHIAZIDE 25 MG/1
25 TABLET ORAL EVERY OTHER DAY
COMMUNITY
Start: 2023-12-28

## 2023-12-28 NOTE — PROGRESS NOTES
Date of Exam: 2023    Jane Taveras is a 74 y.o. male (:1950) who presents for preoperative evaluation. Procedure/Surgery:Right Shoulder Reverse Replacement  Date of Procedure/Surgery: 1/3/2024  Surgeon: [facility]/Surgical Facility: [facility]  Primary Physician: Monika Cason MD  Latex Allergy: No    Current Outpatient Medications   Medication Sig Dispense Refill    hydroCHLOROthiazide (HYDRODIURIL) 25 MG tablet Take 1 tablet by mouth every other day      Multiple Vitamins-Minerals (THERAPEUTIC MULTIVITAMIN-MINERALS) tablet Take 1 tablet by mouth daily      losartan (COZAAR) 50 MG tablet Take 1 tablet by mouth once daily 90 tablet 1    b complex vitamins capsule Take 1 capsule by mouth daily 30 capsule 3    amiodarone (PACERONE) 100 MG tablet Take 0.5 tablets by mouth daily      cetirizine (ZYRTEC) 10 MG tablet Take 1 tablet by mouth daily as needed      rivaroxaban (XARELTO) 20 MG TABS tablet Take by mouth Daily with supper      sildenafil (VIAGRA) 100 MG tablet Take by mouth daily as needed       No current facility-administered medications for this visit. Past Medical History:   Diagnosis Date    A-fib [facility])     Arrhythmia 2021    Cardioverted     LUIS CARLOS (generalized anxiety disorder) 2019    RESOLVED    Hypertension     OA (osteoarthritis) 2011    Sleep apnea         Past Surgical History:   Procedure Laterality Date    CATARACT REMOVAL Left     left     CATARACT REMOVAL Right 2018    COLONOSCOPY      ENDOSCOPY, COLON, DIAGNOSTIC      HEENT Right     Yag Capsulotomy    KNEE SURGERY Left 2022    LEFT TOTAL KNEE    KNEE SURGERY  89    23YEARS OLD, CARTILAGE SCRAPED PER PT    CO ANESTH,KNEE AREA SURGERY   1966    TOTAL HIP ARTHROPLASTY Right  and ?     Right x2    TOTAL HIP ARTHROPLASTY Left 1999    WISDOM TOOTH EXTRACTION          Tetanus up to date: last tetanus booster within 10 years      Anesthesia Complications: no  History of abnormal

## 2024-01-03 ENCOUNTER — HOSPITAL ENCOUNTER (OUTPATIENT)
Age: 74
Discharge: HOME | End: 2024-01-06
Payer: MEDICARE

## 2024-01-03 DIAGNOSIS — Z79.899 ON AMIODARONE THERAPY: ICD-10-CM

## 2024-01-03 PROCEDURE — 71046 X-RAY EXAM CHEST 2 VIEWS: CPT

## 2024-01-04 ENCOUNTER — HOSPITAL ENCOUNTER (OUTPATIENT)
Facility: HOSPITAL | Age: 74
Setting detail: OBSERVATION
Discharge: HOME HEALTH | End: 2024-01-05
Attending: ORTHOPAEDIC SURGERY | Admitting: ORTHOPAEDIC SURGERY
Payer: MEDICARE

## 2024-01-04 ENCOUNTER — ANESTHESIA (OUTPATIENT)
Facility: HOSPITAL | Age: 74
End: 2024-01-04
Payer: MEDICARE

## 2024-01-04 ENCOUNTER — ANESTHESIA EVENT (OUTPATIENT)
Facility: HOSPITAL | Age: 74
End: 2024-01-04
Payer: MEDICARE

## 2024-01-04 DIAGNOSIS — M19.211 SECONDARY OSTEOARTHRITIS OF RIGHT SHOULDER DUE TO ROTATOR CUFF ARTHROPATHY: Primary | ICD-10-CM

## 2024-01-04 PROBLEM — M75.101 RIGHT ROTATOR CUFF TEAR ARTHROPATHY: Status: ACTIVE | Noted: 2024-01-04

## 2024-01-04 PROBLEM — M12.811 RIGHT ROTATOR CUFF TEAR ARTHROPATHY: Status: ACTIVE | Noted: 2024-01-04

## 2024-01-04 LAB
GLUCOSE BLD STRIP.AUTO-MCNC: 83 MG/DL (ref 65–117)
SERVICE CMNT-IMP: NORMAL

## 2024-01-04 PROCEDURE — 82962 GLUCOSE BLOOD TEST: CPT

## 2024-01-04 PROCEDURE — 2580000003 HC RX 258: Performed by: NURSE ANESTHETIST, CERTIFIED REGISTERED

## 2024-01-04 PROCEDURE — 6360000002 HC RX W HCPCS: Performed by: ANESTHESIOLOGY

## 2024-01-04 PROCEDURE — G0378 HOSPITAL OBSERVATION PER HR: HCPCS

## 2024-01-04 PROCEDURE — 64415 NJX AA&/STRD BRCH PLXS IMG: CPT | Performed by: ANESTHESIOLOGY

## 2024-01-04 PROCEDURE — 6360000002 HC RX W HCPCS: Performed by: NURSE ANESTHETIST, CERTIFIED REGISTERED

## 2024-01-04 PROCEDURE — C1776 JOINT DEVICE (IMPLANTABLE): HCPCS | Performed by: ORTHOPAEDIC SURGERY

## 2024-01-04 PROCEDURE — 3700000000 HC ANESTHESIA ATTENDED CARE: Performed by: ORTHOPAEDIC SURGERY

## 2024-01-04 PROCEDURE — C1713 ANCHOR/SCREW BN/BN,TIS/BN: HCPCS | Performed by: ORTHOPAEDIC SURGERY

## 2024-01-04 PROCEDURE — 2580000003 HC RX 258: Performed by: ORTHOPAEDIC SURGERY

## 2024-01-04 PROCEDURE — 7100000000 HC PACU RECOVERY - FIRST 15 MIN: Performed by: ORTHOPAEDIC SURGERY

## 2024-01-04 PROCEDURE — 2500000003 HC RX 250 WO HCPCS: Performed by: NURSE ANESTHETIST, CERTIFIED REGISTERED

## 2024-01-04 PROCEDURE — 3700000001 HC ADD 15 MINUTES (ANESTHESIA): Performed by: ORTHOPAEDIC SURGERY

## 2024-01-04 PROCEDURE — 3600000015 HC SURGERY LEVEL 5 ADDTL 15MIN: Performed by: ORTHOPAEDIC SURGERY

## 2024-01-04 PROCEDURE — 3600000005 HC SURGERY LEVEL 5 BASE: Performed by: ORTHOPAEDIC SURGERY

## 2024-01-04 PROCEDURE — 7100000001 HC PACU RECOVERY - ADDTL 15 MIN: Performed by: ORTHOPAEDIC SURGERY

## 2024-01-04 PROCEDURE — 6370000000 HC RX 637 (ALT 250 FOR IP): Performed by: ORTHOPAEDIC SURGERY

## 2024-01-04 PROCEDURE — 2580000003 HC RX 258: Performed by: ANESTHESIOLOGY

## 2024-01-04 PROCEDURE — 2720000010 HC SURG SUPPLY STERILE: Performed by: ORTHOPAEDIC SURGERY

## 2024-01-04 PROCEDURE — 2709999900 HC NON-CHARGEABLE SUPPLY: Performed by: ORTHOPAEDIC SURGERY

## 2024-01-04 PROCEDURE — 6360000002 HC RX W HCPCS: Performed by: ORTHOPAEDIC SURGERY

## 2024-01-04 PROCEDURE — 94760 N-INVAS EAR/PLS OXIMETRY 1: CPT

## 2024-01-04 PROCEDURE — 2700000000 HC OXYGEN THERAPY PER DAY

## 2024-01-04 DEVICE — IMPLANTABLE DEVICE
Type: IMPLANTABLE DEVICE | Site: SHOULDER | Status: FUNCTIONAL
Brand: EQUINOXE

## 2024-01-04 DEVICE — COMPONENT TOT SHLDR CAPPED S3EXACTECH] EXACTECH INC]: Type: IMPLANTABLE DEVICE | Status: FUNCTIONAL

## 2024-01-04 RX ORDER — SODIUM CHLORIDE 0.9 % (FLUSH) 0.9 %
5-40 SYRINGE (ML) INJECTION PRN
Status: DISCONTINUED | OUTPATIENT
Start: 2024-01-04 | End: 2024-01-05 | Stop reason: HOSPADM

## 2024-01-04 RX ORDER — LOSARTAN POTASSIUM 50 MG/1
50 TABLET ORAL DAILY
Status: DISCONTINUED | OUTPATIENT
Start: 2024-01-04 | End: 2024-01-05 | Stop reason: HOSPADM

## 2024-01-04 RX ORDER — HYDRALAZINE HYDROCHLORIDE 20 MG/ML
10 INJECTION INTRAMUSCULAR; INTRAVENOUS
Status: DISCONTINUED | OUTPATIENT
Start: 2024-01-04 | End: 2024-01-04 | Stop reason: HOSPADM

## 2024-01-04 RX ORDER — ONDANSETRON 4 MG/1
4 TABLET, ORALLY DISINTEGRATING ORAL EVERY 8 HOURS PRN
Status: DISCONTINUED | OUTPATIENT
Start: 2024-01-04 | End: 2024-01-05 | Stop reason: HOSPADM

## 2024-01-04 RX ORDER — CETIRIZINE HYDROCHLORIDE 10 MG/1
10 TABLET ORAL DAILY PRN
Status: DISCONTINUED | OUTPATIENT
Start: 2024-01-04 | End: 2024-01-05 | Stop reason: HOSPADM

## 2024-01-04 RX ORDER — GINSENG 100 MG
CAPSULE ORAL 3 TIMES DAILY
Status: DISCONTINUED | OUTPATIENT
Start: 2024-01-04 | End: 2024-01-04

## 2024-01-04 RX ORDER — ONDANSETRON 2 MG/ML
INJECTION INTRAMUSCULAR; INTRAVENOUS PRN
Status: DISCONTINUED | OUTPATIENT
Start: 2024-01-04 | End: 2024-01-04 | Stop reason: SDUPTHER

## 2024-01-04 RX ORDER — SENNOSIDES A AND B 8.6 MG/1
1 TABLET, FILM COATED ORAL DAILY PRN
Status: DISCONTINUED | OUTPATIENT
Start: 2024-01-04 | End: 2024-01-05 | Stop reason: HOSPADM

## 2024-01-04 RX ORDER — SODIUM CHLORIDE 9 MG/ML
INJECTION, SOLUTION INTRAVENOUS CONTINUOUS
Status: DISCONTINUED | OUTPATIENT
Start: 2024-01-04 | End: 2024-01-05 | Stop reason: HOSPADM

## 2024-01-04 RX ORDER — ONDANSETRON 2 MG/ML
4 INJECTION INTRAMUSCULAR; INTRAVENOUS EVERY 6 HOURS PRN
Status: DISCONTINUED | OUTPATIENT
Start: 2024-01-04 | End: 2024-01-05 | Stop reason: HOSPADM

## 2024-01-04 RX ORDER — SODIUM CHLORIDE 9 MG/ML
INJECTION, SOLUTION INTRAVENOUS PRN
Status: DISCONTINUED | OUTPATIENT
Start: 2024-01-04 | End: 2024-01-04 | Stop reason: HOSPADM

## 2024-01-04 RX ORDER — SODIUM CHLORIDE 0.9 % (FLUSH) 0.9 %
5-40 SYRINGE (ML) INJECTION EVERY 12 HOURS SCHEDULED
Status: DISCONTINUED | OUTPATIENT
Start: 2024-01-04 | End: 2024-01-04 | Stop reason: HOSPADM

## 2024-01-04 RX ORDER — OXYCODONE HYDROCHLORIDE 5 MG/1
10 TABLET ORAL EVERY 4 HOURS PRN
Status: DISCONTINUED | OUTPATIENT
Start: 2024-01-04 | End: 2024-01-05 | Stop reason: HOSPADM

## 2024-01-04 RX ORDER — BISACODYL 5 MG/1
5 TABLET, DELAYED RELEASE ORAL DAILY PRN
Status: DISCONTINUED | OUTPATIENT
Start: 2024-01-04 | End: 2024-01-05 | Stop reason: HOSPADM

## 2024-01-04 RX ORDER — MIDAZOLAM HYDROCHLORIDE 2 MG/2ML
2 INJECTION, SOLUTION INTRAMUSCULAR; INTRAVENOUS
Status: COMPLETED | OUTPATIENT
Start: 2024-01-04 | End: 2024-01-04

## 2024-01-04 RX ORDER — ONDANSETRON 2 MG/ML
4 INJECTION INTRAMUSCULAR; INTRAVENOUS ONCE
Status: DISCONTINUED | OUTPATIENT
Start: 2024-01-04 | End: 2024-01-04 | Stop reason: HOSPADM

## 2024-01-04 RX ORDER — SODIUM CHLORIDE 0.9 % (FLUSH) 0.9 %
5-40 SYRINGE (ML) INJECTION PRN
Status: DISCONTINUED | OUTPATIENT
Start: 2024-01-04 | End: 2024-01-04 | Stop reason: HOSPADM

## 2024-01-04 RX ORDER — HYDROXYZINE HYDROCHLORIDE 10 MG/1
10 TABLET, FILM COATED ORAL EVERY 8 HOURS PRN
Status: DISCONTINUED | OUTPATIENT
Start: 2024-01-04 | End: 2024-01-05 | Stop reason: HOSPADM

## 2024-01-04 RX ORDER — MIDAZOLAM HYDROCHLORIDE 2 MG/2ML
2 INJECTION, SOLUTION INTRAMUSCULAR; INTRAVENOUS
Status: DISCONTINUED | OUTPATIENT
Start: 2024-01-04 | End: 2024-01-04 | Stop reason: HOSPADM

## 2024-01-04 RX ORDER — ROPIVACAINE HYDROCHLORIDE 5 MG/ML
INJECTION, SOLUTION EPIDURAL; INFILTRATION; PERINEURAL
Status: COMPLETED | OUTPATIENT
Start: 2024-01-04 | End: 2024-01-04

## 2024-01-04 RX ORDER — OXYCODONE HYDROCHLORIDE 5 MG/1
5 TABLET ORAL EVERY 4 HOURS PRN
Status: DISCONTINUED | OUTPATIENT
Start: 2024-01-04 | End: 2024-01-05 | Stop reason: HOSPADM

## 2024-01-04 RX ORDER — NEOSTIGMINE METHYLSULFATE 1 MG/ML
INJECTION, SOLUTION INTRAVENOUS PRN
Status: DISCONTINUED | OUTPATIENT
Start: 2024-01-04 | End: 2024-01-04 | Stop reason: SDUPTHER

## 2024-01-04 RX ORDER — HYDROMORPHONE HYDROCHLORIDE 1 MG/ML
0.5 INJECTION, SOLUTION INTRAMUSCULAR; INTRAVENOUS; SUBCUTANEOUS
Status: DISCONTINUED | OUTPATIENT
Start: 2024-01-04 | End: 2024-01-05 | Stop reason: HOSPADM

## 2024-01-04 RX ORDER — HYDROMORPHONE HYDROCHLORIDE 1 MG/ML
INJECTION, SOLUTION INTRAMUSCULAR; INTRAVENOUS; SUBCUTANEOUS PRN
Status: DISCONTINUED | OUTPATIENT
Start: 2024-01-04 | End: 2024-01-04 | Stop reason: SDUPTHER

## 2024-01-04 RX ORDER — GLYCOPYRROLATE 0.2 MG/ML
INJECTION, SOLUTION INTRAMUSCULAR; INTRAVENOUS PRN
Status: DISCONTINUED | OUTPATIENT
Start: 2024-01-04 | End: 2024-01-04 | Stop reason: SDUPTHER

## 2024-01-04 RX ORDER — M-VIT,TX,IRON,MINS/CALC/FOLIC 27MG-0.4MG
1 TABLET ORAL DAILY
Status: DISCONTINUED | OUTPATIENT
Start: 2024-01-04 | End: 2024-01-05 | Stop reason: HOSPADM

## 2024-01-04 RX ORDER — SODIUM CHLORIDE, SODIUM LACTATE, POTASSIUM CHLORIDE, CALCIUM CHLORIDE 600; 310; 30; 20 MG/100ML; MG/100ML; MG/100ML; MG/100ML
INJECTION, SOLUTION INTRAVENOUS CONTINUOUS
Status: DISCONTINUED | OUTPATIENT
Start: 2024-01-04 | End: 2024-01-05 | Stop reason: HOSPADM

## 2024-01-04 RX ORDER — PROCHLORPERAZINE EDISYLATE 5 MG/ML
5 INJECTION INTRAMUSCULAR; INTRAVENOUS
Status: DISCONTINUED | OUTPATIENT
Start: 2024-01-04 | End: 2024-01-04 | Stop reason: HOSPADM

## 2024-01-04 RX ORDER — FAMOTIDINE 20 MG/1
20 TABLET, FILM COATED ORAL 2 TIMES DAILY
Status: DISCONTINUED | OUTPATIENT
Start: 2024-01-04 | End: 2024-01-05 | Stop reason: HOSPADM

## 2024-01-04 RX ORDER — DIPHENHYDRAMINE HYDROCHLORIDE 50 MG/ML
12.5 INJECTION INTRAMUSCULAR; INTRAVENOUS
Status: DISCONTINUED | OUTPATIENT
Start: 2024-01-04 | End: 2024-01-04 | Stop reason: HOSPADM

## 2024-01-04 RX ORDER — SUCCINYLCHOLINE/SOD CL,ISO/PF 200MG/10ML
SYRINGE (ML) INTRAVENOUS PRN
Status: DISCONTINUED | OUTPATIENT
Start: 2024-01-04 | End: 2024-01-04 | Stop reason: SDUPTHER

## 2024-01-04 RX ORDER — DEXAMETHASONE SODIUM PHOSPHATE 4 MG/ML
INJECTION, SOLUTION INTRA-ARTICULAR; INTRALESIONAL; INTRAMUSCULAR; INTRAVENOUS; SOFT TISSUE PRN
Status: DISCONTINUED | OUTPATIENT
Start: 2024-01-04 | End: 2024-01-04 | Stop reason: SDUPTHER

## 2024-01-04 RX ORDER — OXYCODONE HYDROCHLORIDE 5 MG/1
5-10 TABLET ORAL EVERY 4 HOURS PRN
Qty: 40 TABLET | Refills: 0 | Status: SHIPPED | OUTPATIENT
Start: 2024-01-04 | End: 2024-01-09

## 2024-01-04 RX ORDER — ESMOLOL HYDROCHLORIDE 10 MG/ML
INJECTION INTRAVENOUS PRN
Status: DISCONTINUED | OUTPATIENT
Start: 2024-01-04 | End: 2024-01-04 | Stop reason: SDUPTHER

## 2024-01-04 RX ORDER — SODIUM CHLORIDE 0.9 % (FLUSH) 0.9 %
5-40 SYRINGE (ML) INJECTION EVERY 12 HOURS SCHEDULED
Status: DISCONTINUED | OUTPATIENT
Start: 2024-01-04 | End: 2024-01-05 | Stop reason: HOSPADM

## 2024-01-04 RX ORDER — HYDROCHLOROTHIAZIDE 25 MG/1
25 TABLET ORAL EVERY OTHER DAY
Status: DISCONTINUED | OUTPATIENT
Start: 2024-01-04 | End: 2024-01-05 | Stop reason: HOSPADM

## 2024-01-04 RX ORDER — SODIUM CHLORIDE 9 MG/ML
INJECTION, SOLUTION INTRAVENOUS PRN
Status: DISCONTINUED | OUTPATIENT
Start: 2024-01-04 | End: 2024-01-05 | Stop reason: HOSPADM

## 2024-01-04 RX ORDER — GENTAMICIN SULFATE 40 MG/ML
INJECTION, SOLUTION INTRAMUSCULAR; INTRAVENOUS PRN
Status: DISCONTINUED | OUTPATIENT
Start: 2024-01-04 | End: 2024-01-04 | Stop reason: HOSPADM

## 2024-01-04 RX ORDER — SODIUM CHLORIDE, SODIUM LACTATE, POTASSIUM CHLORIDE, CALCIUM CHLORIDE 600; 310; 30; 20 MG/100ML; MG/100ML; MG/100ML; MG/100ML
INJECTION, SOLUTION INTRAVENOUS CONTINUOUS
Status: DISCONTINUED | OUTPATIENT
Start: 2024-01-04 | End: 2024-01-04 | Stop reason: HOSPADM

## 2024-01-04 RX ORDER — FENTANYL CITRATE 50 UG/ML
50 INJECTION, SOLUTION INTRAMUSCULAR; INTRAVENOUS EVERY 5 MIN PRN
Status: DISCONTINUED | OUTPATIENT
Start: 2024-01-04 | End: 2024-01-04 | Stop reason: HOSPADM

## 2024-01-04 RX ORDER — KETOROLAC TROMETHAMINE 30 MG/ML
15 INJECTION, SOLUTION INTRAMUSCULAR; INTRAVENOUS EVERY 6 HOURS
Status: DISCONTINUED | OUTPATIENT
Start: 2024-01-04 | End: 2024-01-05 | Stop reason: HOSPADM

## 2024-01-04 RX ORDER — ROCURONIUM BROMIDE 10 MG/ML
INJECTION, SOLUTION INTRAVENOUS PRN
Status: DISCONTINUED | OUTPATIENT
Start: 2024-01-04 | End: 2024-01-04 | Stop reason: SDUPTHER

## 2024-01-04 RX ORDER — ONDANSETRON 2 MG/ML
4 INJECTION INTRAMUSCULAR; INTRAVENOUS
Status: DISCONTINUED | OUTPATIENT
Start: 2024-01-04 | End: 2024-01-04 | Stop reason: HOSPADM

## 2024-01-04 RX ORDER — LIDOCAINE HYDROCHLORIDE 20 MG/ML
INJECTION, SOLUTION EPIDURAL; INFILTRATION; INTRACAUDAL; PERINEURAL PRN
Status: DISCONTINUED | OUTPATIENT
Start: 2024-01-04 | End: 2024-01-04 | Stop reason: SDUPTHER

## 2024-01-04 RX ORDER — DEXAMETHASONE SODIUM PHOSPHATE 10 MG/ML
8 INJECTION, SOLUTION INTRAMUSCULAR; INTRAVENOUS ONCE
Status: DISCONTINUED | OUTPATIENT
Start: 2024-01-04 | End: 2024-01-04 | Stop reason: HOSPADM

## 2024-01-04 RX ORDER — ACETAMINOPHEN 500 MG
1000 TABLET ORAL ONCE
Status: DISCONTINUED | OUTPATIENT
Start: 2024-01-04 | End: 2024-01-04 | Stop reason: HOSPADM

## 2024-01-04 RX ORDER — SODIUM CHLORIDE 9 MG/ML
INJECTION, SOLUTION INTRAVENOUS CONTINUOUS
Status: DISCONTINUED | OUTPATIENT
Start: 2024-01-04 | End: 2024-01-04 | Stop reason: HOSPADM

## 2024-01-04 RX ORDER — AMIODARONE HYDROCHLORIDE 200 MG/1
50 TABLET ORAL DAILY
Status: DISCONTINUED | OUTPATIENT
Start: 2024-01-04 | End: 2024-01-05 | Stop reason: HOSPADM

## 2024-01-04 RX ORDER — ACETAMINOPHEN 325 MG/1
650 TABLET ORAL EVERY 6 HOURS
Status: DISCONTINUED | OUTPATIENT
Start: 2024-01-04 | End: 2024-01-05 | Stop reason: HOSPADM

## 2024-01-04 RX ORDER — HYDROMORPHONE HYDROCHLORIDE 1 MG/ML
0.25 INJECTION, SOLUTION INTRAMUSCULAR; INTRAVENOUS; SUBCUTANEOUS EVERY 5 MIN PRN
Status: DISCONTINUED | OUTPATIENT
Start: 2024-01-04 | End: 2024-01-04 | Stop reason: HOSPADM

## 2024-01-04 RX ORDER — FENTANYL CITRATE 50 UG/ML
100 INJECTION, SOLUTION INTRAMUSCULAR; INTRAVENOUS
Status: COMPLETED | OUTPATIENT
Start: 2024-01-04 | End: 2024-01-04

## 2024-01-04 RX ORDER — PROPOFOL 10 MG/ML
INJECTION, EMULSION INTRAVENOUS PRN
Status: DISCONTINUED | OUTPATIENT
Start: 2024-01-04 | End: 2024-01-04 | Stop reason: SDUPTHER

## 2024-01-04 RX ORDER — GINSENG 100 MG
CAPSULE ORAL PRN
Status: DISCONTINUED | OUTPATIENT
Start: 2024-01-04 | End: 2024-01-04 | Stop reason: HOSPADM

## 2024-01-04 RX ORDER — MEPERIDINE HYDROCHLORIDE 25 MG/ML
12.5 INJECTION INTRAMUSCULAR; INTRAVENOUS; SUBCUTANEOUS EVERY 5 MIN PRN
Status: DISCONTINUED | OUTPATIENT
Start: 2024-01-04 | End: 2024-01-04 | Stop reason: HOSPADM

## 2024-01-04 RX ADMIN — SODIUM CHLORIDE: 9 INJECTION, SOLUTION INTRAVENOUS at 11:32

## 2024-01-04 RX ADMIN — NEOSTIGMINE METHYLSULFATE 3 MG: 1 INJECTION, SOLUTION INTRAVENOUS at 10:18

## 2024-01-04 RX ADMIN — FAMOTIDINE 20 MG: 20 TABLET, FILM COATED ORAL at 15:50

## 2024-01-04 RX ADMIN — SODIUM CHLORIDE: 9 INJECTION, SOLUTION INTRAVENOUS at 11:18

## 2024-01-04 RX ADMIN — FENTANYL CITRATE 50 MCG: 50 INJECTION INTRAMUSCULAR; INTRAVENOUS at 07:23

## 2024-01-04 RX ADMIN — PROPOFOL 50 MG: 10 INJECTION, EMULSION INTRAVENOUS at 08:09

## 2024-01-04 RX ADMIN — ROPIVACAINE HYDROCHLORIDE 30 ML: 5 INJECTION, SOLUTION EPIDURAL; INFILTRATION; PERINEURAL at 07:26

## 2024-01-04 RX ADMIN — KETOROLAC TROMETHAMINE 15 MG: 30 INJECTION INTRAMUSCULAR; INTRAVENOUS at 15:17

## 2024-01-04 RX ADMIN — Medication 120 MG: at 08:09

## 2024-01-04 RX ADMIN — SODIUM CHLORIDE: 9 INJECTION, SOLUTION INTRAVENOUS at 22:38

## 2024-01-04 RX ADMIN — HYDROCHLOROTHIAZIDE 25 MG: 25 TABLET ORAL at 15:50

## 2024-01-04 RX ADMIN — RIVAROXABAN 20 MG: 20 TABLET, FILM COATED ORAL at 17:42

## 2024-01-04 RX ADMIN — MIDAZOLAM HYDROCHLORIDE 2 MG: 1 INJECTION, SOLUTION INTRAMUSCULAR; INTRAVENOUS at 07:23

## 2024-01-04 RX ADMIN — WATER 2000 MG: 1 INJECTION INTRAMUSCULAR; INTRAVENOUS; SUBCUTANEOUS at 15:16

## 2024-01-04 RX ADMIN — PHENYLEPHRINE HYDROCHLORIDE 20 MCG/MIN: 10 INJECTION INTRAVENOUS at 08:37

## 2024-01-04 RX ADMIN — GLYCOPYRROLATE 0.6 MG: 0.2 INJECTION, SOLUTION INTRAMUSCULAR; INTRAVENOUS at 10:18

## 2024-01-04 RX ADMIN — FAMOTIDINE 20 MG: 20 TABLET, FILM COATED ORAL at 21:11

## 2024-01-04 RX ADMIN — SODIUM CHLORIDE, POTASSIUM CHLORIDE, SODIUM LACTATE AND CALCIUM CHLORIDE: 600; 310; 30; 20 INJECTION, SOLUTION INTRAVENOUS at 06:33

## 2024-01-04 RX ADMIN — LIDOCAINE HYDROCHLORIDE 80 MG: 20 INJECTION, SOLUTION EPIDURAL; INFILTRATION; INTRACAUDAL; PERINEURAL at 08:08

## 2024-01-04 RX ADMIN — HYDROMORPHONE HYDROCHLORIDE 0.5 MG: 1 INJECTION, SOLUTION INTRAMUSCULAR; INTRAVENOUS; SUBCUTANEOUS at 11:03

## 2024-01-04 RX ADMIN — WATER 2000 MG: 1 INJECTION INTRAMUSCULAR; INTRAVENOUS; SUBCUTANEOUS at 08:17

## 2024-01-04 RX ADMIN — ROCURONIUM BROMIDE 10 MG: 10 INJECTION, SOLUTION INTRAVENOUS at 09:31

## 2024-01-04 RX ADMIN — SODIUM CHLORIDE, PRESERVATIVE FREE 10 ML: 5 INJECTION INTRAVENOUS at 21:11

## 2024-01-04 RX ADMIN — HYDROMORPHONE HYDROCHLORIDE 0.5 MG: 1 INJECTION, SOLUTION INTRAMUSCULAR; INTRAVENOUS; SUBCUTANEOUS at 11:10

## 2024-01-04 RX ADMIN — ACETAMINOPHEN 650 MG: 325 TABLET ORAL at 15:16

## 2024-01-04 RX ADMIN — ROCURONIUM BROMIDE 45 MG: 10 INJECTION, SOLUTION INTRAVENOUS at 08:20

## 2024-01-04 RX ADMIN — PROPOFOL 150 MG: 10 INJECTION, EMULSION INTRAVENOUS at 08:08

## 2024-01-04 RX ADMIN — AMIODARONE HYDROCHLORIDE 50 MG: 200 TABLET ORAL at 15:56

## 2024-01-04 RX ADMIN — DEXAMETHASONE SODIUM PHOSPHATE 4 MG: 4 INJECTION INTRA-ARTICULAR; INTRALESIONAL; INTRAMUSCULAR; INTRAVENOUS; SOFT TISSUE at 08:11

## 2024-01-04 RX ADMIN — MULTIPLE VITAMINS W/ MINERALS TAB 1 TABLET: TAB at 15:16

## 2024-01-04 RX ADMIN — ESMOLOL HYDROCHLORIDE 30 MG: 10 INJECTION, SOLUTION INTRAVENOUS at 11:00

## 2024-01-04 RX ADMIN — LOSARTAN POTASSIUM 50 MG: 50 TABLET, FILM COATED ORAL at 15:16

## 2024-01-04 RX ADMIN — ROCURONIUM BROMIDE 5 MG: 10 INJECTION, SOLUTION INTRAVENOUS at 08:08

## 2024-01-04 RX ADMIN — KETOROLAC TROMETHAMINE 15 MG: 30 INJECTION INTRAMUSCULAR; INTRAVENOUS at 18:15

## 2024-01-04 RX ADMIN — ONDANSETRON 4 MG: 2 INJECTION INTRAMUSCULAR; INTRAVENOUS at 08:11

## 2024-01-04 ASSESSMENT — PAIN - FUNCTIONAL ASSESSMENT: PAIN_FUNCTIONAL_ASSESSMENT: NONE - DENIES PAIN

## 2024-01-04 ASSESSMENT — PAIN SCALES - GENERAL
PAINLEVEL_OUTOF10: 0

## 2024-01-04 NOTE — DISCHARGE INSTRUCTIONS
Postoperative Instructions    Medications/Diet    Eat only light, non-greasy foods today  Take pain medicine with food  While taking pain medicines, you may not operate a vehicle, heavy machinery, or appliances  While taking pain medicines, you may not drink alcoholic beverages  While taking pain medicines, you may not make critical decisions or sign legal papers  If you have any reactions to your medicines, stop taking them and call my office immediately  If you are not allergic, take one 325mg aspirin twice a day to help prevent blood clots  Please keep in mind that constipation is a very common side effect of taking narcotic pain medication. We recommend that patients take precautions to prevent constipation:  Drink plenty of water (6-8 glasses of 8 oz. a day)  Avoid alcohol, caffeine, and dairy products  Eat plenty of fiber (fruits, vegetables and whole grains)  Take an over the counter stool softener (colace or dulcolax)          Activity/Exercise    You may move the arm as directed by physical therapist  You may take arm out of sling and move elbow as necessary.  Must wear sling while out of the house and while sleeping  You may change dressing daily.  If no drainage, you may leave dressing off.  You may shower on post operative day #7  Please keep ice applied to the shoulder for the first 72 hours or as long as pain or swelling persist. Do not apply ice directly to skin, or allow water to leak on your dressing    Emergency/Follow-Up    Please notify my office at [phone] if you develop any fever (101° or above), unexpected warmth, redness or swelling in your shoulder  Please call if your fingers/toes become cold, purple, numb, or there is excessive bleeding  Please call the office within 24 hours at [phone] ([phone]) to schedule a follow up appointment next week  Please call the office before 3pm on Friday if you do not have enough pain medicine for the weekend. Narcotic pain medication cannot be called

## 2024-01-04 NOTE — PROGRESS NOTES
TRANSFER - OUT REPORT:    Verbal report given to CORNELIO Colin(name) on Ander Yepez  being transferred to (unit) for routine transfer of care.       Report consisted of patient’s Situation, Background, Assessment and   Recommendations(SBAR).     Time Pre op antibiotic given:0817  Anesthesia Stop time: 1117    Information from the following report(s) SBAR, procedure, medications given was reviewed with the receiving nurse.    Opportunity for questions and clarification was provided.     Is the patient on 02? no       L/Min        Other     Is the patient on a monitor? no    Is the nurse transporting with the patient? no    Surgical Waiting Area notified of patient's transfer from PACU? yes

## 2024-01-04 NOTE — FLOWSHEET NOTE
01/04/24 1041   Family Communication    Relationship to Patient Other (Comment)  (LISTED , NAT)    Phone Number [phone]   Family/Significant Other Update Called   Delivery Origin Nurse   Message Disposition Family present - message delivered   Update Given Yes   Family Communication   Family Update Message Surgeon working

## 2024-01-04 NOTE — ANESTHESIA POSTPROCEDURE EVALUATION
Department of Anesthesiology  Postprocedure Note    Patient: Ander Yepez  MRN: [id number]  YOB: 1950  Date of evaluation: 1/4/2024    Procedure Summary       Date: 01/04/24 Room / Location: [facility] MAIN OR [address] [place] [address] MAIN OR    Anesthesia Start: 0800 Anesthesia Stop: 1118    Procedure: RIGHT REVERSE TOTAL SHOULDER ARTHROPLASTY (GEN/BLOCK) (Right: Shoulder) Diagnosis:       Glenohumeral arthritis, right      (Glenohumeral arthritis, right [M19.011])    Providers: Fe West MD Responsible Provider: Josiah Wilson DO    Anesthesia Type: General, Regional ASA Status: 3            Anesthesia Type: General, Regional    Herman Phase I: Herman Score: 7    Herman Phase II:      Anesthesia Post Evaluation    Patient location during evaluation: PACU  Patient participation: complete - patient participated  Level of consciousness: awake and alert  Pain score: 0  Airway patency: patent  Nausea & Vomiting: no nausea  Cardiovascular status: hemodynamically stable  Respiratory status: acceptable  Hydration status: euvolemic  Comments: Seen, no complaints   Pain management: adequate    There were no known notable events for this encounter.

## 2024-01-04 NOTE — BRIEF OP NOTE
[id number]  STEM HUM [id number] SHLDR SIENNA PRESSFIT EQUINOXE [id number] [employer] N/A Right 1 Implanted   TRAY HUM ADPT +0MM OFFSET STD POLY FOR RVS SHLDR SYS - [id number]  TRAY HUM ADPT +0MM OFFSET STD POLY FOR RVS SHLDR SYS [id number] [employer] N/A Right 1 Implanted   LINER HUM [id number] +2.5MM OFFSET STD POLY FOR RVS SHLDR SYS - [id number]  LINER HUM [id number] +2.5MM OFFSET STD POLY FOR RVS SHLDR SYS [id number] [employer] N/A Right 1 Implanted         Findings: see notes      Electronically signed by Fe West MD on 1/4/2024 at 11:09 AM

## 2024-01-04 NOTE — FLOWSHEET NOTE
01/04/24 0843   Family Communication    Relationship to Patient Other (Comment)  (LISTED , NAT)    Phone Number [phone]   Family/Significant Other Update Called   Delivery Origin Nurse   Message Disposition Family present - message delivered   Update Given Yes   Family Communication   Family Update Message Procedure started

## 2024-01-04 NOTE — ANESTHESIA PROCEDURE NOTES
Peripheral Block    Patient location during procedure: pre-op  Reason for block: post-op pain management and at surgeon's request  Start time: 1/4/2024 7:26 AM  Staffing  Performed: anesthesiologist   Performed by: Josiah Wilson DO  Authorized by: Josiah Wilson DO    Preanesthetic Checklist  Completed: patient identified, IV checked, site marked, risks and benefits discussed, surgical/procedural consents, equipment checked, pre-op evaluation, timeout performed, anesthesia consent given, oxygen available, monitors applied/VS acknowledged and blood product R/B/A discussed and consented  Peripheral Block   Patient position: prone  Prep: ChloraPrep  Provider prep: mask and sterile gloves  Patient monitoring: cardiac monitor, continuous pulse ox, continuous capnometry, frequent blood pressure checks, IV access, oxygen and responsive to questions  Block type: Brachial plexus  Supraclavicular  Laterality: right  Injection technique: single-shot  Guidance: ultrasound guided    Needle   Needle type: pencil-tip   Needle gauge: 21 G  Needle localization: anatomical landmarks and ultrasound guidance  Needle length: 10 cm  Assessment   Injection assessment: negative aspiration for heme, no paresthesia on injection, local visualized surrounding nerve on ultrasound and no intravascular symptoms  Paresthesia pain: none  Slow fractionated injection: no  Hemodynamics: stable  Real-time US image taken/store: yes  Outcomes: uncomplicated    Medications Administered  ropivacaine (NAROPIN) injection 0.5% - Perineural   30 mL - 1/4/2024 7:26:00 AM

## 2024-01-04 NOTE — OP NOTE
on his preoperative x-ray, he did have loose bodies that were identified in the sheath.  These were what appeared to be two.  We were able to milk these up and out.  The long head of the biceps was actually not identified.  I suspect he had biceps pathology from years prior.    The subscap was in good condition.  We released the clavipectoral fascia and released a small portion of the pec for exposure purposes.  The subscap was whipstitched with #1 Ethibond and 2-0 Vicryl was used to close the circumflex vasculature.  A tenotomy was made in standard fashion.  Inferior dissection was performed as the shoulder was externally rotated.  The capsule was released.  A Mendoza retractor was deployed in the joint.  The shoulder was dislocated into the joint.  Full evaluation of the humeral head was identified.  The neck shaft angle was created with a guide.  Bone saw was used to cut the bone.  The IM canal was entered with a reamer and dilated up to a size 15 which is what we preoperatively estimated.  A calcar reamer was then used to flush cut the proximal humerus.  The manhole cover was inserted.  The shoulder was then posteriorly dislocated and held in place with Darrach retractor.  The subscap was released from its anterior structures as well as releasing the anterior and inferior labrum.  Full vision of the glenoid was identified.  The outrigger for navigation was attached to the coracoid.  We had chosen a 10-degree superior standard augment in this scenario.    We then mapped all the bony landmarks, computer went through its acquisition.  The 3.2 drill bit was used to create a  hole.  We then reamed up for a size 42.  Happy with this, the center ream for the glenoid was identified and the computer was registered.  The center hole was irrigated with Irrisept, followed by pulse irrigation.  We then packed bone in the metaglene, and the metaglene was inserted.  We then placed three 4.5 self-tapping screws, one

## 2024-01-04 NOTE — H&P
normal; teeth and gums normal   Neck:   Supple, symmetrical, trachea midline, no adenopathy;        thyroid:  No enlargement/tenderness/nodules; no carotid    bruit or JVD   Back:     Symmetric, no curvature, ROM normal, no CVA tenderness   Lungs:     Clear to auscultation bilaterally, respirations unlabored   Chest wall:    No tenderness or deformity   Heart:    Regular rate and rhythm, S1 and S2 normal, no murmur, rub   or gallop   Abdomen:     Soft, non-tender, bowel sounds active all four quadrants,     no masses, no organomegaly   Extremities:   Right shoulder: pain with limited ROM. NVI   Pulses:   2+ and symmetric all extremities   Skin:   Skin color, texture, turgor normal, no rashes or lesions   Lymph nodes:   Cervical, supraclavicular, and axillary nodes normal   Neurologic:   CNII-XII intact. Normal strength, sensation and reflexes       throughout           Assessment:     Active Problems:    * No active hospital problems. *  Resolved Problems:    * No resolved hospital problems. *      Plan:     The various methods of treatment have been discussed with the patient and family.   After consideration of risks, benefits and other options for treatment, the patient has consented to surgical intervention.Risks of infection, DVT, PE, MI, CVA and unforeseen events described to the patient.  Additionally discussed the possibility of not being able to resolve all preop pain.  Patient understands metal and plastic will be implanted in the body and understands the potential for revision surgery.  Patient wishes to proceed with elective surgery.

## 2024-01-04 NOTE — PROGRESS NOTES
Occupational Therapy   01/04/24    Orders received, chart review completed. Note patient POD #0 s/p R reverse TSA, not a fast track. OT will follow up tomorrow for evaluation. Recommend OOB to chair three times a day for meals, self-completion of ADLs as able and medically stable.     Thank you,   Jaleesa Rader, OTD, OTR/L

## 2024-01-04 NOTE — ANESTHESIA PRE PROCEDURE
CARTILAGE SCRAPED PER PT   • MS ANESTH,KNEE AREA SURGERY   1966   • TOTAL HIP ARTHROPLASTY Right 2004 and 2006?    Right x2   • TOTAL HIP ARTHROPLASTY Left 01/1999   • WISDOM TOOTH EXTRACTION         Social History:    Social History     Tobacco Use   • Smoking status: Never     Passive exposure: Never   • Smokeless tobacco: Former     Types: Chew     Quit date: 1990   Substance Use Topics   • Alcohol use: Yes     Alcohol/week: 2.0 standard drinks of alcohol     Types: 2 Drinks containing 0.5 oz of alcohol per week     Comment: Not every week                                Counseling given: Not Answered      Vital Signs (Current):   Vitals:    01/04/24 0614 01/04/24 0656 01/04/24 0730 01/04/24 0738   BP: (!) 154/81 130/77 116/73 131/70   Pulse: 54 53 51 50   Resp: 16 16 18 13   Temp: 98.3 °F (36.8 °C)      TempSrc: Oral      SpO2: 97% 99% 100% 100%   Weight: 79.4 kg (175 lb)      Height: 1.676 m (5' 6\")                                                 BP Readings from Last 3 Encounters:   01/04/24 131/70   12/27/23 137/83   12/28/23 124/86       NPO Status:                                                   Date of last liquid consumption: 01/03/24                        Date of last solid food consumption: 01/03/24    BMI:   Wt Readings from Last 3 Encounters:   01/04/24 79.4 kg (175 lb)   12/27/23 81.9 kg (180 lb 8.9 oz)   12/28/23 82.4 kg (181 lb 9.6 oz)     Body mass index is 28.25 kg/m².    CBC:   Lab Results   Component Value Date/Time    WBC 7.3 12/27/2023 09:49 AM    RBC 5.07 12/27/2023 09:49 AM    HGB 14.9 12/27/2023 09:49 AM    HCT 44.4 12/27/2023 09:49 AM    MCV 87.6 12/27/2023 09:49 AM    RDW 14.5 12/27/2023 09:49 AM     12/27/2023 09:49 AM       CMP:   Lab Results   Component Value Date/Time     12/27/2023 09:49 AM    K 4.4 12/27/2023 09:49 AM     12/27/2023 09:49 AM    CO2 31 12/27/2023 09:49 AM    BUN 12 12/27/2023 09:49 AM    CREATININE 0.87 12/27/2023 09:49 AM    GFRAA >60

## 2024-01-05 VITALS
RESPIRATION RATE: 15 BRPM | TEMPERATURE: 98 F | SYSTOLIC BLOOD PRESSURE: 152 MMHG | HEART RATE: 58 BPM | HEIGHT: 66 IN | BODY MASS INDEX: 28.12 KG/M2 | DIASTOLIC BLOOD PRESSURE: 73 MMHG | WEIGHT: 175 LBS | OXYGEN SATURATION: 92 %

## 2024-01-05 PROCEDURE — G0378 HOSPITAL OBSERVATION PER HR: HCPCS

## 2024-01-05 PROCEDURE — 6360000002 HC RX W HCPCS: Performed by: ORTHOPAEDIC SURGERY

## 2024-01-05 PROCEDURE — 97535 SELF CARE MNGMENT TRAINING: CPT

## 2024-01-05 PROCEDURE — 97165 OT EVAL LOW COMPLEX 30 MIN: CPT

## 2024-01-05 PROCEDURE — 6370000000 HC RX 637 (ALT 250 FOR IP): Performed by: ORTHOPAEDIC SURGERY

## 2024-01-05 PROCEDURE — 97110 THERAPEUTIC EXERCISES: CPT

## 2024-01-05 PROCEDURE — 2580000003 HC RX 258: Performed by: ORTHOPAEDIC SURGERY

## 2024-01-05 RX ADMIN — KETOROLAC TROMETHAMINE 15 MG: 30 INJECTION INTRAMUSCULAR; INTRAVENOUS at 00:53

## 2024-01-05 RX ADMIN — ACETAMINOPHEN 650 MG: 325 TABLET ORAL at 00:52

## 2024-01-05 RX ADMIN — ACETAMINOPHEN 650 MG: 325 TABLET ORAL at 06:37

## 2024-01-05 RX ADMIN — FAMOTIDINE 20 MG: 20 TABLET, FILM COATED ORAL at 08:50

## 2024-01-05 RX ADMIN — LOSARTAN POTASSIUM 50 MG: 50 TABLET, FILM COATED ORAL at 08:50

## 2024-01-05 RX ADMIN — MULTIPLE VITAMINS W/ MINERALS TAB 1 TABLET: TAB at 08:50

## 2024-01-05 RX ADMIN — WATER 2000 MG: 1 INJECTION INTRAMUSCULAR; INTRAVENOUS; SUBCUTANEOUS at 00:56

## 2024-01-05 RX ADMIN — SODIUM CHLORIDE, PRESERVATIVE FREE 10 ML: 5 INJECTION INTRAVENOUS at 08:52

## 2024-01-05 RX ADMIN — AMIODARONE HYDROCHLORIDE 50 MG: 200 TABLET ORAL at 08:50

## 2024-01-05 RX ADMIN — OXYCODONE HYDROCHLORIDE 5 MG: 5 TABLET ORAL at 04:03

## 2024-01-05 RX ADMIN — KETOROLAC TROMETHAMINE 15 MG: 30 INJECTION INTRAMUSCULAR; INTRAVENOUS at 06:37

## 2024-01-05 ASSESSMENT — PAIN DESCRIPTION - LOCATION
LOCATION: HAND
LOCATION: HAND;SHOULDER

## 2024-01-05 ASSESSMENT — PAIN SCALES - GENERAL
PAINLEVEL_OUTOF10: 5
PAINLEVEL_OUTOF10: 6

## 2024-01-05 ASSESSMENT — PAIN DESCRIPTION - DESCRIPTORS
DESCRIPTORS: ACHING
DESCRIPTORS: ACHING

## 2024-01-05 ASSESSMENT — PAIN DESCRIPTION - ORIENTATION
ORIENTATION: RIGHT
ORIENTATION: RIGHT;ANTERIOR

## 2024-01-05 NOTE — PLAN OF CARE
Problem: Pain  Goal: Verbalizes/displays adequate comfort level or baseline comfort level  Outcome: Progressing     Problem: Safety - Adult  Goal: Free from fall injury  1/4/2024 5808 by Issa Coleman RN  Outcome: Progressing  1/4/2024 1338 by Velia Crews LPN  Outcome: Progressing     Problem: Skin/Tissue Integrity  Goal: Absence of new skin breakdown  Description: 1.  Monitor for areas of redness and/or skin breakdown  2.  Assess vascular access sites hourly  3.  Every 4-6 hours minimum:  Change oxygen saturation probe site  4.  Every 4-6 hours:  If on nasal continuous positive airway pressure, respiratory therapy assess nares and determine need for appliance change or resting period.  Outcome: Progressing

## 2024-01-05 NOTE — PROGRESS NOTES
End of Shift Note    Bedside shift change report given to Eloisa GRIMES (oncoming nurse) by Velia Crews LPN (offgoing nurse).  Report included the following information SBAR, Kardex, Intake/Output, and MAR    Shift worked:  [id number]     Shift summary and any significant changes:    Room air   Family present during shift   Tolerated diet   Tolerated medication   Output   Input   Family present during shift    None  None    None  None        Activity:     Number times ambulated in hallways past shift: 0  Number of times OOB to chair past shift: 1    Cardiac:   Cardiac Monitoring: No           Access:  Current line(s): PIV     Genitourinary:   Urinary status: voiding    Respiratory:      Chronic home O2 use?: NO  Incentive spirometer at bedside: NO       GI:     Current diet:  ADULT DIET; Regular  Passing flatus: YES  Tolerating current diet: YES       Pain Management:   Patient states pain is manageable on current regimen: YES    Skin:     Interventions: specialty bed, float heels, increase time out of bed, and foam dressing    Patient Safety:  Fall Score:    Interventions: bed/chair alarm, assistive device (walker, cane. etc), gripper socks, and pt to call before getting OOB       Length of Stay:  Expected LOS: 1  Actual LOS: 0      Velia Crews LPN

## 2024-01-05 NOTE — PROGRESS NOTES
POD 1 Day Post-Op    Procedure:  Procedure(s):  RIGHT REVERSE TOTAL SHOULDER ARTHROPLASTY (GEN/BLOCK)    Subjective:     no acute events overnight  pain controlled  tolerating PO  Some  numbness/paresthesias in operative extremity due to block  denies fevers/chills/chest pain/shortness of breath/chest pain/nausea/vomiting.      Objective:     Blood pressure 109/71, pulse 53, temperature 97.5 °F (36.4 °C), temperature source Oral, resp. rate 18, height 1.676 m (5' 6\"), weight 79.4 kg (175 lb), SpO2 96 %.  Temp (24hrs), Av.2 °F (36.8 °C), Min:97.5 °F (36.4 °C), Max:98.7 °F (37.1 °C)       Physical Exam:  RUE: dressing CDI  - SILT M/R/U/Ax, some numbness in hand 2/2 block  - Fires ain/pin/m/r/u (FPL, EPL, wrist ext, FDP to SF, finger ab/adduction)  - 2+ radial pulse  - WWP fingers  - compartments soft and compressible      Labs:   Lab Results   Component Value Date/Time    HGB 14.9 2023 09:49 AM    INR 1.2 2023 09:49 AM         Assessment:     Principal Problem:    Right rotator cuff tear arthropathy  Active Problems:    Secondary osteoarthritis of right shoulder due to rotator cuff arthropathy  Resolved Problems:    * No resolved hospital problems. *      Plan/Recommendations/Medical Decision Making:   S/p right RTSA  with bonnie Johnson  OT  Pain control   Ice and elevate  Begin discharge planning    Plan for dc home today after therapy     Kulwinder BAGLEY Sports Fellow

## 2024-01-05 NOTE — PLAN OF CARE
Problem: Safety - Adult  Goal: Free from fall injury  1/5/2024 1100 by Elodia Dubois RN  Outcome: Adequate for Discharge  1/4/2024 2258 by Issa Coleman RN  Outcome: Progressing     Problem: Pain  Goal: Verbalizes/displays adequate comfort level or baseline comfort level  1/5/2024 1100 by Elodia Dubois RN  Outcome: Adequate for Discharge  1/4/2024 2258 by Issa Coleman RN  Outcome: Progressing     Problem: Discharge Planning  Goal: Discharge to home or other facility with appropriate resources  Outcome: Adequate for Discharge     Problem: Skin/Tissue Integrity  Goal: Absence of new skin breakdown  Description: 1.  Monitor for areas of redness and/or skin breakdown  2.  Assess vascular access sites hourly  3.  Every 4-6 hours minimum:  Change oxygen saturation probe site  4.  Every 4-6 hours:  If on nasal continuous positive airway pressure, respiratory therapy assess nares and determine need for appliance change or resting period.  1/5/2024 1100 by Elodia Dubois RN  Outcome: Adequate for Discharge  1/4/2024 2258 by Issa Coleman RN  Outcome: Progressing

## 2024-01-05 NOTE — DISCHARGE SUMMARY
Discharge Summary     Patient ID:  Ander Yepez  [id number]  73 y.o.  1950    Admit Date: 1/4/2024    Discharge Date: 1/5/2024    Admission Diagnoses: Glenohumeral arthritis, right [M19.011]  Right rotator cuff tear arthropathy [M75.101, M12.811]    Surgeon: Josiah Chan DO    Last Procedure: Procedure(s):  RIGHT REVERSE TOTAL SHOULDER ARTHROPLASTY (GEN/BLOCK)      Hospital Course: s/p right RTSA. Admitted for pain control and therapy and did well.  NVI     Significant Diagnostic Studies: none    Discharge Diagnosis: Principal Problem:    Right rotator cuff tear arthropathy  Active Problems:    Secondary osteoarthritis of right shoulder due to rotator cuff arthropathy  Resolved Problems:    * No resolved hospital problems. *       Condition on Discharge: Good    Disposition:Home    Followup Care:  Discharge Condition: Good  activity as tolerated  regular diet  keep wound clean and dry    [unfilled]    [place] med list:      Medication List        START taking these medications      oxyCODONE 5 MG immediate release tablet  Commonly known as: ROXICODONE  Take 1-2 tablets by mouth every 4 hours as needed for Pain for up to 5 days. Max Daily Amount: 60 mg            CONTINUE taking these medications      amiodarone 100 MG tablet  Commonly known as: PACERONE     b complex vitamins capsule  Take 1 capsule by mouth daily     cetirizine 10 MG tablet  Commonly known as: ZYRTEC     hydroCHLOROthiazide 25 MG tablet  Commonly known as: HYDRODIURIL     losartan 50 MG tablet  Commonly known as: COZAAR  Take 1 tablet by mouth once daily     rivaroxaban 20 MG Tabs tablet  Commonly known as: XARELTO     sildenafil 100 MG tablet  Commonly known as: VIAGRA     therapeutic multivitamin-minerals tablet               Where to Get Your Medications        These medications were sent to [facility] [address] [place] [address] [address] - P [phone] - F [phone]  [address] [facility] [facility] [address]

## 2024-01-05 NOTE — PROGRESS NOTES
OCCUPATIONAL THERAPY EVALUATION/DISCHARGE  Patient: Ander Yepez (73 y.o. male)  Date: 1/5/2024  Primary Diagnosis: Glenohumeral arthritis, right [M19.011]  Right rotator cuff tear arthropathy [M75.101, M12.811]  Procedure(s) (LRB):  RIGHT REVERSE TOTAL SHOULDER ARTHROPLASTY (GEN/BLOCK) (Right) 1 Day Post-Op     Precautions:                    ASSESSMENT :  Based on the objective data below, the patient is operating at supervision-Min A following R reverse TSA post-op day 1. Pt cleared for therapy by nursing and received supine in bed agreeable to therapy. Pt reporting tingling, cold sensation in R hand. Full ROM at fingers, wrist and elbow. Completed dressing with Min A and educated pt on precautions and exercises. Pt demo'ing excellent understanding and with good support at home. No concerns for safety in ADLs upon discharge and no further OT needs. Pt upright eating breakfast at end of session.     Functional Outcome Measure:  The patient scored 20/24 on the [facility] outcome measure which is indicative of discharge home vs rehab.      Further skilled acute occupational therapy is not indicated at this time.     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

## 2024-01-05 NOTE — PROGRESS NOTES
Discharge paperwork reviewed with patient and spouse. Shoulder precautions reviewed. All questions were answered. Patient taken downstairs via wheelchair to discharge location.

## 2024-01-12 NOTE — TELEPHONE ENCOUNTER
Requested Prescriptions     Signed Prescriptions Disp Refills    rivaroxaban (XARELTO) 20 MG TABS tablet 90 tablet 1     Sig: Take 1 tablet by mouth Daily with supper     Authorizing Provider: ZECHARIAH LIM     Ordering User: CRISTINO RAINEY     Verbal order per Dr. Lim.    Future Appointments   Date Time Provider Department Center   2/2/2024 10:20 AM Zechariah Lim MD [facility] AMB   2/7/2024 10:00 AM Fe West MD [facility] AMB   6/10/2024  1:15 PM Florian Lees MD [facility] AMB   9/24/2024 10:10 AM Aissatou Ocampo, APRN - NP [facility] AMB

## 2024-01-24 DIAGNOSIS — I10 PRIMARY HYPERTENSION: ICD-10-CM

## 2024-01-24 DIAGNOSIS — I48.0 PAROXYSMAL ATRIAL FIBRILLATION (HCC): ICD-10-CM

## 2024-01-24 LAB
ALBUMIN SERPL-MCNC: 4 G/DL (ref 3.5–5)
ALBUMIN/GLOB SERPL: 1.5 (ref 1.1–2.2)
ALP SERPL-CCNC: 74 U/L (ref 45–117)
ALT SERPL-CCNC: 25 U/L (ref 12–78)
AST SERPL-CCNC: 20 U/L (ref 15–37)
BILIRUB DIRECT SERPL-MCNC: 0.2 MG/DL (ref 0–0.2)
BILIRUB SERPL-MCNC: 0.5 MG/DL (ref 0.2–1)
GLOBULIN SER CALC-MCNC: 2.7 G/DL (ref 2–4)
PROT SERPL-MCNC: 6.7 G/DL (ref 6.4–8.2)
TSH SERPL DL<=0.05 MIU/L-ACNC: 1.6 UIU/ML (ref 0.36–3.74)

## 2024-01-26 ENCOUNTER — TELEPHONE (OUTPATIENT)
Age: 74
End: 2024-01-26

## 2024-01-26 NOTE — TELEPHONE ENCOUNTER
Called pt to r/s 2/2 appt with Raphael. Pt had blood work done 1/24 and wants to know will he have to have blood work done again?  R/s on 4/9.    Pt # [phone]

## 2024-01-26 NOTE — TELEPHONE ENCOUNTER
Identifiers x 2. Informed that labs do not need to be repeated prior to follow up. Verbalized understanding.

## 2024-03-13 RX ORDER — LOSARTAN POTASSIUM 50 MG/1
50 TABLET ORAL DAILY
Qty: 90 TABLET | Refills: 1 | Status: SHIPPED | OUTPATIENT
Start: 2024-03-13

## 2024-03-13 NOTE — TELEPHONE ENCOUNTER
Requested Prescriptions     Signed Prescriptions Disp Refills    losartan (COZAAR) 50 MG tablet 90 tablet 1     Sig: Take 1 tablet by mouth once daily     Authorizing Provider: ZECHARIAH LIM     Ordering User: CRISTINO RAINEY     Verbal order per Dr. Lim.    Future Appointments   Date Time Provider Department Center   4/9/2024 10:00 AM Zechariah Lim MD [facility] AMB   6/10/2024  1:15 PM Florian Lees MD [facility] AMB   7/8/2024  9:00 AM Fe West MD [facility] AMB   9/24/2024 10:10 AM Aissatou Ocampo, APRN - NP [facility] AMB

## 2024-03-29 ENCOUNTER — PATIENT MESSAGE (OUTPATIENT)
Age: 74
End: 2024-03-29

## 2024-03-29 DIAGNOSIS — G47.33 OSA (OBSTRUCTIVE SLEEP APNEA): Primary | ICD-10-CM

## 2024-04-05 ENCOUNTER — TELEPHONE (OUTPATIENT)
Age: 74
End: 2024-04-05

## 2024-04-05 NOTE — TELEPHONE ENCOUNTER
From: Ander Yepez  To: Aissatou Ocampo  Sent: 3/29/2024 11:03 AM EDT  Subject: Prescription for PAP therapy equipment     I have been contacted by Florentin Otero , medical records representative for [facility]. He needs an up dated prescription from you in order to fill my order. [phone]  My last appointment was less than a year ago.   Thank you

## 2024-04-05 NOTE — TELEPHONE ENCOUNTER
Orders Placed This Encounter   Procedures    DME Order for (Specify) as OP     Diagnosis: (G47.33) BECKY (obstructive sleep apnea)  (primary encounter diagnosis)     Replacement Supplies for Positive Airway Pressure Therapy Device:   Duration of need: 99 months.      Full Face Mask 1 every 3 months.   Full Face Mask Cushion 1 per month.       Headgear 1 every 6 months.   Positive Airway Pressure chinstrap 1 every 6 months.     Tubing with heating element 1 every 3 months.     Filter(s) Disposable 2 per month.   Filter(s) Non-Disposable 1 every 6 months.   .    Water Chamber for Humidifier (Replace) 1 every 6 months.    ETHAN Baugh NPI: [id number]    Electronically signed. Date:- 04/05/24     ETHAN Baugh, [facility]   Nurse Practitioner  [facility]

## 2024-04-08 ENCOUNTER — CLINICAL DOCUMENTATION (OUTPATIENT)
Age: 74
End: 2024-04-08

## 2024-04-09 ENCOUNTER — OFFICE VISIT (OUTPATIENT)
Age: 74
End: 2024-04-09
Payer: MEDICARE

## 2024-04-09 VITALS
HEIGHT: 66 IN | WEIGHT: 179 LBS | RESPIRATION RATE: 16 BRPM | SYSTOLIC BLOOD PRESSURE: 120 MMHG | HEART RATE: 53 BPM | DIASTOLIC BLOOD PRESSURE: 80 MMHG | BODY MASS INDEX: 28.77 KG/M2 | OXYGEN SATURATION: 98 %

## 2024-04-09 DIAGNOSIS — I48.0 PAROXYSMAL ATRIAL FIBRILLATION (HCC): Primary | ICD-10-CM

## 2024-04-09 PROCEDURE — 3079F DIAST BP 80-89 MM HG: CPT | Performed by: SPECIALIST

## 2024-04-09 PROCEDURE — 99214 OFFICE O/P EST MOD 30 MIN: CPT | Performed by: SPECIALIST

## 2024-04-09 PROCEDURE — 93005 ELECTROCARDIOGRAM TRACING: CPT | Performed by: SPECIALIST

## 2024-04-09 PROCEDURE — 1036F TOBACCO NON-USER: CPT | Performed by: SPECIALIST

## 2024-04-09 PROCEDURE — 93010 ELECTROCARDIOGRAM REPORT: CPT | Performed by: SPECIALIST

## 2024-04-09 PROCEDURE — 3074F SYST BP LT 130 MM HG: CPT | Performed by: SPECIALIST

## 2024-04-09 PROCEDURE — 1123F ACP DISCUSS/DSCN MKR DOCD: CPT | Performed by: SPECIALIST

## 2024-04-09 PROCEDURE — G8417 CALC BMI ABV UP PARAM F/U: HCPCS | Performed by: SPECIALIST

## 2024-04-09 PROCEDURE — G8427 DOCREV CUR MEDS BY ELIG CLIN: HCPCS | Performed by: SPECIALIST

## 2024-04-09 ASSESSMENT — PATIENT HEALTH QUESTIONNAIRE - PHQ9
1. LITTLE INTEREST OR PLEASURE IN DOING THINGS: NOT AT ALL
SUM OF ALL RESPONSES TO PHQ QUESTIONS 1-9: 0
SUM OF ALL RESPONSES TO PHQ9 QUESTIONS 1 & 2: 0
2. FEELING DOWN, DEPRESSED OR HOPELESS: NOT AT ALL
SUM OF ALL RESPONSES TO PHQ QUESTIONS 1-9: 0

## 2024-04-09 NOTE — PROGRESS NOTES
the future.  He will let me know if any symptoms.       He has undergone eye examination in June 2023 he tells me everything was normal.     2.  Cardiomyopathy: I suspect this was related to is atrial fibrillation rapid ventricular response.  We have discussed results of echocardiogram of July 2023 with normal ejection fraction mild MR TR and AI.     We will continue off metoprolol and losartan.    3.  CAD: Previous abnormal calcium score.(203 on 2019)  Nuclear stress test in December 2021 essentially with no ischemia.     4.  Hypertension: Well-controlled     5.  Hyperlipidemia: Closely followed by his primary care physician.     5.  Status post left total knee replacement in the fall 2022    We have discussed potential for atrial fibrillation ablation if recurrent atrial fibrillation for inability to continue amiodarone.  For now this will be on the back burner.    I offered to him to proceed with the EP evaluation he wants to hold off at this point.    We discussed nutrition and exercise a potential use of PDE inhibitors.     Otherwise I will see him back in 6 months with liver function test and TSH 1 week prior.              HPI     74 y.o. male.Patient with h/o HTN seen here for evaluation of HTN and bradycardia  ECHO on 4/17:Left ventricle: Systolic function was normal. Ejection fraction was  estimated in the range of 55 % to 60 %. There were no regional wall motion  abnormalities.    Mitral valve: There was mild regurgitation.    Aortic valve: Normal valve structure. The valve was trileaflet. Leaflets  exhibited mildly increased thickness, normal cuspal separation, and  sclerosis.    Tricuspid valve: There was mild regurgitation.  HOLTER on 5/9/17: sinus bradycardia  with average of 58 frequent pacs and rare pvcs, 8 episodes of possible PAT with the longest of 31 beats at 165     Ca score on 6/19:Left main coronary artery: 0   Left anterior descending coronary artery: 170  Left circumflex coronary

## 2024-05-28 RX ORDER — AMIODARONE HYDROCHLORIDE 100 MG/1
TABLET ORAL
Qty: 45 TABLET | Refills: 1 | Status: SHIPPED | OUTPATIENT
Start: 2024-05-28

## 2024-05-28 NOTE — TELEPHONE ENCOUNTER
Cardiologist: Dr. Lim    Future Appointments   Date Time Provider Department Center   6/10/2024  1:15 PM Florian Lees MD [facility] BS AMB   6/26/2024 10:00 AM Flora Allen MD [facility] AMB   7/8/2024  9:00 AM Fe West MD [facility] BS AMB   9/24/2024 10:10 AM Aissatou Ocampo, MARIELA - NP [facility] BS AMB   10/11/2024 10:20 AM Zechariah Lim MD [facility] AMB       Requested Prescriptions     Signed Prescriptions Disp Refills    amiodarone (PACERONE) 100 MG tablet 45 tablet 1     Sig: Take 1/2 (one-half) tablet by mouth once daily     Authorizing Provider: ZECHARIAH LIM     Ordering User: GILLES PRATT         Refills VO per Dr. Lim.

## 2024-06-10 ENCOUNTER — OFFICE VISIT (OUTPATIENT)
Age: 74
End: 2024-06-10
Payer: MEDICARE

## 2024-06-10 VITALS
BODY MASS INDEX: 29.35 KG/M2 | RESPIRATION RATE: 15 BRPM | HEIGHT: 66 IN | WEIGHT: 182.6 LBS | SYSTOLIC BLOOD PRESSURE: 125 MMHG | DIASTOLIC BLOOD PRESSURE: 82 MMHG | OXYGEN SATURATION: 97 % | HEART RATE: 59 BPM | TEMPERATURE: 98.2 F

## 2024-06-10 DIAGNOSIS — Z12.5 ENCOUNTER FOR SCREENING FOR MALIGNANT NEOPLASM OF PROSTATE: ICD-10-CM

## 2024-06-10 DIAGNOSIS — R73.01 IMPAIRED FASTING GLUCOSE: ICD-10-CM

## 2024-06-10 DIAGNOSIS — E78.2 MIXED HYPERLIPIDEMIA: ICD-10-CM

## 2024-06-10 DIAGNOSIS — I10 PRIMARY HYPERTENSION: ICD-10-CM

## 2024-06-10 DIAGNOSIS — I47.19 ATRIAL TACHYCARDIA (HCC): ICD-10-CM

## 2024-06-10 DIAGNOSIS — Z00.00 MEDICARE ANNUAL WELLNESS VISIT, SUBSEQUENT: Primary | ICD-10-CM

## 2024-06-10 PROCEDURE — G0439 PPPS, SUBSEQ VISIT: HCPCS | Performed by: INTERNAL MEDICINE

## 2024-06-10 PROCEDURE — G8427 DOCREV CUR MEDS BY ELIG CLIN: HCPCS | Performed by: INTERNAL MEDICINE

## 2024-06-10 PROCEDURE — 3079F DIAST BP 80-89 MM HG: CPT | Performed by: INTERNAL MEDICINE

## 2024-06-10 PROCEDURE — 99214 OFFICE O/P EST MOD 30 MIN: CPT | Performed by: INTERNAL MEDICINE

## 2024-06-10 PROCEDURE — 1036F TOBACCO NON-USER: CPT | Performed by: INTERNAL MEDICINE

## 2024-06-10 PROCEDURE — 1123F ACP DISCUSS/DSCN MKR DOCD: CPT | Performed by: INTERNAL MEDICINE

## 2024-06-10 PROCEDURE — G8417 CALC BMI ABV UP PARAM F/U: HCPCS | Performed by: INTERNAL MEDICINE

## 2024-06-10 PROCEDURE — 3074F SYST BP LT 130 MM HG: CPT | Performed by: INTERNAL MEDICINE

## 2024-06-10 SDOH — ECONOMIC STABILITY: FOOD INSECURITY: WITHIN THE PAST 12 MONTHS, THE FOOD YOU BOUGHT JUST DIDN'T LAST AND YOU DIDN'T HAVE MONEY TO GET MORE.: NEVER TRUE

## 2024-06-10 SDOH — ECONOMIC STABILITY: FOOD INSECURITY: WITHIN THE PAST 12 MONTHS, YOU WORRIED THAT YOUR FOOD WOULD RUN OUT BEFORE YOU GOT MONEY TO BUY MORE.: NEVER TRUE

## 2024-06-10 SDOH — ECONOMIC STABILITY: INCOME INSECURITY: HOW HARD IS IT FOR YOU TO PAY FOR THE VERY BASICS LIKE FOOD, HOUSING, MEDICAL CARE, AND HEATING?: NOT HARD AT ALL

## 2024-06-10 SDOH — HEALTH STABILITY: PHYSICAL HEALTH: ON AVERAGE, HOW MANY DAYS PER WEEK DO YOU ENGAGE IN MODERATE TO STRENUOUS EXERCISE (LIKE A BRISK WALK)?: 3 DAYS

## 2024-06-10 SDOH — HEALTH STABILITY: PHYSICAL HEALTH: ON AVERAGE, HOW MANY MINUTES DO YOU ENGAGE IN EXERCISE AT THIS LEVEL?: 30 MIN

## 2024-06-10 ASSESSMENT — PATIENT HEALTH QUESTIONNAIRE - PHQ9
SUM OF ALL RESPONSES TO PHQ QUESTIONS 1-9: 0
SUM OF ALL RESPONSES TO PHQ9 QUESTIONS 1 & 2: 0
SUM OF ALL RESPONSES TO PHQ QUESTIONS 1-9: 0
2. FEELING DOWN, DEPRESSED OR HOPELESS: NOT AT ALL
1. LITTLE INTEREST OR PLEASURE IN DOING THINGS: NOT AT ALL
SUM OF ALL RESPONSES TO PHQ QUESTIONS 1-9: 0
SUM OF ALL RESPONSES TO PHQ QUESTIONS 1-9: 0

## 2024-06-10 ASSESSMENT — LIFESTYLE VARIABLES
HOW OFTEN DO YOU HAVE SIX OR MORE DRINKS ON ONE OCCASION: 1
HOW MANY STANDARD DRINKS CONTAINING ALCOHOL DO YOU HAVE ON A TYPICAL DAY: 1 OR 2
HOW OFTEN DO YOU HAVE A DRINK CONTAINING ALCOHOL: 2-3 TIMES A WEEK
HOW OFTEN DO YOU HAVE A DRINK CONTAINING ALCOHOL: 4
HOW MANY STANDARD DRINKS CONTAINING ALCOHOL DO YOU HAVE ON A TYPICAL DAY: 1

## 2024-06-10 NOTE — PROGRESS NOTES
Interventions:    Reviewed medications, home hazards, visual acuity, and co-morbidities that can increase risk for falls  See AVS for additional education material             Activity, Diet, and Weight:  On average, how many days per week do you engage in moderate to strenuous exercise (like a brisk walk)?: 3 days  On average, how many minutes do you engage in exercise at this level?: 30 min    Do you eat balanced/healthy meals regularly?: (!) No    Body mass index is 29.47 kg/m².    Do you eat balanced/healthy meals regularly Interventions:  See AVS for additional education material             Advanced Directives:  Do you have a Living Will?: (!) No    Intervention:  see ACP note                     Objective   Vitals:    06/10/24 1315   BP: 125/82   Pulse: 59   Resp: 15   Temp: 98.2 °F (36.8 °C)   SpO2: 97%   Weight: 82.8 kg (182 lb 9.6 oz)   Height: 1.676 m (5' 6\")      Body mass index is 29.47 kg/m².        General Appearance: alert and oriented to person, place and time, well-developed and well-nourished, in no acute distress  Head: normocephalic and atraumatic  ENT: tympanic membrane, external ear and ear canal normal bilaterally, oropharynx clear and moist with normal mucous membranes  Neck: neck supple and non tender without mass, no thyromegaly or thyroid nodules, no cervical lymphadenopathy   Pulmonary/Chest: clear to auscultation bilaterally- no wheezes, rales or rhonchi, normal air movement, no respiratory distress  Cardiovascular: normal rate, normal S1 and S2, no gallops, intact distal pulses, and no carotid bruits  Abdomen: soft, non-tender, non-distended, normal bowel sounds, no masses or organomegaly  Extremities: no cyanosis and no clubbing  Musculoskeletal: normal range of motion, no joint swelling, deformity or tenderness  Neurologic: gait and coordination normal and speech normal       No Known Allergies  Prior to Visit Medications    Medication Sig Taking? Authorizing Provider   amiodarone

## 2024-06-11 LAB
ALBUMIN SERPL-MCNC: 4 G/DL (ref 3.5–5)
ALBUMIN/GLOB SERPL: 1.6 (ref 1.1–2.2)
ALP SERPL-CCNC: 63 U/L (ref 45–117)
ALT SERPL-CCNC: 32 U/L (ref 12–78)
ANION GAP SERPL CALC-SCNC: 5 MMOL/L (ref 5–15)
AST SERPL-CCNC: 27 U/L (ref 15–37)
BASOPHILS # BLD: 0.1 K/UL (ref 0–0.1)
BASOPHILS NFR BLD: 1 % (ref 0–1)
BILIRUB SERPL-MCNC: 0.7 MG/DL (ref 0.2–1)
BUN SERPL-MCNC: 15 MG/DL (ref 6–20)
BUN/CREAT SERPL: 15 (ref 12–20)
CALCIUM SERPL-MCNC: 9.6 MG/DL (ref 8.5–10.1)
CHLORIDE SERPL-SCNC: 105 MMOL/L (ref 97–108)
CHOLEST SERPL-MCNC: 163 MG/DL
CO2 SERPL-SCNC: 29 MMOL/L (ref 21–32)
CREAT SERPL-MCNC: 1 MG/DL (ref 0.7–1.3)
DIFFERENTIAL METHOD BLD: ABNORMAL
EOSINOPHIL # BLD: 0.2 K/UL (ref 0–0.4)
EOSINOPHIL NFR BLD: 3 % (ref 0–7)
ERYTHROCYTE [DISTWIDTH] IN BLOOD BY AUTOMATED COUNT: 15.5 % (ref 11.5–14.5)
GLOBULIN SER CALC-MCNC: 2.5 G/DL (ref 2–4)
GLUCOSE SERPL-MCNC: 82 MG/DL (ref 65–100)
HCT VFR BLD AUTO: 43.7 % (ref 36.6–50.3)
HDLC SERPL-MCNC: 67 MG/DL
HDLC SERPL: 2.4 (ref 0–5)
HGB BLD-MCNC: 14.4 G/DL (ref 12.1–17)
IMM GRANULOCYTES # BLD AUTO: 0 K/UL (ref 0–0.04)
IMM GRANULOCYTES NFR BLD AUTO: 0 % (ref 0–0.5)
LDLC SERPL CALC-MCNC: 84 MG/DL (ref 0–100)
LYMPHOCYTES # BLD: 1.6 K/UL (ref 0.8–3.5)
LYMPHOCYTES NFR BLD: 21 % (ref 12–49)
MCH RBC QN AUTO: 28.7 PG (ref 26–34)
MCHC RBC AUTO-ENTMCNC: 33 G/DL (ref 30–36.5)
MCV RBC AUTO: 87.1 FL (ref 80–99)
MONOCYTES # BLD: 0.7 K/UL (ref 0–1)
MONOCYTES NFR BLD: 9 % (ref 5–13)
NEUTS SEG # BLD: 5.2 K/UL (ref 1.8–8)
NEUTS SEG NFR BLD: 66 % (ref 32–75)
NRBC # BLD: 0 K/UL (ref 0–0.01)
NRBC BLD-RTO: 0 PER 100 WBC
PLATELET # BLD AUTO: 317 K/UL (ref 150–400)
PMV BLD AUTO: 11.6 FL (ref 8.9–12.9)
POTASSIUM SERPL-SCNC: 4.4 MMOL/L (ref 3.5–5.1)
PROT SERPL-MCNC: 6.5 G/DL (ref 6.4–8.2)
PSA SERPL-MCNC: 1 NG/ML (ref 0.01–4)
RBC # BLD AUTO: 5.02 M/UL (ref 4.1–5.7)
SODIUM SERPL-SCNC: 139 MMOL/L (ref 136–145)
TRIGL SERPL-MCNC: 60 MG/DL
TSH SERPL DL<=0.05 MIU/L-ACNC: 1.52 UIU/ML (ref 0.36–3.74)
VLDLC SERPL CALC-MCNC: 12 MG/DL
WBC # BLD AUTO: 7.8 K/UL (ref 4.1–11.1)

## 2024-06-26 ENCOUNTER — TELEPHONE (OUTPATIENT)
Age: 74
End: 2024-06-26

## 2024-06-26 ENCOUNTER — OFFICE VISIT (OUTPATIENT)
Age: 74
End: 2024-06-26
Payer: MEDICARE

## 2024-06-26 VITALS
WEIGHT: 183.6 LBS | DIASTOLIC BLOOD PRESSURE: 70 MMHG | SYSTOLIC BLOOD PRESSURE: 128 MMHG | HEIGHT: 66 IN | OXYGEN SATURATION: 97 % | BODY MASS INDEX: 29.51 KG/M2 | HEART RATE: 54 BPM

## 2024-06-26 DIAGNOSIS — I47.19 ATRIAL TACHYCARDIA (HCC): Primary | ICD-10-CM

## 2024-06-26 DIAGNOSIS — I48.91 ATRIAL FIBRILLATION, UNSPECIFIED TYPE (HCC): Primary | ICD-10-CM

## 2024-06-26 PROCEDURE — G8417 CALC BMI ABV UP PARAM F/U: HCPCS | Performed by: INTERNAL MEDICINE

## 2024-06-26 PROCEDURE — 99204 OFFICE O/P NEW MOD 45 MIN: CPT | Performed by: INTERNAL MEDICINE

## 2024-06-26 PROCEDURE — 3074F SYST BP LT 130 MM HG: CPT | Performed by: INTERNAL MEDICINE

## 2024-06-26 PROCEDURE — 1123F ACP DISCUSS/DSCN MKR DOCD: CPT | Performed by: INTERNAL MEDICINE

## 2024-06-26 PROCEDURE — 99214 OFFICE O/P EST MOD 30 MIN: CPT | Performed by: INTERNAL MEDICINE

## 2024-06-26 PROCEDURE — 1036F TOBACCO NON-USER: CPT | Performed by: INTERNAL MEDICINE

## 2024-06-26 PROCEDURE — G8427 DOCREV CUR MEDS BY ELIG CLIN: HCPCS | Performed by: INTERNAL MEDICINE

## 2024-06-26 PROCEDURE — 3078F DIAST BP <80 MM HG: CPT | Performed by: INTERNAL MEDICINE

## 2024-06-26 ASSESSMENT — PATIENT HEALTH QUESTIONNAIRE - PHQ9
SUM OF ALL RESPONSES TO PHQ QUESTIONS 1-9: 0
1. LITTLE INTEREST OR PLEASURE IN DOING THINGS: NOT AT ALL
SUM OF ALL RESPONSES TO PHQ9 QUESTIONS 1 & 2: 0
2. FEELING DOWN, DEPRESSED OR HOPELESS: NOT AT ALL

## 2024-06-26 NOTE — TELEPHONE ENCOUNTER
Patient was seen in office today and was scheduled for Watchman with Dr. Allen on 8/27 @ 10:00 am with 8:00 am arrival. Instructions gone over with patient, advised to have labs between 8/19-8/23, and to hold Xarelto 2 days prior to procedure. Patient verbalized understanding and had no questions at the time of scheduling.    Patient identification verified x2.           Patient Instructions    Watchman       PCI     Bilateral Angio w/ runoff    PFO  Pre-procedure instructions  Lab work: Please do between 8/19-8/23  [facility] Draw Sites:  [facility] [place]: [address] [facility] [address]  [place]: [address] [address] [address]  [place] [address] [place] [address] [facility] [address]  [facility]: [address] [facility] [address]  [place]: [address] [facility] [address]  [facility]: [address] [facility] [address]  [place]: [address] [facility] [address]  The night before the procedure nothing to eat or drink after midnight, you may take approved medications the morning of the procedure with a few sips of water.  Stop blood thinners 2 days prior to procedure EXCEPT: Brilinta, Plavix or Aspirin  Medication restrictions: Hold Xarelto 2 days prior to the procedure.    Procedure day  Have a  that will bring you and take you home  Bring ID and insurance card  Wear comfortable clothing  Leave valuables at home, bring: dentures, hearing aids, or glasses  Bring overnight bag   Where to report  [facility]: go through main entrance and to the left is outpatient registration  [facility] patients report to first floor outpatient registration     Date of procedure: 8/27 w/ Dr. Allen  Arrival Time: 8:00 am    Post procedure instructions  No driving for 24 hours  No heavy lifting (over 10lbs) or strenuous activity for 48hrs  No baths, swimming, hot tubs, or spas for a week  The band aid over the

## 2024-06-26 NOTE — PROGRESS NOTES
Dr. Alejandro Correa [facility] Cardiology.  [phone]            Cardiology structural heart disease consult/Progress Note      Requesting/referring provider: Florian Lees MD    Reason for Consult:    Assessment/Plan:  1.  Cardiomyopathy with congestive heart failure  2.  Recurrent persistent atrial fibrillation  3.  Hypertension  4.  Coronary artery disease  5.  Hyperlipidemia  6.  History of fall    The patient has a CHADSVASC/CHADS 2 score of 3.  He/she should avoid long term anticoagulation due to difficulty affording Eliquis.  He also has easy bruisability and has been having multiple subcutaneous hematomas.  The patient feels strongly that anticoagulation and documented stroke risk greatly impacts his/her quality of life. The patient is a candidate for Watchman, a left atrial appendage closure (LAAC) device to reduce risk of thromboembolism. The patient has need for anticoagulation and is considered a high risk for stroke, but has a relative contraindication for long term anticoagulation. The patient is suitable for short term warfarin but deemed unable to take long term oral anticoagulation following the conclusion of shared decision making interaction with the patient. I have discussed at length the risks/benefits and alternatives of this procedure with regards to stroke risk versus bleeding risk. The patient understands that anticoagulation will be maintained in a variety of forms during the first year and agrees with plan. Risks include but not limited to: infection, bleeding, vessel injury, cardiac perforation at times requiring drainage or surgery, heart failure, migration of the device, emergency surgery, myocardial infarction, stroke and death.     Based on both stroke and bleeding risk, a shared decision has been made in conjunction with Dr Lim patient's primary cardiologist and the patient using cardio-smart decision tool from [facility] to pursue closure of left atrial

## 2024-07-16 RX ORDER — RIVAROXABAN 20 MG/1
TABLET, FILM COATED ORAL
Qty: 90 TABLET | Refills: 1 | Status: SHIPPED | OUTPATIENT
Start: 2024-07-16

## 2024-07-16 NOTE — TELEPHONE ENCOUNTER
Cardiologist: Dr. Lim    Future Appointments   Date Time Provider Department Center   8/27/2024 10:00 AM [facility]   9/24/2024 10:10 AM Aissatou Ocampo APRN - NP [facility]   10/11/2024 10:20 AM Zechariah Lim MD [facility] AMB   6/17/2025 10:00 AM Florian Lees MD [facility] AMB   6/30/2025  9:00 AM Florian Lees MD [facility] AMB       Requested Prescriptions     Signed Prescriptions Disp Refills    rivaroxaban (XARELTO) 20 MG TABS tablet 90 tablet 1     Sig: TAKE 1 TABLET BY MOUTH ONCE DAILY WITH SUPPER     Authorizing Provider: ZECHARIAH LIM     Ordering User: GILLES PRATT         Refdenia VO per Dr. Lim.

## 2024-08-22 ENCOUNTER — PREP FOR PROCEDURE (OUTPATIENT)
Age: 74
End: 2024-08-22

## 2024-08-22 DIAGNOSIS — I47.19 ATRIAL TACHYCARDIA (HCC): ICD-10-CM

## 2024-08-22 LAB
ALBUMIN SERPL-MCNC: 4.1 G/DL (ref 3.5–5)
ALBUMIN/GLOB SERPL: 1.6 (ref 1.1–2.2)
ALP SERPL-CCNC: 72 U/L (ref 45–117)
ALT SERPL-CCNC: 27 U/L (ref 12–78)
ANION GAP SERPL CALC-SCNC: 4 MMOL/L (ref 5–15)
AST SERPL-CCNC: 27 U/L (ref 15–37)
BASOPHILS # BLD: 0.1 K/UL (ref 0–0.1)
BASOPHILS NFR BLD: 1 % (ref 0–1)
BILIRUB SERPL-MCNC: 0.7 MG/DL (ref 0.2–1)
BUN SERPL-MCNC: 16 MG/DL (ref 6–20)
BUN/CREAT SERPL: 16 (ref 12–20)
CALCIUM SERPL-MCNC: 9.6 MG/DL (ref 8.5–10.1)
CHLORIDE SERPL-SCNC: 104 MMOL/L (ref 97–108)
CO2 SERPL-SCNC: 28 MMOL/L (ref 21–32)
CREAT SERPL-MCNC: 0.97 MG/DL (ref 0.7–1.3)
DIFFERENTIAL METHOD BLD: NORMAL
EOSINOPHIL # BLD: 0.1 K/UL (ref 0–0.4)
EOSINOPHIL NFR BLD: 1 % (ref 0–7)
ERYTHROCYTE [DISTWIDTH] IN BLOOD BY AUTOMATED COUNT: 14.1 % (ref 11.5–14.5)
GLOBULIN SER CALC-MCNC: 2.5 G/DL (ref 2–4)
GLUCOSE SERPL-MCNC: 92 MG/DL (ref 65–100)
HCT VFR BLD AUTO: 44 % (ref 36.6–50.3)
HGB BLD-MCNC: 14.8 G/DL (ref 12.1–17)
IMM GRANULOCYTES # BLD AUTO: 0 K/UL (ref 0–0.04)
IMM GRANULOCYTES NFR BLD AUTO: 0 % (ref 0–0.5)
LYMPHOCYTES # BLD: 1.5 K/UL (ref 0.8–3.5)
LYMPHOCYTES NFR BLD: 19 % (ref 12–49)
MCH RBC QN AUTO: 29.1 PG (ref 26–34)
MCHC RBC AUTO-ENTMCNC: 33.6 G/DL (ref 30–36.5)
MCV RBC AUTO: 86.6 FL (ref 80–99)
MONOCYTES # BLD: 0.7 K/UL (ref 0–1)
MONOCYTES NFR BLD: 9 % (ref 5–13)
NEUTS SEG # BLD: 5.2 K/UL (ref 1.8–8)
NEUTS SEG NFR BLD: 70 % (ref 32–75)
NRBC # BLD: 0 K/UL (ref 0–0.01)
NRBC BLD-RTO: 0 PER 100 WBC
PLATELET # BLD AUTO: 338 K/UL (ref 150–400)
PMV BLD AUTO: 11.5 FL (ref 8.9–12.9)
POTASSIUM SERPL-SCNC: 4.2 MMOL/L (ref 3.5–5.1)
PROT SERPL-MCNC: 6.6 G/DL (ref 6.4–8.2)
RBC # BLD AUTO: 5.08 M/UL (ref 4.1–5.7)
SODIUM SERPL-SCNC: 136 MMOL/L (ref 136–145)
WBC # BLD AUTO: 7.5 K/UL (ref 4.1–11.1)

## 2024-08-22 RX ORDER — SODIUM CHLORIDE 9 MG/ML
INJECTION, SOLUTION INTRAVENOUS CONTINUOUS
Status: CANCELLED | OUTPATIENT
Start: 2024-08-22

## 2024-08-22 RX ORDER — SODIUM CHLORIDE 0.9 % (FLUSH) 0.9 %
5-40 SYRINGE (ML) INJECTION EVERY 12 HOURS SCHEDULED
Status: CANCELLED | OUTPATIENT
Start: 2024-08-22

## 2024-08-27 ENCOUNTER — ANESTHESIA (OUTPATIENT)
Facility: HOSPITAL | Age: 74
DRG: 274 | End: 2024-08-27
Payer: MEDICARE

## 2024-08-27 ENCOUNTER — APPOINTMENT (OUTPATIENT)
Facility: HOSPITAL | Age: 74
DRG: 274 | End: 2024-08-27
Attending: INTERNAL MEDICINE
Payer: MEDICARE

## 2024-08-27 ENCOUNTER — HOSPITAL ENCOUNTER (INPATIENT)
Facility: HOSPITAL | Age: 74
LOS: 1 days | Discharge: HOME | DRG: 274 | End: 2024-08-27
Attending: INTERNAL MEDICINE | Admitting: INTERNAL MEDICINE
Payer: MEDICARE

## 2024-08-27 ENCOUNTER — HOSPITAL ENCOUNTER (OUTPATIENT)
Facility: HOSPITAL | Age: 74
Discharge: HOME | DRG: 274 | End: 2024-08-29
Attending: INTERNAL MEDICINE
Payer: MEDICARE

## 2024-08-27 ENCOUNTER — ANESTHESIA EVENT (OUTPATIENT)
Facility: HOSPITAL | Age: 74
DRG: 274 | End: 2024-08-27
Payer: MEDICARE

## 2024-08-27 VITALS
BODY MASS INDEX: 28.93 KG/M2 | OXYGEN SATURATION: 94 % | HEIGHT: 66 IN | DIASTOLIC BLOOD PRESSURE: 73 MMHG | HEART RATE: 58 BPM | TEMPERATURE: 98 F | RESPIRATION RATE: 23 BRPM | WEIGHT: 180 LBS | SYSTOLIC BLOOD PRESSURE: 107 MMHG

## 2024-08-27 DIAGNOSIS — I48.91 ATRIAL FIBRILLATION, UNSPECIFIED TYPE (HCC): ICD-10-CM

## 2024-08-27 DIAGNOSIS — I48.91 ATRIAL FIBRILLATION (HCC): ICD-10-CM

## 2024-08-27 DIAGNOSIS — Z95.818 PRESENCE OF WATCHMAN LEFT ATRIAL APPENDAGE CLOSURE DEVICE: Primary | ICD-10-CM

## 2024-08-27 LAB
ABO + RH BLD: NORMAL
ACT BLD: 220 SECS (ref 79–138)
ACT BLD: 238 SECS (ref 79–138)
BLOOD GROUP ANTIBODIES SERPL: NORMAL
ECHO BSA: 1.95 M2
ECHO LV EF PHYSICIAN: 60 %
ECHO LV FRACTIONAL SHORTENING: 30 % (ref 28–44)
ECHO LV INTERNAL DIMENSION DIASTOLE INDEX: 1.73 CM/M2
ECHO LV INTERNAL DIMENSION DIASTOLIC: 3.3 CM (ref 4.2–5.9)
ECHO LV INTERNAL DIMENSION SYSTOLIC INDEX: 1.2 CM/M2
ECHO LV INTERNAL DIMENSION SYSTOLIC: 2.3 CM
SPECIMEN EXP DATE BLD: NORMAL

## 2024-08-27 PROCEDURE — 76937 US GUIDE VASCULAR ACCESS: CPT | Performed by: INTERNAL MEDICINE

## 2024-08-27 PROCEDURE — 86900 BLOOD TYPING SEROLOGIC ABO: CPT

## 2024-08-27 PROCEDURE — 2709999900 HC NON-CHARGEABLE SUPPLY: Performed by: INTERNAL MEDICINE

## 2024-08-27 PROCEDURE — C1759 CATH, INTRA ECHOCARDIOGRAPHY: HCPCS | Performed by: INTERNAL MEDICINE

## 2024-08-27 PROCEDURE — 3700000001 HC ADD 15 MINUTES (ANESTHESIA): Performed by: INTERNAL MEDICINE

## 2024-08-27 PROCEDURE — 3700000000 HC ANESTHESIA ATTENDED CARE: Performed by: INTERNAL MEDICINE

## 2024-08-27 PROCEDURE — C1893 INTRO/SHEATH, FIXED,NON-PEEL: HCPCS | Performed by: INTERNAL MEDICINE

## 2024-08-27 PROCEDURE — 93308 TTE F-UP OR LMTD: CPT | Performed by: INTERNAL MEDICINE

## 2024-08-27 PROCEDURE — 6360000004 HC RX CONTRAST MEDICATION: Performed by: INTERNAL MEDICINE

## 2024-08-27 PROCEDURE — 36415 COLL VENOUS BLD VENIPUNCTURE: CPT

## 2024-08-27 PROCEDURE — 1100000000 HC RM PRIVATE

## 2024-08-27 PROCEDURE — C1760 CLOSURE DEV, VASC: HCPCS | Performed by: INTERNAL MEDICINE

## 2024-08-27 PROCEDURE — 85347 COAGULATION TIME ACTIVATED: CPT

## 2024-08-27 PROCEDURE — C1889 IMPLANT/INSERT DEVICE, NOC: HCPCS | Performed by: INTERNAL MEDICINE

## 2024-08-27 PROCEDURE — 6360000002 HC RX W HCPCS

## 2024-08-27 PROCEDURE — 93355 ECHO TRANSESOPHAGEAL (TEE): CPT

## 2024-08-27 PROCEDURE — 33340 PERQ CLSR TCAT L ATR APNDGE: CPT | Performed by: INTERNAL MEDICINE

## 2024-08-27 PROCEDURE — 86901 BLOOD TYPING SEROLOGIC RH(D): CPT

## 2024-08-27 PROCEDURE — 2580000003 HC RX 258: Performed by: NURSE PRACTITIONER

## 2024-08-27 PROCEDURE — 93662 INTRACARDIAC ECG (ICE): CPT | Performed by: INTERNAL MEDICINE

## 2024-08-27 PROCEDURE — 2580000003 HC RX 258

## 2024-08-27 PROCEDURE — 2500000003 HC RX 250 WO HCPCS

## 2024-08-27 PROCEDURE — 86850 RBC ANTIBODY SCREEN: CPT

## 2024-08-27 PROCEDURE — 02L73DK OCCLUSION OF LEFT ATRIAL APPENDAGE WITH INTRALUMINAL DEVICE, PERCUTANEOUS APPROACH: ICD-10-PCS | Performed by: INTERNAL MEDICINE

## 2024-08-27 PROCEDURE — B24BZZ4 ULTRASONOGRAPHY OF HEART WITH AORTA, TRANSESOPHAGEAL: ICD-10-PCS | Performed by: INTERNAL MEDICINE

## 2024-08-27 PROCEDURE — C1894 INTRO/SHEATH, NON-LASER: HCPCS | Performed by: INTERNAL MEDICINE

## 2024-08-27 PROCEDURE — 93308 TTE F-UP OR LMTD: CPT

## 2024-08-27 DEVICE — LEFT ATRIAL APPENDAGE CLOSURE DEVICE WITH DELIVERY SYSTEM
Type: IMPLANTABLE DEVICE | Status: FUNCTIONAL
Brand: WATCHMAN FLX™ PRO

## 2024-08-27 RX ORDER — GLYCOPYRROLATE 0.2 MG/ML
INJECTION, SOLUTION INTRAMUSCULAR; INTRAVENOUS PRN
Status: DISCONTINUED | OUTPATIENT
Start: 2024-08-27 | End: 2024-08-27 | Stop reason: SDUPTHER

## 2024-08-27 RX ORDER — PROPOFOL 10 MG/ML
INJECTION, EMULSION INTRAVENOUS PRN
Status: DISCONTINUED | OUTPATIENT
Start: 2024-08-27 | End: 2024-08-27 | Stop reason: SDUPTHER

## 2024-08-27 RX ORDER — LIDOCAINE HYDROCHLORIDE 20 MG/ML
INJECTION, SOLUTION EPIDURAL; INFILTRATION; INTRACAUDAL; PERINEURAL PRN
Status: DISCONTINUED | OUTPATIENT
Start: 2024-08-27 | End: 2024-08-27 | Stop reason: SDUPTHER

## 2024-08-27 RX ORDER — ACETAMINOPHEN 325 MG/1
650 TABLET ORAL EVERY 4 HOURS PRN
Status: DISCONTINUED | OUTPATIENT
Start: 2024-08-27 | End: 2024-08-27 | Stop reason: HOSPADM

## 2024-08-27 RX ORDER — SODIUM CHLORIDE 9 MG/ML
INJECTION, SOLUTION INTRAVENOUS CONTINUOUS
Status: DISCONTINUED | OUTPATIENT
Start: 2024-08-27 | End: 2024-08-27 | Stop reason: HOSPADM

## 2024-08-27 RX ORDER — HEPARIN SODIUM 1000 [USP'U]/ML
INJECTION, SOLUTION INTRAVENOUS; SUBCUTANEOUS PRN
Status: DISCONTINUED | OUTPATIENT
Start: 2024-08-27 | End: 2024-08-27 | Stop reason: SDUPTHER

## 2024-08-27 RX ORDER — SODIUM CHLORIDE 0.9 % (FLUSH) 0.9 %
5-40 SYRINGE (ML) INJECTION PRN
Status: DISCONTINUED | OUTPATIENT
Start: 2024-08-27 | End: 2024-08-27 | Stop reason: HOSPADM

## 2024-08-27 RX ORDER — DEXAMETHASONE SODIUM PHOSPHATE 4 MG/ML
INJECTION, SOLUTION INTRA-ARTICULAR; INTRALESIONAL; INTRAMUSCULAR; INTRAVENOUS; SOFT TISSUE PRN
Status: DISCONTINUED | OUTPATIENT
Start: 2024-08-27 | End: 2024-08-27 | Stop reason: SDUPTHER

## 2024-08-27 RX ORDER — ROCURONIUM BROMIDE 10 MG/ML
INJECTION, SOLUTION INTRAVENOUS PRN
Status: DISCONTINUED | OUTPATIENT
Start: 2024-08-27 | End: 2024-08-27 | Stop reason: SDUPTHER

## 2024-08-27 RX ORDER — SODIUM CHLORIDE 0.9 % (FLUSH) 0.9 %
5-40 SYRINGE (ML) INJECTION EVERY 12 HOURS SCHEDULED
Status: DISCONTINUED | OUTPATIENT
Start: 2024-08-27 | End: 2024-08-27 | Stop reason: HOSPADM

## 2024-08-27 RX ORDER — IOPAMIDOL 755 MG/ML
INJECTION, SOLUTION INTRAVASCULAR PRN
Status: DISCONTINUED | OUTPATIENT
Start: 2024-08-27 | End: 2024-08-27 | Stop reason: HOSPADM

## 2024-08-27 RX ORDER — SODIUM CHLORIDE 9 MG/ML
INJECTION, SOLUTION INTRAVENOUS PRN
Status: DISCONTINUED | OUTPATIENT
Start: 2024-08-27 | End: 2024-08-27 | Stop reason: HOSPADM

## 2024-08-27 RX ORDER — PHENYLEPHRINE HCL IN 0.9% NACL 0.4MG/10ML
SYRINGE (ML) INTRAVENOUS PRN
Status: DISCONTINUED | OUTPATIENT
Start: 2024-08-27 | End: 2024-08-27 | Stop reason: SDUPTHER

## 2024-08-27 RX ORDER — ONDANSETRON 2 MG/ML
INJECTION INTRAMUSCULAR; INTRAVENOUS PRN
Status: DISCONTINUED | OUTPATIENT
Start: 2024-08-27 | End: 2024-08-27 | Stop reason: SDUPTHER

## 2024-08-27 RX ORDER — EPHEDRINE SULFATE 50 MG/ML
INJECTION INTRAVENOUS PRN
Status: DISCONTINUED | OUTPATIENT
Start: 2024-08-27 | End: 2024-08-27 | Stop reason: SDUPTHER

## 2024-08-27 RX ORDER — SUCCINYLCHOLINE/SOD CL,ISO/PF 200MG/10ML
SYRINGE (ML) INTRAVENOUS PRN
Status: DISCONTINUED | OUTPATIENT
Start: 2024-08-27 | End: 2024-08-27 | Stop reason: SDUPTHER

## 2024-08-27 RX ADMIN — HEPARIN SODIUM 4000 UNITS: 1000 INJECTION INTRAVENOUS; SUBCUTANEOUS at 11:05

## 2024-08-27 RX ADMIN — DEXAMETHASONE SODIUM PHOSPHATE 4 MG: 4 INJECTION INTRA-ARTICULAR; INTRALESIONAL; INTRAMUSCULAR; INTRAVENOUS; SOFT TISSUE at 11:03

## 2024-08-27 RX ADMIN — LIDOCAINE HYDROCHLORIDE 60 MG: 20 INJECTION, SOLUTION EPIDURAL; INFILTRATION; INTRACAUDAL; PERINEURAL at 10:32

## 2024-08-27 RX ADMIN — SODIUM CHLORIDE 2000 MG: 900 INJECTION INTRAVENOUS at 10:39

## 2024-08-27 RX ADMIN — ROCURONIUM BROMIDE 10 MG: 10 INJECTION, SOLUTION INTRAVENOUS at 11:37

## 2024-08-27 RX ADMIN — SODIUM CHLORIDE: 9 INJECTION, SOLUTION INTRAVENOUS at 10:31

## 2024-08-27 RX ADMIN — ROCURONIUM BROMIDE 30 MG: 10 INJECTION, SOLUTION INTRAVENOUS at 10:31

## 2024-08-27 RX ADMIN — GLYCOPYRROLATE 0.2 MG: 0.2 INJECTION, SOLUTION INTRAMUSCULAR; INTRAVENOUS at 10:54

## 2024-08-27 RX ADMIN — EPHEDRINE SULFATE 5 MG: 50 INJECTION INTRAVENOUS at 11:00

## 2024-08-27 RX ADMIN — SUGAMMADEX 200 MG: 100 INJECTION, SOLUTION INTRAVENOUS at 12:02

## 2024-08-27 RX ADMIN — ONDANSETRON 4 MG: 2 INJECTION INTRAMUSCULAR; INTRAVENOUS at 11:03

## 2024-08-27 RX ADMIN — HEPARIN SODIUM 4000 UNITS: 1000 INJECTION INTRAVENOUS; SUBCUTANEOUS at 11:22

## 2024-08-27 RX ADMIN — PHENYLEPHRINE HYDROCHLORIDE 40 MCG/MIN: 10 INJECTION INTRAVENOUS at 10:32

## 2024-08-27 RX ADMIN — Medication 160 MG: at 10:31

## 2024-08-27 RX ADMIN — HEPARIN SODIUM 3000 UNITS: 1000 INJECTION INTRAVENOUS; SUBCUTANEOUS at 11:34

## 2024-08-27 RX ADMIN — Medication 100 MCG: at 10:36

## 2024-08-27 RX ADMIN — PROPOFOL 150 MG: 10 INJECTION, EMULSION INTRAVENOUS at 10:31

## 2024-08-27 RX ADMIN — ROCURONIUM BROMIDE 10 MG: 10 INJECTION, SOLUTION INTRAVENOUS at 11:22

## 2024-08-27 ASSESSMENT — PAIN - FUNCTIONAL ASSESSMENT
PAIN_FUNCTIONAL_ASSESSMENT: NONE - DENIES PAIN

## 2024-08-27 NOTE — H&P
BP: 138/83   Pulse: 91   Resp: 17   Temp: 98.3 °F (36.8 °C)   TempSrc: Oral   SpO2: 99%   Weight: 81.6 kg (180 lb)   Height: 1.676 m (5' 6\")      General:    Alert, cooperative, no distress.   Psychiatric:    Normal Mood and affect    Eye/ENT:      Pupils equal, No asymmetry, Conjunctival pink. Able to hear voice at normal amplitude   Lungs:      Visibly symmetric chest expansion, No palpable tenderness. Clear to auscultation bilaterally.    Heart::    Regular rate and rhythm, S1, S2 normal, No murmur. No click, rub or gallop.No JVD, Normal palpable peripheral pulses. No cyanosis   Abdomen:     Soft, non-tender. Bowel sounds normal. No masses,  No      organomegaly.   Extremities:   Extremities normal, atraumatic, minimal edema.   Neurologic:   CN II-XII grossly intact. No gross focal deficits               Recent Labs:  Lab Results   Component Value Date/Time     08/22/2024 11:00 AM    K 4.2 08/22/2024 11:00 AM     08/22/2024 11:00 AM    CO2 28 08/22/2024 11:00 AM    BUN 16 08/22/2024 11:00 AM    CREATININE 0.97 08/22/2024 11:00 AM    GLUCOSE 92 08/22/2024 11:00 AM    CALCIUM 9.6 08/22/2024 11:00 AM    LABGLOM 81 08/22/2024 11:00 AM    LABGLOM >60 12/27/2023 09:49 AM      Lab Results   Component Value Date    WBC 7.5 08/22/2024    HGB 14.8 08/22/2024    HCT 44.0 08/22/2024    MCV 86.6 08/22/2024     08/22/2024     Lab Results   Component Value Date    CHOL 163 06/10/2024    CHOL 166 06/08/2023    CHOL 166 11/15/2021     Lab Results   Component Value Date    TRIG 60 06/10/2024    TRIG 55 06/08/2023    TRIG 81 11/15/2021     Lab Results   Component Value Date    HDL 67 06/10/2024    HDL 69 06/08/2023    HDL 66 11/15/2021     No components found for: \"LDLCHOLESTEROL\", \"LDLCALC\"  Lab Results   Component Value Date    VLDL 12 06/10/2024    VLDL 11 06/08/2023    VLDL 16.2 11/15/2021     Lab Results   Component Value Date    CHOLHDLRATIO 2.4 06/10/2024    CHOLHDLRATIO 2.4 06/08/2023    CHOLHDLRATIO  2.5 11/15/2021             Investigations reviewed  07/25/23    TRANSTHORACIC ECHOCARDIOGRAM (TTE) COMPLETE (CONTRAST/BUBBLE/3D PRN) 07/26/2023  4:15 PM (Final)    Interpretation Summary    Left Ventricle: Normal left ventricular systolic function with a visually estimated EF of 55 - 60%. Left ventricle size is normal. Normal wall thickness. Normal wall motion. Normal diastolic function.    Aortic Valve: Valve structure is normal. Mildly thickened cusp. Mildly calcified cusp. Mild regurgitation.    Mitral Valve: Mild regurgitation.    Tricuspid Valve: Mild regurgitation. Normal RVSP. The estimated RVSP is 28 mmHg.    Signed by: Zechariah Lim MD on 7/26/2023  4:15 PM    No results found for this or any previous visit.     ATTENTION:   This medical record was transcribed using an electronic medical records/speech recognition system.  Although proofread, it may and can contain electronic, spelling and other errors.  Corrections may be executed at a later time.  Please feel free to contact us for any clarifications as needed.    [facility] [place]  [facility], [place], [place]. [phone]

## 2024-08-27 NOTE — PROGRESS NOTES
TRANSFER - IN REPORT:    Verbal report received from Colt GRIMES on Ander Yepez  being received from Electrophysiology Lab for routine post-op. Report consisted of patient’s Situation, Background, Assessment and Recommendations(SBAR). Information from the following report(s) Kardex and Procedure Summary was reviewed with the receiving clinician. Opportunity for questions and clarification was provided. Assessment completed upon patient’s arrival to Cardiac Cath Lab RECOVERY AREA and care assumed.       Cardiac Cath Lab Recovery Arrival Note:    Ander Yepez arrived to CCL recovery area.  Patient procedure= Watchmen device. Patient on cardiac monitor, non-invasive blood pressure, SPO2 monitor. On  O2 @ 10 lpm via super Nova .  IV  of NS on pump at 25 ml/hr. Patient status doing well without problems. Patient is A&Ox 3. Patient reports no pain.    PROCEDURE SITE CHECK:    Procedure site:without any bleeding or hematoma, No pain/discomfort reported at procedure site.     No change in patient status. Continue to monitor patient and status.

## 2024-08-27 NOTE — DISCHARGE SUMMARY
[facility] CARDIOLOGY  [facility].  [phone]       Watchman Device Discharge Summary     Patient ID:  Ander Yepez  [id number]  74 y.o.  1950    Admit Date: 8/27/2024    Discharge Date: 8/27/2024     Admitting Physician: Flora Allen MD     Discharge Physician: Fatimah Bennett, MARIELA - AMIRAH/Dr. Allen     Admission Diagnoses:   Atrial fibrillation (HCC) [I48.91]  Presence of Watchman left atrial appendage closure device [Z95.818]  Atrial fibrillation    Discharge Diagnoses:   Principal Problem:    Atrial fibrillation (HCC)  Active Problems:    Presence of Watchman left atrial appendage closure device  Resolved Problems:    * No resolved hospital problems. 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 01/04/2024    Posterior capsular opacification visually significant of left eye 04/04/2022    Other male erectile dysfunction 06/10/2020    LUIS CARLOS (generalized anxiety disorder) 12/09/2019    Atrial tachycardia (HCC) 11/19/2018    Advance directive discussed with patient 11/10/2016    OA (osteoarthritis) 08/19/2011    HTN (hypertension) 02/01/2011      Past Medical History:   Diagnosis Date    A-fib (HCC)     Arrhythmia 11/2021    Cardioverted     LUIS CARLOS (generalized anxiety disorder) 12/9/2019    RESOLVED    Hypertension     OA (osteoarthritis) 8/19/2011    Sleep apnea       Social History     Socioeconomic History    Marital status:      Spouse name: None    Number of children: None    Years of education: None    Highest education level: None   Tobacco Use    Smoking status: Never     Passive exposure: Never

## 2024-08-27 NOTE — PROGRESS NOTES
1500 Echo obtained         1530 Patient up to bathroom bleeding noted ,pressure held . Patient back to bed new  dressing applied to right groin no hematoma or bleeding noted.      1600 Patient up to bathroom no bleeding . Dr. Allen made aware.    1630 wife called and given discharge instructions and patient dressed and ready for discharge.   1715 patient discharged via wheelchair to wife waiting at front door.

## 2024-08-27 NOTE — ANESTHESIA PROCEDURE NOTES
Procedure Performed: MARCUS       Start Time:        End Time:      Preanesthesia Checklist:  Patient identified, IV assessed, risks and benefits discussed, monitors and equipment assessed, procedure being performed at surgeon's request and anesthesia consent obtained.    General Procedure Information  Diagnostic Indications for Echo:  assessment of surgical repair, hemodynamic monitoring and assessment of valve function  Location performed:  Cath lab  Intubated  Bite block not placed  Heart visualized  Probe Insertion:  Easy  Probe Type:  Mulitplane  Modalities:  2D, color flow mapping and pulse wave Doppler    Echocardiographic and Doppler Measurements    Ventricles    Right Ventricle:  Hypertrophy not present.  Thrombus not present.  Global function normal.    Left Ventricle:  Hypertrophy present.  Thrombus not present.  Global Function normal.      Ventricular Regional Function:  1- Basal Anteroseptal:  normal  2- Basal Anterior:  normal  3- Basal Anterolateral:  normal  4- Basal Inferolateral:  normal  5- Basal Inferior:  normal  6- Basal Inferoseptal:  normal  7- Mid Anteroseptal:  normal  8- Mid Anterior:  normal  9- Mid Anterolateral:  normal  10- Mid Inferolateral:  normal  11- Mid Inferior:  normal  12- Mid Inferoseptal:  normal  13- Apical Anterior:  normal  14- Apical Lateral:  normal  15- Apical Inferior:  normal  16- Apical Septal:  normal  17- Haviland:  normal      Valves    Aortic Valve:  Annulus normal.  Stenosis not present.  Regurgitation none.  Leaflets normal.  Leaflet motions normal.      Mitral Valve:  Annulus normal.  Stenosis not present.  Leaflet motions normal.      Tricuspid Valve:  Annulus normal.  Stenosis not present.  Leaflet motions normal.    Pulmonic Valve:  Annulus normal.  Stenosis not present.  Regurgitation none.    Other Valve Findings:       Trace TR and MR    Aorta    Ascending Aorta:  Size normal.  Diameter 3.4 cm.  Dissection not present.    Aortic Arch:  Size normal.  Diameter  2.6 cm.  Dissection not present.    Descending Aorta:  Size normal.  Diameter 2.6 cm.  Dissection not present.        Atria    Right Atrium:  Size normal.  Spontaneous echo contrast not present.  Thrombus not present.  Tumor not present.  Device not present.      Left Atrium:  Size normal.  Spontaneous echo contrast not present.  Thrombus not present.  Tumor not present.  Device not present.    Left atrial appendage normal.  Other Atria Findings:       BURT without thrombus, maximum annulus 2.1 cm, measurements viewed at 0, 45, 90 and 135 degrees.  Results documented by rep.      Septa    Atrial Septum:  Intra-atrial septal morphology normal.      Ventricular Septum:  Intra-ventricular septum morphology normal.          Other Findings  Pericardium:  normal  Pleural Effusion:  none  Pulmonary Venous Flow:  normal    Anesthesia Information  Performed Personally      Echocardiogram Comments:       MARCUS facilitated septal puncture and Watchman placement.  Post procedure device met criteria and no effusion and left to right low across septum.  All results discussed and viewed with cardiologist.  MARCUS removed without event. Post Intervention Follow-up Study  Aortic Function: unchangedMitral Function: unchangedTricuspid Function: unchangedNoneComments: MARCUS placed without event.

## 2024-08-27 NOTE — ANESTHESIA POSTPROCEDURE EVALUATION
Department of Anesthesiology  Postprocedure Note    Patient: Ander Yepez  MRN: [id number]  YOB: 1950  Date of evaluation: 8/27/2024    Procedure Summary       Date: 08/27/24 Room / Location: [facility] CATH LAB 3 / [facility] CARDIAC CATH LAB    Anesthesia Start: 1028 Anesthesia Stop: 1221    Procedures:       Watchman tyrone closure device      Tavon during cath case      Intracardiac echocardiogram      Ultrasound guided vascular access Diagnosis:       Longstanding persistent atrial fibrillation (HCC)      (Atrial fibrillation (HCC) [I48.91])    Providers: Flora Allen MD Responsible Provider: Josiah Wilson DO    Anesthesia Type: General ASA Status: 3            Anesthesia Type: General    Herman Phase I: Herman Score: 10    Herman Phase II:      Anesthesia Post Evaluation    Patient location during evaluation: PACU  Patient participation: complete - patient participated  Level of consciousness: awake and alert  Pain score: 0  Airway patency: patent  Nausea & Vomiting: no nausea  Cardiovascular status: hemodynamically stable  Respiratory status: acceptable  Hydration status: euvolemic  Comments: Seen, no complaints   Pain management: adequate    There were no known notable events for this encounter.

## 2024-08-27 NOTE — PROGRESS NOTES
EP/Cath LAB to Recovery Room Report    Procedure: Gertrude TELLES: JOSE ALFREDO Allen MD    Verbal Report given to Recovery Nurse on patient being transferred to Recovery Room for routine post-op. Patient stable upon transfer to .  Pt had general sedation with Anesthesia team, who managed MAR, vitals, and airway. Vitals, mental status, MAR, procedural summary discussed with recovery RN.     Right femoral vein 15 fr sheath removed, closed with perclose.1152  Left femoral vein 11fr sheath removed, closed with perclose.1200

## 2024-08-27 NOTE — ANESTHESIA PRE PROCEDURE
Department of Anesthesiology  Preprocedure Note       Name:  Ander Yepez   Age:  74 y.o.  :  1950                                          MRN:  [id number]         Date:  2024      Surgeon: Surgeon(s):  Flora Allen MD    Procedure: Procedure(s):  Watchman tyrone closure device  Tavon during cath case    Medications prior to admission:   Prior to Admission medications    Medication Sig Start Date End Date Taking? Authorizing Provider   amiodarone (PACERONE) 100 MG tablet Take 1/2 (one-half) tablet by mouth once daily 24  Yes Zechariah Lim MD   losartan (COZAAR) 50 MG tablet Take 1 tablet by mouth once daily 3/13/24  Yes Zechariah Lim MD   hydroCHLOROthiazide (HYDRODIURIL) 25 MG tablet Take 1 tablet by mouth every other day 23  Yes Florian Lees MD   Multiple Vitamins-Minerals (THERAPEUTIC MULTIVITAMIN-MINERALS) tablet Take 1 tablet by mouth daily   Yes ProviderJose MD   b complex vitamins capsule Take 1 capsule by mouth daily 23  Yes Florian Lees MD   rivaroxaban (XARELTO) 20 MG TABS tablet TAKE 1 TABLET BY MOUTH ONCE DAILY WITH SUPPER 24   Zechariah Lim MD   amoxicillin (AMOXIL) 500 MG tablet Take 2 tablets 1 hour before dental procedure and 2 tablets 1 hour after 24   Fe West MD   cetirizine (ZYRTEC) 10 MG tablet Take 1 tablet by mouth daily as needed 23   Automatic Reconciliation, Ar       Current medications:    Current Facility-Administered Medications   Medication Dose Route Frequency Provider Last Rate Last Admin   • sodium chloride flush 0.9 % injection 5-40 mL  5-40 mL IntraVENous 2 times per day Fatimah Bennett APRN - CNP       • 0.9 % sodium chloride infusion   IntraVENous Continuous Fatimah Bennett APRN - CNP           Allergies:  No Known Allergies    Problem List:    Patient Active Problem List   Diagnosis Code   • Other male erectile dysfunction N52.8   • Atrial tachycardia (HCC) I47.19   • HTN (hypertension) I10

## 2024-08-27 NOTE — PROGRESS NOTES
Cardiac Cath Lab Recovery Arrival Note:      Ander Yepez arrived to Cardiac Cath Lab, Recovery Area. Staff introduced to patient. Patient identifiers verified with NAME and DATE OF BIRTH. Procedure verified with patient. Consent forms reviewed and signed by patient or authorized representative and verified. Allergies verified. Patient informed of procedure and plan of care. Questions answered with review. Patient prepped for procedure, per orders from physician, prior to arrival.    Patient on cardiac monitor, non-invasive blood pressure, SPO2 monitor. Patient is A&Ox 4. Patient reports no complaints.     Patient in stretcher, in low position, with side rails up, call bell within reach, patient instructed to call of assistance as needed.    Patient prep in: [facility] Recovery Area, Bed# FT.   Family in: waiting room.   Prep by: Lisette Charlton

## 2024-08-27 NOTE — DISCHARGE INSTRUCTIONS
Future Appointments   Date Time Provider Department Center   8/27/2024 10:00 AM [facility] CATH LAB 3 [facility]L [facility]   9/24/2024 10:10 AM Aissatou Ocampo APRN - ÁLVARO HWANG BS [facility]   10/11/2024 10:20 AM Zechariah Lim MD [facility] BS [facility]   6/17/2025 10:00 AM Florian Lees MD [facility] DEP   6/30/2025  9:00 AM Florian Lees MD WE[facility] DEP     The office  will call to arrange your POST WATCHMAN MARCUS, to be done in 6 weeks.

## 2024-08-27 NOTE — PROGRESS NOTES
Cardiac Cath Lab Procedure Area Arrival Note:    Ander Yepez arrived to Cardiac Cath Lab, Procedure Area. Patient identifiers verified with NAME and DATE OF BIRTH. Procedure verified with patient. Consent forms verified. Allergies verified. Patient informed of procedure and plan of care. Questions answered with review. Patient voiced understanding of procedure and plan of care.    Patient on cardiac monitor, non-invasive blood pressure, SPO2 monitor. On stretcher to room 3 for MARCUS and BURT closure device under general anesthesia. Patient status doing well without problems. Patient is A&Ox 4. Patient reports no complaints.     Patient medicated during procedure with orders obtained and verified by Dr. Allen.    Refer to patients Cardiac Cath Lab PROCEDURE REPORT for vital signs, assessment, status, and response during procedure, printed at end of case. Printed report on chart or scanned into chart.

## 2024-08-28 ENCOUNTER — TELEPHONE (OUTPATIENT)
Age: 74
End: 2024-08-28

## 2024-08-28 NOTE — TELEPHONE ENCOUNTER
Spoke to patient and scheduled him for post watchman MARCUS with Dr. Allen on 10/7 @ 8:30 am with 7:00 am arrival. Instructions sent to patient via my chart and advised no labs needed, no medications to hold. Patient verbalized understanding and had no questions at the time of call.    Patient identification verified x2.    Patient Instructions    MARCUS (Transesophageal Echocardiogram)  Cardioversion  Pre-procedure instructions  The night before the procedure nothing to eat or drink after midnight, you may take approved medications the morning of the procedure with a few sips of water.  Medication restrictions: No medications to hold.  Labs: No labs needed.  [facility] Draw Sites:  [facility] [place]: [address] [facility] [address]  [place]: [address] [address] [address]  [place]: [address] [facility] [address]  [facility]: [address] [facility] [address]  [place]: [address] [facility] [address]  [facility]: [address] [facility] [address]  [place]: [address] [facility] [address]  [facility]: [address]. [place] [address]    Procedure day  Have a  that will bring you and take you home  Have a responsible adult that can stay with you for 24hrs  Bring ID and insurance card  Wear comfortable clothing  Leave valuables at home, bring: dentures, hearing aids, or glasses  Bring overnight bag (just in case of an overnight stay)  Where to report  [facility]: go through main entrance and to the left is outpatient registration    Date of procedure: 10/7 w/ Dr. Allen  Arrival Time: 7:00 am    Post procedure instructions  No driving for 24 hours    Contact  [facility]                                                           [address] [place] [address]                                                         [place] office: [phone]                                  ----- Message from CORNELIO VINCENT RN sent at 8/27/2024  1:44 PM EDT -----  Tess-    CPT- 36447  ICD- I48.91; Z95.818  No meds to hold no labs to get    Thanks!  ----- Message -----  From: Fatimah Bennett, MARIELA - CNP  Sent: 8/27/2024   1:24 PM EDT  To: Tess Larkin MA; Shikha Sierra RN    S/p watchman today     Needs 6 week post watchman MARCUS please     Usha Sheridan

## 2024-08-30 ENCOUNTER — TELEPHONE (OUTPATIENT)
Age: 74
End: 2024-08-30

## 2024-08-30 NOTE — TELEPHONE ENCOUNTER
Care Transitions Initial Follow Up Call    Outreach made within 2 business days of discharge: Yes    Patient: Ander Yepez Patient : 1950   MRN: [id number]  Reason for Admission: Atrial Fib - Presence of Watchman left atrial appendage closure device.  Discharge Date: 24       Spoke with: patient    Discharge department/facility: [facility]    TCM Interactive Patient Contact:  Was patient able to fill all prescriptions: Yes  Was patient instructed to bring all medications to the follow-up visit: Yes  Is patient taking all medications as directed in the discharge summary? No new medications.  Does patient understand their discharge instructions: Yes  Does patient have questions or concerns that need addressed prior to 7-14 day follow up office visit: no    Additional needs identified to be addressed with provider  Transferred to [facility].             Scheduled appointment with PCP within 7-14 days    Follow Up  Future Appointments   Date Time Provider Department Center   2024 10:10 AM Aissatou Ocampo APRN - NP [facility] BS AMB   10/7/2024  8:30 AM [facility] STRESS ECHO LAB 1 [facility]   10/11/2024 10:20 AM Zechariah Lim MD C[facility]REY BS AMB   2025 10:00 AM Florian Lees MD [facility] DEP   2025  9:00 AM Florian Lees MD [facility] DEP       NAINA WRIGHT LPN

## 2024-09-03 RX ORDER — LOSARTAN POTASSIUM 50 MG/1
50 TABLET ORAL DAILY
Qty: 90 TABLET | Refills: 0 | Status: SHIPPED | OUTPATIENT
Start: 2024-09-03

## 2024-09-03 NOTE — TELEPHONE ENCOUNTER
Cardiologist: Dr. Lim    Future Appointments   Date Time Provider Department Center   9/5/2024  2:30 PM Florian Lees MD [facility] DEP   9/16/2024  9:20 AM Fe West MD [facility]   9/24/2024 10:10 AM Aissatou Ocampo APRN - NP [facility] BS [facility]   10/7/2024  8:30 AM [facility] STRESS ECHO LAB 1 [facility]   10/11/2024 10:20 AM Zechariah Lim MD [facility]   6/17/2025 10:00 AM Florian Lees MD [facility] DEP   6/30/2025  9:00 AM Florian Lees MD [facility] DEP       Requested Prescriptions     Signed Prescriptions Disp Refills    losartan (COZAAR) 50 MG tablet 90 tablet 0     Sig: Take 1 tablet by mouth once daily     Authorizing Provider: ZECHARIAH LIM     Ordering User: GILLES PRATT         Refills VO per Dr. Lim.

## 2024-09-05 ENCOUNTER — OFFICE VISIT (OUTPATIENT)
Age: 74
End: 2024-09-05

## 2024-09-05 VITALS
HEIGHT: 66 IN | BODY MASS INDEX: 29.02 KG/M2 | TEMPERATURE: 98.2 F | OXYGEN SATURATION: 97 % | WEIGHT: 180.6 LBS | HEART RATE: 66 BPM | RESPIRATION RATE: 15 BRPM | SYSTOLIC BLOOD PRESSURE: 123 MMHG | DIASTOLIC BLOOD PRESSURE: 79 MMHG

## 2024-09-05 DIAGNOSIS — Z09 HOSPITAL DISCHARGE FOLLOW-UP: ICD-10-CM

## 2024-09-05 DIAGNOSIS — S73.005D DISLOCATION OF LEFT HIP, SUBSEQUENT ENCOUNTER: ICD-10-CM

## 2024-09-05 DIAGNOSIS — I47.19 ATRIAL TACHYCARDIA (HCC): Primary | ICD-10-CM

## 2024-09-05 DIAGNOSIS — G43.109 OCULAR MIGRAINE: ICD-10-CM

## 2024-09-05 DIAGNOSIS — I10 PRIMARY HYPERTENSION: ICD-10-CM

## 2024-09-05 NOTE — PROGRESS NOTES
DAILY WITH SUPPER 90 tablet 1    amoxicillin (AMOXIL) 500 MG tablet Take 2 tablets 1 hour before dental procedure and 2 tablets 1 hour after 4 tablet 4    amiodarone (PACERONE) 100 MG tablet Take 1/2 (one-half) tablet by mouth once daily 45 tablet 1    hydroCHLOROthiazide (HYDRODIURIL) 25 MG tablet Take 1 tablet by mouth every other day      Multiple Vitamins-Minerals (THERAPEUTIC MULTIVITAMIN-MINERALS) tablet Take 1 tablet by mouth daily      b complex vitamins capsule Take 1 capsule by mouth daily 30 capsule 3    cetirizine (ZYRTEC) 10 MG tablet Take 1 tablet by mouth daily as needed          Medications patient taking as of now reconciled against medications ordered at time of hospital discharge: Yes    A comprehensive review of systems was negative except for what was noted in the HPI.    Objective:    /79   Pulse 66   Temp 98.2 °F (36.8 °C)   Resp 15   Ht 1.676 m (5' 6\")   Wt 81.9 kg (180 lb 9.6 oz)   SpO2 97%   BMI 29.15 kg/m²   General Appearance: alert and oriented to person, place and time, well-developed and well-nourished, in no acute distress  Neck: neck supple and non tender without mass, no thyromegaly or thyroid nodules, no cervical lymphadenopathy   Pulmonary/Chest: clear to auscultation bilaterally- no wheezes, rales or rhonchi, normal air movement, no respiratory distress  Cardiovascular: normal rate, normal S1 and S2, no gallops, intact distal pulses, and no carotid bruits  Abdomen: soft, non-tender, non-distended, normal bowel sounds, no masses or organomegaly  Extremities: no cyanosis, no clubbing, and there is mild bruising in the right groin.  There is a small nodularity along the femoral artery without tenderness or bruit.  No fluctuance or erythema.  Musculoskeletal: normal range of motion, no joint swelling, deformity or tenderness  Neurologic: gait and coordination normal and speech normal      An electronic signature was used to authenticate this note.  --Florian Lees,

## 2024-09-23 ASSESSMENT — SLEEP AND FATIGUE QUESTIONNAIRES
FOSQ SCORE: 20
HOW LIKELY ARE YOU TO NOD OFF OR FALL ASLEEP WHILE SITTING AND TALKING TO SOMEONE: WOULD NEVER DOZE
HOW LIKELY ARE YOU TO NOD OFF OR FALL ASLEEP WHEN YOU ARE A PASSENGER IN A CAR FOR AN HOUR WITHOUT A BREAK: WOULD NEVER DOZE
DO YOU HAVE DIFFICULTY WATCHING A MOVIE OR VIDEO BECAUSE YOU BECOME SLEEPY OR TIRED: NO
DO YOU HAVE DIFFICULTY VISITING YOUR FAMILY OR FRIENDS IN THEIR HOME BECAUSE YOU BECOME SLEEPY OR TIRED: NO
HOW LIKELY ARE YOU TO NOD OFF OR FALL ASLEEP WHILE LYING DOWN TO REST IN THE AFTERNOON WHEN CIRCUMSTANCES PERMIT: MODERATE CHANCE OF DOZING
HOW LIKELY ARE YOU TO NOD OFF OR FALL ASLEEP WHILE LYING DOWN TO REST IN THE AFTERNOON WHEN CIRCUMSTANCES PERMIT: MODERATE CHANCE OF DOZING
HAS YOUR MOOD BEEN AFFECTED BECAUSE YOU ARE SLEEPY OR TIRED: NO
DO YOU HAVE DIFFICULTY CONCENTRATING ON THE THINGS YOU DO BECAUSE YOU ARE SLEEPY OR TIRED: NO
DO YOU HAVE DIFFICULTY OPERATING A MOTOR VEHICLE FOR SHORT DISTANCES (LESS THAN 100 MILES) BECAUSE YOU BECOME SLEEPY: NO
HOW LIKELY ARE YOU TO NOD OFF OR FALL ASLEEP IN A CAR, WHILE STOPPED FOR A FEW MINUTES IN TRAFFIC: WOULD NEVER DOZE
HOW LIKELY ARE YOU TO NOD OFF OR FALL ASLEEP IN A CAR, WHILE STOPPED FOR A FEW MINUTES IN TRAFFIC: WOULD NEVER DOZE
HOW LIKELY ARE YOU TO NOD OFF OR FALL ASLEEP WHEN YOU ARE A PASSENGER IN A CAR FOR AN HOUR WITHOUT A BREAK: WOULD NEVER DOZE
DO YOU GENERALLY HAVE DIFFICULTY REMEMBERING THINGS BECAUSE YOU ARE SLEEPY OR TIRED: NO
ESS TOTAL SCORE: 5
HOW LIKELY ARE YOU TO NOD OFF OR FALL ASLEEP WHILE SITTING QUIETLY AFTER LUNCH WITHOUT ALCOHOL: WOULD NEVER DOZE
DO YOU HAVE DIFFICULTY OPERATING A MOTOR VEHICLE FOR LONG DISTANCES (GREATER THAN 100 MILES) BECAUSE YOU BECOME SLEEPY: NO
HOW LIKELY ARE YOU TO NOD OFF OR FALL ASLEEP WHILE SITTING INACTIVE IN A PUBLIC PLACE: WOULD NEVER DOZE
HOW LIKELY ARE YOU TO NOD OFF OR FALL ASLEEP WHILE SITTING AND TALKING TO SOMEONE: WOULD NEVER DOZE
HOW LIKELY ARE YOU TO NOD OFF OR FALL ASLEEP WHILE SITTING AND READING: SLIGHT CHANCE OF DOZING
HOW LIKELY ARE YOU TO NOD OFF OR FALL ASLEEP WHILE WATCHING TV: MODERATE CHANCE OF DOZING
HAS YOUR RELATIONSHIP WITH FAMILY, FRIENDS OR WORK COLLEAGUES BEEN AFFECTED BECAUSE YOU ARE SLEEPY OR TIRED: NO
HOW LIKELY ARE YOU TO NOD OFF OR FALL ASLEEP WHILE SITTING QUIETLY AFTER LUNCH WITHOUT ALCOHOL: WOULD NEVER DOZE
DO YOU HAVE DIFFICULTY BEING AS ACTIVE AS YOU WANT TO BE IN THE MORNING BECAUSE YOU ARE SLEEPY OR TIRED: NO
HOW LIKELY ARE YOU TO NOD OFF OR FALL ASLEEP WHILE SITTING INACTIVE IN A PUBLIC PLACE: WOULD NEVER DOZE
HOW LIKELY ARE YOU TO NOD OFF OR FALL ASLEEP WHILE SITTING AND READING: SLIGHT CHANCE OF DOZING
HOW LIKELY ARE YOU TO NOD OFF OR FALL ASLEEP WHILE WATCHING TV: MODERATE CHANCE OF DOZING
DO YOU HAVE DIFFICULTY BEING AS ACTIVE AS YOU WANT TO BE IN THE EVENING BECAUSE YOU ARE SLEEPY OR TIRED: NO

## 2024-09-24 ENCOUNTER — CLINICAL DOCUMENTATION (OUTPATIENT)
Age: 74
End: 2024-09-24

## 2024-09-24 ENCOUNTER — TELEMEDICINE (OUTPATIENT)
Age: 74
End: 2024-09-24
Payer: MEDICARE

## 2024-09-24 DIAGNOSIS — I10 PRIMARY HYPERTENSION: ICD-10-CM

## 2024-09-24 DIAGNOSIS — I48.91 ATRIAL FIBRILLATION, UNSPECIFIED TYPE (HCC): ICD-10-CM

## 2024-09-24 DIAGNOSIS — G47.33 OSA (OBSTRUCTIVE SLEEP APNEA): Primary | ICD-10-CM

## 2024-09-24 PROCEDURE — G8427 DOCREV CUR MEDS BY ELIG CLIN: HCPCS | Performed by: NURSE PRACTITIONER

## 2024-09-24 PROCEDURE — 99213 OFFICE O/P EST LOW 20 MIN: CPT | Performed by: NURSE PRACTITIONER

## 2024-09-24 PROCEDURE — G8417 CALC BMI ABV UP PARAM F/U: HCPCS | Performed by: NURSE PRACTITIONER

## 2024-09-24 PROCEDURE — 1111F DSCHRG MED/CURRENT MED MERGE: CPT | Performed by: NURSE PRACTITIONER

## 2024-09-24 PROCEDURE — 1036F TOBACCO NON-USER: CPT | Performed by: NURSE PRACTITIONER

## 2024-09-24 PROCEDURE — 1123F ACP DISCUSS/DSCN MKR DOCD: CPT | Performed by: NURSE PRACTITIONER

## 2024-10-07 ENCOUNTER — ANESTHESIA EVENT (OUTPATIENT)
Facility: HOSPITAL | Age: 74
End: 2024-10-07
Payer: MEDICARE

## 2024-10-07 ENCOUNTER — HOSPITAL ENCOUNTER (OUTPATIENT)
Facility: HOSPITAL | Age: 74
Discharge: HOME | End: 2024-10-09
Attending: INTERNAL MEDICINE
Payer: MEDICARE

## 2024-10-07 ENCOUNTER — ANESTHESIA (OUTPATIENT)
Facility: HOSPITAL | Age: 74
End: 2024-10-07
Payer: MEDICARE

## 2024-10-07 VITALS
RESPIRATION RATE: 15 BRPM | DIASTOLIC BLOOD PRESSURE: 73 MMHG | TEMPERATURE: 98.1 F | OXYGEN SATURATION: 96 % | SYSTOLIC BLOOD PRESSURE: 115 MMHG | HEART RATE: 46 BPM | BODY MASS INDEX: 29.05 KG/M2 | WEIGHT: 180 LBS

## 2024-10-07 DIAGNOSIS — I48.91 FLUTTER-FIBRILLATION (HCC): ICD-10-CM

## 2024-10-07 DIAGNOSIS — I48.92 FLUTTER-FIBRILLATION (HCC): ICD-10-CM

## 2024-10-07 DIAGNOSIS — Z95.818 STATUS POST BLALOCK-TAUSSIG SHUNT: ICD-10-CM

## 2024-10-07 PROCEDURE — 3700000001 HC ADD 15 MINUTES (ANESTHESIA)

## 2024-10-07 PROCEDURE — 3700000000 HC ANESTHESIA ATTENDED CARE

## 2024-10-07 PROCEDURE — 6360000002 HC RX W HCPCS

## 2024-10-07 PROCEDURE — 2500000003 HC RX 250 WO HCPCS

## 2024-10-07 PROCEDURE — 93319 3D ECHO IMG CGEN CAR ANOMAL: CPT

## 2024-10-07 PROCEDURE — 93312 ECHO TRANSESOPHAGEAL: CPT

## 2024-10-07 PROCEDURE — 2580000003 HC RX 258

## 2024-10-07 RX ORDER — LIDOCAINE HYDROCHLORIDE 20 MG/ML
15 SOLUTION OROPHARYNGEAL
Status: DISCONTINUED | OUTPATIENT
Start: 2024-10-07 | End: 2024-10-10 | Stop reason: HOSPADM

## 2024-10-07 RX ORDER — CLOPIDOGREL BISULFATE 75 MG/1
75 TABLET ORAL DAILY
Qty: 30 TABLET | Refills: 3 | Status: SHIPPED | OUTPATIENT
Start: 2024-10-07 | End: 2025-02-04

## 2024-10-07 RX ORDER — LIDOCAINE HYDROCHLORIDE 20 MG/ML
INJECTION, SOLUTION EPIDURAL; INFILTRATION; INTRACAUDAL; PERINEURAL
Status: DISCONTINUED | OUTPATIENT
Start: 2024-10-07 | End: 2024-10-07 | Stop reason: SDUPTHER

## 2024-10-07 RX ORDER — SODIUM CHLORIDE, SODIUM LACTATE, POTASSIUM CHLORIDE, CALCIUM CHLORIDE 600; 310; 30; 20 MG/100ML; MG/100ML; MG/100ML; MG/100ML
INJECTION, SOLUTION INTRAVENOUS
Status: DISCONTINUED | OUTPATIENT
Start: 2024-10-07 | End: 2024-10-07 | Stop reason: SDUPTHER

## 2024-10-07 RX ORDER — ASPIRIN 81 MG/1
81 TABLET ORAL DAILY
Qty: 90 TABLET | Refills: 3 | Status: SHIPPED | OUTPATIENT
Start: 2024-10-07

## 2024-10-07 RX ADMIN — SODIUM CHLORIDE, POTASSIUM CHLORIDE, SODIUM LACTATE AND CALCIUM CHLORIDE: 600; 310; 30; 20 INJECTION, SOLUTION INTRAVENOUS at 08:30

## 2024-10-07 RX ADMIN — PROPOFOL 10 MG: 10 INJECTION, EMULSION INTRAVENOUS at 08:48

## 2024-10-07 RX ADMIN — LIDOCAINE HYDROCHLORIDE 50 MG: 20 INJECTION, SOLUTION EPIDURAL; INFILTRATION; INTRACAUDAL; PERINEURAL at 08:34

## 2024-10-07 RX ADMIN — PROPOFOL 100 MG: 10 INJECTION, EMULSION INTRAVENOUS at 08:34

## 2024-10-07 RX ADMIN — PROPOFOL 20 MG: 10 INJECTION, EMULSION INTRAVENOUS at 08:44

## 2024-10-07 RX ADMIN — PROPOFOL 10 MG: 10 INJECTION, EMULSION INTRAVENOUS at 08:46

## 2024-10-07 RX ADMIN — PROPOFOL 20 MG: 10 INJECTION, EMULSION INTRAVENOUS at 08:38

## 2024-10-07 RX ADMIN — PROPOFOL 10 MG: 10 INJECTION, EMULSION INTRAVENOUS at 08:41

## 2024-10-07 NOTE — ANESTHESIA POSTPROCEDURE EVALUATION
Department of Anesthesiology  Postprocedure Note    Patient: Ander Yepez  MRN: [id number]  YOB: 1950  Date of evaluation: 10/7/2024    Procedure Summary       Date: 10/07/24 Room / Location: [facility] NON-INVASIVE CARDIOLOGY    Anesthesia Start: 0830 Anesthesia Stop: 0849    Procedure: MARCUS (PRN CONTRAST/BUBBLE/3D) Diagnosis:       Flutter-fibrillation (HCC)      Status post Rios-Taussig shunt    Scheduled Providers: Flora Allen MD; Dmitry Sigala MD Responsible Provider: Dmitry Sigala MD    Anesthesia Type: MAC ASA Status: 3            Anesthesia Type: MAC    Herman Phase I: Herman Score: 10    Herman Phase II:      Anesthesia Post Evaluation  Post-Anesthesia Evaluation and Assessment    Patient: Ander Yepez MRN: [id number]  SSN: [id number]    YOB: 1950  Age: 74 y.o.  Sex: male      I have evaluated the patient and they are stable and ready for discharge from the PACU.     Cardiovascular Function/Vital Signs  Visit Vitals  /73   Pulse (!) 45   Temp 98.1 °F (36.7 °C) (Oral)   Resp 17   Wt 81.6 kg (180 lb)   SpO2 95%   BMI 29.05 kg/m²       Patient is status post * No anesthesia type entered * anesthesia for * No procedures listed *.    Nausea/Vomiting: None    Postoperative hydration reviewed and adequate.    Pain:      Managed    Neurological Status:       At baseline    Mental Status, Level of Consciousness: Alert and  oriented to person, place, and time    Pulmonary Status:       Adequate oxygenation and airway patent    Complications related to anesthesia: None    Post-anesthesia assessment completed. No concerns    Signed By: Dmitry Sigala MD     October 7, 2024              No notable events documented.

## 2024-10-07 NOTE — PROGRESS NOTES
Patient has returned to baseline at arrival for procedure.  Pt awake, alert, and oriented.  VSS.  Pt denies chest pain, SOB, and dizziness.      I discussed AVS and discharge instructions with patient and with his wife and I provided a copy for him to take home.    Pt and wife voiced understanding and denied any questions or need for clarification.    IV D/C'd and hemostasis attained. Coban and gauze dressing applied.    Transported with:  VIVIAN Miller RN via w/c to vehicle driven by his wife.

## 2024-10-07 NOTE — DISCHARGE INSTRUCTIONS
AFTER YOU TRANSESOPHAGEAL ECHOCARDIOGRAM    Be sure someone else drives you home. You may feel drowsy for several hours.    Be careful when you do eat or drink for the first time especially with hot fluids since you could easily burn your throat.    Call your doctor if:    You are bleeding from your throat or mouth.  You have trouble breathing all of a sudden.  You have chest pain or any pain that spreads to your neck, jaw, or arms.  You have questions or concerns.  You have a fever greater than 101°F.    Doctor: Flora Allen    Special Instructions:    No driving for 24 hours.      YOU MAY STOP TAKING YOUR XARELTO AND START TAKING YOUR BABY ASPIRIN AND PLAVIX UNTIL FURTHER NOTICE; DR. ALLEN WILL SEND PRESCRIPTION TO YOUR PHARMACY; FOLLOW UP WITH YOUR PHYSICIAN AS INSTRUCTED.    .  Future Appointments   Date Time Provider Department Center   10/11/2024 10:20 AM Zechariah Lim MD [facility]   6/30/2025  9:00 AM Florian Lees MD [facility] DEP

## 2024-10-07 NOTE — ANESTHESIA PRE PROCEDURE
Department of Anesthesiology  Preprocedure Note       Name:  Ander Yepez   Age:  74 y.o.  :  1950                                          MRN:  [id number]         Date:  10/7/2024      Surgeon: * No surgeons listed *    Procedure: * No procedures listed *    Medications prior to admission:   Prior to Admission medications    Medication Sig Start Date End Date Taking? Authorizing Provider   losartan (COZAAR) 50 MG tablet Take 1 tablet by mouth once daily 9/3/24  Yes Zechariah Lim MD   rivaroxaban (XARELTO) 20 MG TABS tablet TAKE 1 TABLET BY MOUTH ONCE DAILY WITH SUPPER 24  Yes Zechariah Lim MD   amiodarone (PACERONE) 100 MG tablet Take 1/2 (one-half) tablet by mouth once daily 24  Yes Zechariah Lim MD   hydroCHLOROthiazide (HYDRODIURIL) 25 MG tablet Take 1 tablet by mouth every other day 23  Yes Florian Lees MD   Multiple Vitamins-Minerals (THERAPEUTIC MULTIVITAMIN-MINERALS) tablet Take 1 tablet by mouth daily   Yes ProviderJose MD   b complex vitamins capsule Take 1 capsule by mouth daily 23  Yes Florian Lees MD   amoxicillin (AMOXIL) 500 MG tablet Take 2 tablets 1 hour before dental procedure and 2 tablets 1 hour after 24   Fe West MD   cetirizine (ZYRTEC) 10 MG tablet Take 1 tablet by mouth daily as needed 23   Automatic Reconciliation, Ar       Current medications:    Current Outpatient Medications   Medication Sig Dispense Refill   • losartan (COZAAR) 50 MG tablet Take 1 tablet by mouth once daily 90 tablet 0   • rivaroxaban (XARELTO) 20 MG TABS tablet TAKE 1 TABLET BY MOUTH ONCE DAILY WITH SUPPER 90 tablet 1   • amiodarone (PACERONE) 100 MG tablet Take 1/2 (one-half) tablet by mouth once daily 45 tablet 1   • hydroCHLOROthiazide (HYDRODIURIL) 25 MG tablet Take 1 tablet by mouth every other day     • Multiple Vitamins-Minerals (THERAPEUTIC MULTIVITAMIN-MINERALS) tablet Take 1 tablet by mouth daily     • b complex vitamins capsule Take 1

## 2024-10-07 NOTE — H&P
Smokeless Tobacco Use  Former Quit Date  1990 Smokeless Tobacco Type  Chew              Alcohol History       Alcohol Use Status  Yes Drinks/Week  0 Glasses of wine, 0 Cans of beer, 0 Shots of liquor, 2 Drinks containing 0.5 oz of alcohol per week Amount  2.0 standard drinks of alcohol/wk Comment  Not every week              Drug Use       Drug Use Status  No              Sexual Activity       Sexually Active  Yes Partners  Female Birth Control/Protection  None Comment  Wife                     Review of system:Patient reports no dyspnea/PND/Orthpnea/CP. He reports no cough/fever/focal neurological deficits/abdominal pain.All other systems negative except as above.     Physical Exam  Vitals:    10/07/24 0901 10/07/24 0903 10/07/24 0908 10/07/24 0918   BP: 98/72 99/67 95/67 105/77   Pulse: (!) 45 (!) 44 (!) 46 (!) 47   Resp: 17 18 19 16   Temp:       TempSrc:       SpO2: 98% 99% 98% 96%   Weight:          General:    Alert, cooperative, no distress.   Psychiatric:    Normal Mood and affect    Eye/ENT:      Pupils equal, No asymmetry, Conjunctival pink. Able to hear voice at normal amplitude   Lungs:      Visibly symmetric chest expansion, No palpable tenderness. Clear to auscultation bilaterally.    Heart::    Regular rate and rhythm, S1, S2 normal, No murmur. No click, rub or gallop.No JVD, Normal palpable peripheral pulses. No cyanosis   Abdomen:     Soft, non-tender. Bowel sounds normal. No masses,  No      organomegaly.   Extremities:   Extremities normal, atraumatic, minimal edema.   Neurologic:   CN II-XII grossly intact. No gross focal deficits               Recent Labs:  Lab Results   Component Value Date/Time     08/22/2024 11:00 AM    K 4.2 08/22/2024 11:00 AM     08/22/2024 11:00 AM    CO2 28 08/22/2024 11:00 AM    BUN 16 08/22/2024 11:00 AM    CREATININE 0.97 08/22/2024 11:00 AM    GLUCOSE 92 08/22/2024 11:00 AM    CALCIUM 9.6 08/22/2024 11:00 AM    LABGLOM 81 08/22/2024 11:00 AM    LABGLOM

## 2024-10-07 NOTE — PROGRESS NOTES
Pt arrives ambulatory to noninvasive cardiology department accompanied by his wife for MARCUS procedure. All assessments completed and consent was reviewed.  Education given was regarding procedure, medications to be given, potential side effects of medications to be given, post-procedure management and follow-up. Opportunity for questions was provided and all questions and concerns were addressed. Patient and patient contact verbalized understanding of education.

## 2024-10-16 ENCOUNTER — OFFICE VISIT (OUTPATIENT)
Age: 74
End: 2024-10-16
Payer: MEDICARE

## 2024-10-16 VITALS
TEMPERATURE: 98.8 F | HEIGHT: 66 IN | WEIGHT: 183 LBS | HEART RATE: 71 BPM | SYSTOLIC BLOOD PRESSURE: 132 MMHG | OXYGEN SATURATION: 98 % | DIASTOLIC BLOOD PRESSURE: 86 MMHG | RESPIRATION RATE: 16 BRPM | BODY MASS INDEX: 29.41 KG/M2

## 2024-10-16 DIAGNOSIS — J01.10 ACUTE NON-RECURRENT FRONTAL SINUSITIS: Primary | ICD-10-CM

## 2024-10-16 PROCEDURE — G8484 FLU IMMUNIZE NO ADMIN: HCPCS | Performed by: NURSE PRACTITIONER

## 2024-10-16 PROCEDURE — 1036F TOBACCO NON-USER: CPT | Performed by: NURSE PRACTITIONER

## 2024-10-16 PROCEDURE — 3075F SYST BP GE 130 - 139MM HG: CPT | Performed by: NURSE PRACTITIONER

## 2024-10-16 PROCEDURE — 99213 OFFICE O/P EST LOW 20 MIN: CPT | Performed by: NURSE PRACTITIONER

## 2024-10-16 PROCEDURE — 3079F DIAST BP 80-89 MM HG: CPT | Performed by: NURSE PRACTITIONER

## 2024-10-16 PROCEDURE — 1123F ACP DISCUSS/DSCN MKR DOCD: CPT | Performed by: NURSE PRACTITIONER

## 2024-10-16 PROCEDURE — G8417 CALC BMI ABV UP PARAM F/U: HCPCS | Performed by: NURSE PRACTITIONER

## 2024-10-16 PROCEDURE — G8427 DOCREV CUR MEDS BY ELIG CLIN: HCPCS | Performed by: NURSE PRACTITIONER

## 2024-10-16 RX ORDER — AZITHROMYCIN 250 MG/1
TABLET, FILM COATED ORAL
Qty: 6 TABLET | Refills: 0 | Status: SHIPPED | OUTPATIENT
Start: 2024-10-16 | End: 2024-10-26

## 2024-10-16 ASSESSMENT — PATIENT HEALTH QUESTIONNAIRE - PHQ9
SUM OF ALL RESPONSES TO PHQ QUESTIONS 1-9: 0
2. FEELING DOWN, DEPRESSED OR HOPELESS: NOT AT ALL
SUM OF ALL RESPONSES TO PHQ9 QUESTIONS 1 & 2: 0
SUM OF ALL RESPONSES TO PHQ QUESTIONS 1-9: 0
1. LITTLE INTEREST OR PLEASURE IN DOING THINGS: NOT AT ALL

## 2024-10-16 ASSESSMENT — ENCOUNTER SYMPTOMS: COUGH: 1

## 2024-10-16 NOTE — PROGRESS NOTES
Ander Yepez (:  1950) is a 74 y.o. male,here for evaluation of the following chief complaint(s):  Pharyngitis, Congestion, Fatigue (Started 3 day), and Nasal Congestion    /86   Pulse 71   Temp 98.8 °F (37.1 °C) (Temporal)   Resp 16   Ht 1.676 m (5' 6\")   Wt 83 kg (183 lb)   SpO2 98%   BMI 29.54 kg/m²       SUBJECTIVE/OBJECTIVE:    HPI:Patient reports congestion, sore throat, and fatigue worsening x 3 days. No fever, chills, or body aches. He has taken aspirin for frontal sinus headache. Covid test was negative yesterday.     Review of Systems   HENT:  Positive for congestion.    Respiratory:  Positive for cough.        Physical Exam  Constitutional:       Appearance: Normal appearance.   HENT:      Head: Normocephalic.      Right Ear: Tympanic membrane, ear canal and external ear normal.      Left Ear: Tympanic membrane, ear canal and external ear normal.      Nose: Congestion present.      Comments: Frontal sinus pain     Mouth/Throat:      Mouth: Mucous membranes are moist.      Pharynx: Oropharynx is clear.   Cardiovascular:      Rate and Rhythm: Normal rate and regular rhythm.   Pulmonary:      Effort: Pulmonary effort is normal.      Breath sounds: Normal breath sounds.   Neurological:      General: No focal deficit present.      Mental Status: He is alert and oriented to person, place, and time.   Psychiatric:         Mood and Affect: Mood normal.         Behavior: Behavior normal.          ASSESSMENT/PLAN:  1. Acute non-recurrent frontal sinusitis    Zpak as prescribed  Flonase  Follow-up if no improvement  --MARIELA Balderas - NP

## 2024-10-29 RX ORDER — HYDROCHLOROTHIAZIDE 25 MG/1
25 TABLET ORAL DAILY
Qty: 90 TABLET | Refills: 0 | Status: SHIPPED | OUTPATIENT
Start: 2024-10-29

## 2024-10-29 NOTE — TELEPHONE ENCOUNTER
Cardiologist: Dr. Lim    Future Appointments   Date Time Provider Department Center   10/30/2024 10:15 AM Dmitry Sneed MD TOSP [facility]   1/2/2025 10:40 AM Zechariah Lim MD CAV [facility]   6/30/2025  9:00 AM Florian Lees MD [facility] DEP       Requested Prescriptions     Signed Prescriptions Disp Refills    hydroCHLOROthiazide (HYDRODIURIL) 25 MG tablet 90 tablet 0     Sig: Take 1 tablet by mouth once daily     Authorizing Provider: ZECHARIAH LIM     Ordering User: GILLES PRATT         Refills VO per Dr. Lim.

## 2024-11-01 PROBLEM — Z96.649 FAILED TOTAL HIP ARTHROPLASTY WITH DISLOCATION, SEQUELA: Status: ACTIVE | Noted: 2024-11-01

## 2024-11-01 PROBLEM — T84.028S FAILED TOTAL HIP ARTHROPLASTY WITH DISLOCATION, SEQUELA: Status: ACTIVE | Noted: 2024-11-01

## 2024-11-05 ENCOUNTER — OFFICE VISIT (OUTPATIENT)
Age: 74
End: 2024-11-05
Payer: MEDICARE

## 2024-11-05 VITALS
BODY MASS INDEX: 29.89 KG/M2 | RESPIRATION RATE: 15 BRPM | OXYGEN SATURATION: 97 % | DIASTOLIC BLOOD PRESSURE: 79 MMHG | HEIGHT: 66 IN | TEMPERATURE: 98.1 F | HEART RATE: 58 BPM | SYSTOLIC BLOOD PRESSURE: 127 MMHG | WEIGHT: 186 LBS

## 2024-11-05 DIAGNOSIS — S73.005D DISLOCATION OF LEFT HIP, SUBSEQUENT ENCOUNTER: Primary | ICD-10-CM

## 2024-11-05 DIAGNOSIS — I47.19 ATRIAL TACHYCARDIA (HCC): ICD-10-CM

## 2024-11-05 DIAGNOSIS — E78.2 MIXED HYPERLIPIDEMIA: ICD-10-CM

## 2024-11-05 DIAGNOSIS — R73.01 IMPAIRED FASTING GLUCOSE: ICD-10-CM

## 2024-11-05 DIAGNOSIS — I10 PRIMARY HYPERTENSION: ICD-10-CM

## 2024-11-05 PROCEDURE — 99214 OFFICE O/P EST MOD 30 MIN: CPT | Performed by: INTERNAL MEDICINE

## 2024-11-05 RX ORDER — HYDROCHLOROTHIAZIDE 25 MG/1
25 TABLET ORAL
COMMUNITY
Start: 2024-11-05

## 2024-11-05 NOTE — PROGRESS NOTES
organomegaly  Neurological - 41alert, oriented, normal speech, no focal findings or movement disorder noted  Musculoskeletal - no joint tenderness, deformity or swelling  Extremities - peripheral pulses normal, no pedal edema, no clubbing or cyanosis        DIAGNOSTICS:     1. Labs:    Lab Results   Component Value Date    WBC 7.5 08/22/2024    HGB 14.8 08/22/2024    HCT 44.0 08/22/2024    MCV 86.6 08/22/2024     08/22/2024      Lab Results   Component Value Date     08/22/2024    K 4.2 08/22/2024     08/22/2024    CO2 28 08/22/2024    BUN 16 08/22/2024    CREATININE 0.97 08/22/2024    GLUCOSE 92 08/22/2024    CALCIUM 9.6 08/22/2024    BILITOT 0.7 08/22/2024    ALKPHOS 72 08/22/2024    AST 27 08/22/2024    ALT 27 08/22/2024    LABGLOM 81 08/22/2024    GFRAA >60 06/17/2022    AGRATIO 1.3 11/30/2022    GLOB 2.5 08/22/2024          IMPRESSION:   None  No contraindications to planned surgery  Ander was seen today for pre-op exam.    Diagnoses and all orders for this visit:    Dislocation of left hip, subsequent encounter-cleared for surgery.    Primary hypertension-blood pressure well-controlled on current meds    Impaired fasting glucose-repeat blood test for preop.    Mixed hyperlipidemia-LDL at goal on most recent labs.    Atrial tachycardia (HCC)-status post watchman and will complete Plavix in the next few days.       No follow-up provider specified.   Advised him to call back or return to office if symptoms worsen/change/persist.  Discussed expected course/resolution/complications of diagnosis in detail with patient.    Medication risks/benefits/costs/interactions/alternatives discussed with patient.  He was given an after visit summary which includes diagnoses, current medications, & vitals.  He expressed understanding with the diagnosis and plan.       Florian Lees MD   11/5/2024

## 2024-11-20 ENCOUNTER — HOSPITAL ENCOUNTER (OUTPATIENT)
Facility: HOSPITAL | Age: 74
Discharge: HOME | End: 2024-11-23
Payer: MEDICARE

## 2024-11-20 VITALS
TEMPERATURE: 98 F | SYSTOLIC BLOOD PRESSURE: 124 MMHG | HEART RATE: 48 BPM | HEIGHT: 66 IN | RESPIRATION RATE: 18 BRPM | DIASTOLIC BLOOD PRESSURE: 78 MMHG | WEIGHT: 186.6 LBS | BODY MASS INDEX: 29.99 KG/M2

## 2024-11-20 LAB
ABO + RH BLD: NORMAL
ANION GAP SERPL CALC-SCNC: 5 MMOL/L (ref 2–12)
APPEARANCE UR: CLEAR
BACTERIA URNS QL MICRO: NEGATIVE /HPF
BILIRUB UR QL: NEGATIVE
BLOOD GROUP ANTIBODIES SERPL: NORMAL
BUN SERPL-MCNC: 14 MG/DL (ref 6–20)
BUN/CREAT SERPL: 17 (ref 12–20)
CALCIUM SERPL-MCNC: 9.5 MG/DL (ref 8.5–10.1)
CHLORIDE SERPL-SCNC: 106 MMOL/L (ref 97–108)
CO2 SERPL-SCNC: 29 MMOL/L (ref 21–32)
COLOR UR: NORMAL
CREAT SERPL-MCNC: 0.81 MG/DL (ref 0.7–1.3)
EKG ATRIAL RATE: 54 BPM
EKG DIAGNOSIS: NORMAL
EKG P AXIS: -14 DEGREES
EKG P-R INTERVAL: 166 MS
EKG Q-T INTERVAL: 452 MS
EKG QRS DURATION: 86 MS
EKG QTC CALCULATION (BAZETT): 428 MS
EKG R AXIS: -20 DEGREES
EKG T AXIS: -5 DEGREES
EKG VENTRICULAR RATE: 54 BPM
EPITH CASTS URNS QL MICRO: NORMAL /LPF
ERYTHROCYTE [DISTWIDTH] IN BLOOD BY AUTOMATED COUNT: 14.9 % (ref 11.5–14.5)
EST. AVERAGE GLUCOSE BLD GHB EST-MCNC: 108 MG/DL
GLUCOSE SERPL-MCNC: 93 MG/DL (ref 65–100)
GLUCOSE UR STRIP.AUTO-MCNC: NEGATIVE MG/DL
HBA1C MFR BLD: 5.4 % (ref 4–5.6)
HCT VFR BLD AUTO: 43 % (ref 36.6–50.3)
HGB BLD-MCNC: 14.4 G/DL (ref 12.1–17)
HGB UR QL STRIP: NEGATIVE
HYALINE CASTS URNS QL MICRO: NORMAL /LPF (ref 0–5)
INR PPP: 1.1 (ref 0.9–1.1)
KETONES UR QL STRIP.AUTO: NEGATIVE MG/DL
LEUKOCYTE ESTERASE UR QL STRIP.AUTO: NEGATIVE
MCH RBC QN AUTO: 29.3 PG (ref 26–34)
MCHC RBC AUTO-ENTMCNC: 33.5 G/DL (ref 30–36.5)
MCV RBC AUTO: 87.4 FL (ref 80–99)
NITRITE UR QL STRIP.AUTO: NEGATIVE
NRBC # BLD: 0 K/UL (ref 0–0.01)
NRBC BLD-RTO: 0 PER 100 WBC
PH UR STRIP: 6 (ref 5–8)
PLATELET # BLD AUTO: 324 K/UL (ref 150–400)
PMV BLD AUTO: 10.4 FL (ref 8.9–12.9)
POTASSIUM SERPL-SCNC: 4 MMOL/L (ref 3.5–5.1)
PROT UR STRIP-MCNC: NEGATIVE MG/DL
PROTHROMBIN TIME: 11.3 SEC (ref 9–11.1)
RBC # BLD AUTO: 4.92 M/UL (ref 4.1–5.7)
RBC #/AREA URNS HPF: NORMAL /HPF (ref 0–5)
SODIUM SERPL-SCNC: 140 MMOL/L (ref 136–145)
SP GR UR REFRACTOMETRY: 1.01 (ref 1–1.03)
SPECIMEN EXP DATE BLD: NORMAL
URINE CULTURE IF INDICATED: NORMAL
UROBILINOGEN UR QL STRIP.AUTO: 0.2 EU/DL (ref 0.2–1)
WBC # BLD AUTO: 6.5 K/UL (ref 4.1–11.1)
WBC URNS QL MICRO: NORMAL /HPF (ref 0–4)

## 2024-11-20 PROCEDURE — 86900 BLOOD TYPING SEROLOGIC ABO: CPT

## 2024-11-20 PROCEDURE — 93005 ELECTROCARDIOGRAM TRACING: CPT | Performed by: ORTHOPAEDIC SURGERY

## 2024-11-20 PROCEDURE — 86901 BLOOD TYPING SEROLOGIC RH(D): CPT

## 2024-11-20 PROCEDURE — 80048 BASIC METABOLIC PNL TOTAL CA: CPT

## 2024-11-20 PROCEDURE — 83036 HEMOGLOBIN GLYCOSYLATED A1C: CPT

## 2024-11-20 PROCEDURE — 36415 COLL VENOUS BLD VENIPUNCTURE: CPT

## 2024-11-20 PROCEDURE — 81001 URINALYSIS AUTO W/SCOPE: CPT

## 2024-11-20 PROCEDURE — 85610 PROTHROMBIN TIME: CPT

## 2024-11-20 PROCEDURE — 85027 COMPLETE CBC AUTOMATED: CPT

## 2024-11-20 PROCEDURE — 86850 RBC ANTIBODY SCREEN: CPT

## 2024-11-20 PROCEDURE — APPNB30 APP NON BILLABLE TIME 0-30 MINS: Performed by: NURSE PRACTITIONER

## 2024-11-20 ASSESSMENT — PROMIS GLOBAL HEALTH SCALE
IN GENERAL, WOULD YOU SAY YOUR HEALTH IS...[ON A SCALE OF 1 (POOR) TO 5 (EXCELLENT)]: GOOD
WHO IS THE PERSON COMPLETING THE PROMIS V1.1 SURVEY?: SELF
IN THE PAST 7 DAYS, HOW WOULD YOU RATE YOUR PAIN ON AVERAGE [ON A SCALE FROM 0 (NO PAIN) TO 10 (WORST IMAGINABLE PAIN)]?: 4
SUM OF RESPONSES TO QUESTIONS 3, 6, 7, & 8: 18
HOW IS THE PROMIS V1.1 BEING ADMINISTERED?: PAPER
IN GENERAL, PLEASE RATE HOW WELL YOU CARRY OUT YOUR USUAL SOCIAL ACTIVITIES (INCLUDES ACTIVITIES AT HOME, AT WORK, AND IN YOUR COMMUNITY, AND RESPONSIBILITIES AS A PARENT, CHILD, SPOUSE, EMPLOYEE, FRIEND, ETC) [ON A SCALE OF 1 (POOR) TO 5 (EXCELLENT)]?: VERY GOOD
SUM OF RESPONSES TO QUESTIONS 2, 4, 5, & 10: 17
IN GENERAL, HOW WOULD YOU RATE YOUR MENTAL HEALTH, INCLUDING YOUR MOOD AND YOUR ABILITY TO THINK [ON A SCALE OF 1 (POOR) TO 5 (EXCELLENT)]?: VERY GOOD
IN GENERAL, HOW WOULD YOU RATE YOUR SATISFACTION WITH YOUR SOCIAL ACTIVITIES AND RELATIONSHIPS [ON A SCALE OF 1 (POOR) TO 5 (EXCELLENT)]?: VERY GOOD
IN THE PAST 7 DAYS, HOW OFTEN HAVE YOU BEEN BOTHERED BY EMOTIONAL PROBLEMS, SUCH AS FEELING ANXIOUS, DEPRESSED, OR IRRITABLE [ON A SCALE FROM 1 (NEVER) TO 5 (ALWAYS)]?: NEVER
IN GENERAL, WOULD YOU SAY YOUR QUALITY OF LIFE IS...[ON A SCALE OF 1 (POOR) TO 5 (EXCELLENT)]: VERY GOOD
TO WHAT EXTENT ARE YOU ABLE TO CARRY OUT YOUR EVERYDAY PHYSICAL ACTIVITIES SUCH AS WALKING, CLIMBING STAIRS, CARRYING GROCERIES, OR MOVING A CHAIR [ON A SCALE OF 1 (NOT AT ALL) TO 5 (COMPLETELY)]?: COMPLETELY
IN GENERAL, HOW WOULD YOU RATE YOUR PHYSICAL HEALTH [ON A SCALE OF 1 (POOR) TO 5 (EXCELLENT)]?: VERY GOOD

## 2024-11-20 ASSESSMENT — HOOS JR
BENDING TO THE FLOOR TO PICK UP OBJECT: MILD
HOOS JR TOTAL INTERVAL SCORE: 61.815
WALKING ON UNEVEN SURFACE: MODERATE
RISING FROM SITTING: SEVERE
HOOS JR RAW SCORE: 9
LYING IN BED (TURNING OVER, MAINTAINING HIP POSITION): MILD
GOING UP OR DOWN STAIRS: MODERATE
HOOS JR RAW SCORE: 9

## 2024-11-21 LAB
BACTERIA SPEC CULT: NORMAL
BACTERIA SPEC CULT: NORMAL
SERVICE CMNT-IMP: NORMAL

## 2024-11-21 NOTE — PERIOP NOTE
INSTRUCTIONS GIVEN AND REVIEWED, 2 BOTTLES OF SOAP GIVEN, PT GIVEN TIME TO ASK QUESTIONS     EKG PERFORMED  
PAT: 11-20 @ 9;30   
ALL INSTRUCTIONS GIVEN BY SURGEONS OFFICE        Preventing Infections Before and After - Your Surgery    IMPORTANT INSTRUCTIONS      You play an important role in your health and preparation for surgery. To reduce the germs on your skin you will need to shower with CHG soap (Chorhexidine gluconate 4%) two times before surgery.    CHG soap (Hibiclens, Hex-A-Clens or store brand)  CHG soap will be provided at your Preadmission Testing (PAT) appointment.  If you do not have a PAT appointment before surgery, you may arrange to  CHG soap from our office or purchase CHG soap at a pharmacy, grocery or department store.  You need to purchase TWO 4 ounce bottles to use for your 2 showers.    Shower with CHG soap 2 times before your surgery  The evening before your surgery  The morning of your surgery    Steps to follow:  Wash your hair with your normal shampoo and your body with regular soap and rinse well to remove shampoo and soap from your skin.  Wet a clean washcloth and turn off the shower.  Put CHG soap on washcloth and apply to your entire body from the neck down. Do not use on your head, face or private parts(genitals). Do not use CHG soap on open sores, wounds or areas of skin irritation.  Wash your body gently for 5 minutes. Do not wash your skin too hard. This soap does not create lather. Pay special attention to your underarms and from your belly button to your feet.  Turn the shower back on and rinse well to get CHG soap off your body.  Pat your skin dry with a clean, dry towel. Do not apply lotions or moisturizer.  Put on clean clothes and sleep on fresh bed sheets and do not allow pets to sleep with you.      Tips to help prevent infections after your surgery:  Protect your surgical wound from germs:  Hand washing is the most important thing you and your caregivers can do to prevent infections.  Keep your bandage clean and dry!  Do not touch your surgical wound.  Use clean, freshly washed towels and 
Consider Prediabetes  >6.5              Consider Diabetes      Estimated Avg Glucose 11/20/2024 108  mg/dL Final    INR 11/20/2024 1.1  0.9 - 1.1   Final    A single therapeutic range for Vit K antagonists may not be optimal for all indications - see June, 2008 issue of Chest, American College of Chest Physicians Evidence-Based Clinical Practice Guidelines, 8th Edition.    Protime 11/20/2024 11.3 (H)  9.0 - 11.1 sec Final    Crossmatch expiration date 11/20/2024 12/04/2024,2359   Final    ABO/Rh 11/20/2024 A POSITIVE   Final    Antibody Screen 11/20/2024 NEG   Final    Color, UA 11/20/2024 YELLOW/STRAW    Final    Color Reference Range: Straw, Yellow or Dark Yellow    Appearance 11/20/2024 CLEAR  CLEAR   Final    Specific Gravity, UA 11/20/2024 1.010  1.003 - 1.030   Final    pH, Urine 11/20/2024 6.0  5.0 - 8.0   Final    Protein, UA 11/20/2024 Negative  NEG mg/dL Final    Glucose, Ur 11/20/2024 Negative  NEG mg/dL Final    Ketones, Urine 11/20/2024 Negative  NEG mg/dL Final    Bilirubin, Urine 11/20/2024 Negative  NEG   Final    Blood, Urine 11/20/2024 Negative  NEG   Final    Urobilinogen, Urine 11/20/2024 0.2  0.2 - 1.0 EU/dL Final    Nitrite, Urine 11/20/2024 Negative  NEG   Final    Leukocyte Esterase, Urine 11/20/2024 Negative  NEG   Final    WBC, UA 11/20/2024 0-4  0 - 4 /hpf Final    RBC, UA 11/20/2024 0-5  0 - 5 /hpf Final    Epithelial Cells, UA 11/20/2024 FEW  FEW /lpf Final    Epithelial cell category consists of squamous cells and /or transitional urothelial cells. Renal tubular cells, if present, are separately identified as such.    BACTERIA, URINE 11/20/2024 Negative  NEG /hpf Final    Urine Culture if Indicated 11/20/2024 CULTURE NOT INDICATED BY UA RESULT  CNI   Final    Hyaline Casts, UA 11/20/2024 0-2  0 - 5 /lpf Final                MARIELA FUNES - NP  Available via [employer]

## 2024-11-22 PROBLEM — Z01.818 ENCOUNTER FOR PREADMISSION TESTING: Status: ACTIVE | Noted: 2024-11-22

## 2024-11-22 RX ORDER — AMIODARONE HYDROCHLORIDE 100 MG/1
TABLET ORAL
Qty: 45 TABLET | Refills: 0 | Status: SHIPPED | OUTPATIENT
Start: 2024-11-22

## 2024-11-22 NOTE — TELEPHONE ENCOUNTER
Cardiologist: Dr. Lim    Future Appointments   Date Time Provider Department Center   1/2/2025 10:40 AM Zechariah Lim MD CAV [facility]   6/30/2025  9:00 AM [facility]   8/27/2025 10:40 AM Flora Allen MD CAVREY BS AMB       Requested Prescriptions     Signed Prescriptions Disp Refills    PACERONE 100 MG tablet 45 tablet 0     Sig: Take 1/2 (one-half) tablet by mouth once daily     Authorizing Provider: ZECHARIAH LIM     Ordering User: GILLES PRATT         Refdenia VO per Dr. Lim.

## 2024-11-24 RX ORDER — CETIRIZINE HYDROCHLORIDE 10 MG/1
10 TABLET ORAL NIGHTLY
Qty: 90 TABLET | Refills: 3 | Status: SHIPPED | OUTPATIENT
Start: 2024-11-24

## 2024-11-25 ENCOUNTER — TELEPHONE (OUTPATIENT)
Age: 74
End: 2024-11-25

## 2024-11-25 NOTE — TELEPHONE ENCOUNTER
Patient states he is having a procedure on 12/03 and they need something in writing before 11/28/2024 for cardiac clearance.      Phone [phone]

## 2024-11-25 NOTE — TELEPHONE ENCOUNTER
Per Dr. Lim:    I have not seen the patient since April 2024,  therefore is always hard to \"clear\" for surgery,  but if he has had no symptoms of significant cardiac wise and no chest pain or shortness of breath.  He may proceed with surgery as planned    Concerning oral anticoagulation the patient is on aspirin and Plavix post watchman.  MARCUS in October 2024 , with complete occlusion of the left atrial appendage by Watchman device    The patient remains in normal sinus rhythm at this time by EKG.    Okay to hold Plavix and aspirin 5 days prior to procedure.  Resume aspirin soon after the procedure     Faxed to Dr. Dmitry Sneed @ [phone]. Confirmation received.

## 2024-11-27 NOTE — DISCHARGE INSTRUCTIONS
Discharge Instructions Hip Replacement  Dr. Sneed      Patient Name  Ander Yepez  Procedure  Procedure(s):  LEFT TOTAL HIP REVISION - POSTERIOR APPROACH  Surgeon  Surgeons and Role:     * Dmitry Sneed MD - Primary    Follow up care  Follow up visit with Dr. Sneed in 3 weeks.  Call [phone]  to make an appointment    Activity at home  AVOID sudden and extreme movement of your hip (surgical leg)  Take a short walk every hour; except at night when sleeping  Do your Home Exercise Program 3 times every day   After exercising lie down and elevate your leg on pillows for 15-30 minutes to decrease swelling  Refer to your patient notebook for more information    Bathing and caring for your incision  You may take a shower with your waterproof dressing on your hip.  The waterproof dressing is to stay on your hip for 7 days.  On the 7th day have someone gently peel the dressing off by lifting the edge and stretching it to break the seal.  You may then leave your incision open to air unless you see drainage from your hip.      Preventing blood clots  Continue taking Plavix & Aspirin every day as prescribed.  Call Dr. Sneed if you have side effects of blood thinning medication: bleeding, bruising, upset stomach, or diarrhea.   Call Dr. Sneed for signs of a blood clot in your leg: calf pain, tenderness, redness, swelling of lower leg    Preventing lung congestion  Use your incentive spirometer 4 times a day; do 10 repetitions each time  Remember to keep the small blue ball between the two arrows when taking a slow, deep breath           Pain Management  Get up and walk a short distance to relieve pain and stiffness.  Place ice wrap on your hip except when you are walking. The gel ice packs should be changed about every 4 hours  Elevate your leg on pillows for 15-30 minutes   Take Tylenol 650mg (take two 325mg tablets) every 6 hours for pain.  If needed, take a narcotic pain pill every 4-6 hours as

## 2024-11-29 RX ORDER — LOSARTAN POTASSIUM 50 MG/1
50 TABLET ORAL DAILY
Qty: 90 TABLET | Refills: 1 | Status: SHIPPED | OUTPATIENT
Start: 2024-11-29

## 2024-11-29 NOTE — TELEPHONE ENCOUNTER
Requested Prescriptions     Signed Prescriptions Disp Refills    losartan (COZAAR) 50 MG tablet 90 tablet 1     Sig: Take 1 tablet by mouth once daily     Authorizing Provider: ZECHARIAH LIM     Ordering User: CRISTINO RAINEY     Verbal order per Dr. Lim.    Future Appointments   Date Time Provider Department Center   1/2/2025 10:40 AM Zechariah Lim MD CAV BS AMB   6/30/2025  9:00 AM Florian Lees MD [facility]   8/27/2025 10:40 AM Flora Allen MD CAVREY BS AMB

## 2024-12-02 NOTE — H&P
Ander Yepez (: 1950) is a 74 y.o. male, patient, here for evaluation of the following chief complaint(s):  Other (Hip popping out of place - hx of LTHA )        HPI:     Chief complaint is left hip pain.  Hip starting to sublux.  25-year-old hip.  Here today for scheduling revision.  He and I have discussed that in the past.     Allergies   No Known Allergies        Current Facility-Administered Medications          Current Outpatient Medications   Medication Sig Dispense Refill    hydroCHLOROthiazide (HYDRODIURIL) 25 MG tablet Take 1 tablet by mouth once daily 90 tablet 0    aspirin 81 MG EC tablet Take 1 tablet by mouth daily 90 tablet 3    clopidogrel (PLAVIX) 75 MG tablet Take 1 tablet by mouth daily 30 tablet 3    losartan (COZAAR) 50 MG tablet Take 1 tablet by mouth once daily 90 tablet 0    amoxicillin (AMOXIL) 500 MG tablet Take 2 tablets 1 hour before dental procedure and 2 tablets 1 hour after 4 tablet 4    amiodarone (PACERONE) 100 MG tablet Take 1/2 (one-half) tablet by mouth once daily 45 tablet 1    Multiple Vitamins-Minerals (THERAPEUTIC MULTIVITAMIN-MINERALS) tablet Take 1 tablet by mouth daily        b complex vitamins capsule Take 1 capsule by mouth daily 30 capsule 3    cetirizine (ZYRTEC) 10 MG tablet Take 1 tablet by mouth daily as needed          No current facility-administered medications for this visit.            Past Medical History        Past Medical History:   Diagnosis Date    A-fib (HCC)      Arrhythmia 2021     Cardioverted     LUIS CARLOS (generalized anxiety disorder) 2019     RESOLVED    Hypertension      OA (osteoarthritis) 2011    Sleep apnea              Past Surgical History         Past Surgical History:   Procedure Laterality Date    CARDIAC PROCEDURE N/A 2024     Tavon during cath case performed by Flora Allen MD at [facility] CARDIAC CATH LAB    CARDIAC PROCEDURE N/A 2024     Intracardiac echocardiogram performed by Flora Allen MD at [facility] CARDIAC

## 2024-12-03 ENCOUNTER — ANESTHESIA EVENT (OUTPATIENT)
Facility: HOSPITAL | Age: 74
DRG: 467 | End: 2024-12-03
Payer: MEDICARE

## 2024-12-03 ENCOUNTER — APPOINTMENT (OUTPATIENT)
Facility: HOSPITAL | Age: 74
DRG: 467 | End: 2024-12-03
Attending: ORTHOPAEDIC SURGERY
Payer: MEDICARE

## 2024-12-03 ENCOUNTER — HOSPITAL ENCOUNTER (INPATIENT)
Facility: HOSPITAL | Age: 74
LOS: 1 days | Discharge: HOME HEALTH | DRG: 467 | End: 2024-12-04
Attending: ORTHOPAEDIC SURGERY | Admitting: ORTHOPAEDIC SURGERY
Payer: MEDICARE

## 2024-12-03 ENCOUNTER — ANESTHESIA (OUTPATIENT)
Facility: HOSPITAL | Age: 74
DRG: 467 | End: 2024-12-03
Payer: MEDICARE

## 2024-12-03 DIAGNOSIS — Z96.649 S/P REVISION OF TOTAL HIP: Primary | ICD-10-CM

## 2024-12-03 PROBLEM — T84.018A FAILED TOTAL HIP ARTHROPLASTY, INITIAL ENCOUNTER (HCC): Status: ACTIVE | Noted: 2024-12-03

## 2024-12-03 LAB
GLUCOSE BLD STRIP.AUTO-MCNC: 86 MG/DL (ref 65–117)
SERVICE CMNT-IMP: NORMAL

## 2024-12-03 PROCEDURE — 6360000002 HC RX W HCPCS: Performed by: ORTHOPAEDIC SURGERY

## 2024-12-03 PROCEDURE — 2580000003 HC RX 258: Performed by: PHYSICIAN ASSISTANT

## 2024-12-03 PROCEDURE — 6360000002 HC RX W HCPCS: Performed by: PHYSICIAN ASSISTANT

## 2024-12-03 PROCEDURE — 2500000003 HC RX 250 WO HCPCS: Performed by: PHYSICIAN ASSISTANT

## 2024-12-03 PROCEDURE — 27134 REVISE HIP JOINT REPLACEMENT: CPT | Performed by: PHYSICIAN ASSISTANT

## 2024-12-03 PROCEDURE — 82962 GLUCOSE BLOOD TEST: CPT

## 2024-12-03 PROCEDURE — 97161 PT EVAL LOW COMPLEX 20 MIN: CPT

## 2024-12-03 PROCEDURE — C1713 ANCHOR/SCREW BN/BN,TIS/BN: HCPCS | Performed by: ORTHOPAEDIC SURGERY

## 2024-12-03 PROCEDURE — 3600000005 HC SURGERY LEVEL 5 BASE: Performed by: ORTHOPAEDIC SURGERY

## 2024-12-03 PROCEDURE — 0SRB02A REPLACEMENT OF LEFT HIP JOINT WITH METAL ON POLYETHYLENE SYNTHETIC SUBSTITUTE, UNCEMENTED, OPEN APPROACH: ICD-10-PCS | Performed by: ORTHOPAEDIC SURGERY

## 2024-12-03 PROCEDURE — L1830 KO IMMOB CANVAS LONG PRE OTS: HCPCS | Performed by: ORTHOPAEDIC SURGERY

## 2024-12-03 PROCEDURE — 6370000000 HC RX 637 (ALT 250 FOR IP): Performed by: NURSE PRACTITIONER

## 2024-12-03 PROCEDURE — G0378 HOSPITAL OBSERVATION PER HR: HCPCS

## 2024-12-03 PROCEDURE — 2580000003 HC RX 258: Performed by: ORTHOPAEDIC SURGERY

## 2024-12-03 PROCEDURE — 1100000000 HC RM PRIVATE

## 2024-12-03 PROCEDURE — C1776 JOINT DEVICE (IMPLANTABLE): HCPCS | Performed by: ORTHOPAEDIC SURGERY

## 2024-12-03 PROCEDURE — 2580000003 HC RX 258: Performed by: ANESTHESIOLOGY

## 2024-12-03 PROCEDURE — P9045 ALBUMIN (HUMAN), 5%, 250 ML: HCPCS | Performed by: NURSE ANESTHETIST, CERTIFIED REGISTERED

## 2024-12-03 PROCEDURE — 2500000003 HC RX 250 WO HCPCS: Performed by: NURSE ANESTHETIST, CERTIFIED REGISTERED

## 2024-12-03 PROCEDURE — 3700000000 HC ANESTHESIA ATTENDED CARE: Performed by: ORTHOPAEDIC SURGERY

## 2024-12-03 PROCEDURE — 6360000002 HC RX W HCPCS: Performed by: NURSE ANESTHETIST, CERTIFIED REGISTERED

## 2024-12-03 PROCEDURE — 2580000003 HC RX 258: Performed by: NURSE ANESTHETIST, CERTIFIED REGISTERED

## 2024-12-03 PROCEDURE — 97116 GAIT TRAINING THERAPY: CPT

## 2024-12-03 PROCEDURE — 6370000000 HC RX 637 (ALT 250 FOR IP): Performed by: PHYSICIAN ASSISTANT

## 2024-12-03 PROCEDURE — 0SPB0JZ REMOVAL OF SYNTHETIC SUBSTITUTE FROM LEFT HIP JOINT, OPEN APPROACH: ICD-10-PCS | Performed by: ORTHOPAEDIC SURGERY

## 2024-12-03 PROCEDURE — 7100000000 HC PACU RECOVERY - FIRST 15 MIN: Performed by: ORTHOPAEDIC SURGERY

## 2024-12-03 PROCEDURE — 6360000002 HC RX W HCPCS: Performed by: ANESTHESIOLOGY

## 2024-12-03 PROCEDURE — 97530 THERAPEUTIC ACTIVITIES: CPT

## 2024-12-03 PROCEDURE — C9290 INJ, BUPIVACAINE LIPOSOME: HCPCS | Performed by: ORTHOPAEDIC SURGERY

## 2024-12-03 PROCEDURE — 3600000015 HC SURGERY LEVEL 5 ADDTL 15MIN: Performed by: ORTHOPAEDIC SURGERY

## 2024-12-03 PROCEDURE — 2709999900 HC NON-CHARGEABLE SUPPLY: Performed by: ORTHOPAEDIC SURGERY

## 2024-12-03 PROCEDURE — 7100000001 HC PACU RECOVERY - ADDTL 15 MIN: Performed by: ORTHOPAEDIC SURGERY

## 2024-12-03 PROCEDURE — 27134 REVISE HIP JOINT REPLACEMENT: CPT | Performed by: ORTHOPAEDIC SURGERY

## 2024-12-03 PROCEDURE — 73501 X-RAY EXAM HIP UNI 1 VIEW: CPT

## 2024-12-03 PROCEDURE — 3700000001 HC ADD 15 MINUTES (ANESTHESIA): Performed by: ORTHOPAEDIC SURGERY

## 2024-12-03 DEVICE — BI-MENTUM ALTRX LINER 53/28
Type: IMPLANTABLE DEVICE | Site: HIP | Status: FUNCTIONAL
Brand: BI-MENTUM ALTRX

## 2024-12-03 DEVICE — PINNACLE CANCELLOUS BONE SCREW 6.5MM X 60MM
Type: IMPLANTABLE DEVICE | Site: HIP | Status: FUNCTIONAL
Brand: PINNACLE

## 2024-12-03 DEVICE — PINNACLE CANCELLOUS BONE SCREW 6.5MM X 20MM
Type: IMPLANTABLE DEVICE | Site: HIP | Status: FUNCTIONAL
Brand: PINNACLE

## 2024-12-03 DEVICE — PINNACLE CANCELLOUS BONE SCREW 6.5MM X 50MM
Type: IMPLANTABLE DEVICE | Site: HIP | Status: FUNCTIONAL
Brand: PINNACLE

## 2024-12-03 DEVICE — PINNACLE GRIPTION ACETABULAR SHELL MULTI-HOLE 62MM OD
Type: IMPLANTABLE DEVICE | Site: HIP | Status: FUNCTIONAL
Brand: PINNACLE GRIPTION

## 2024-12-03 DEVICE — PINNACLE CANCELLOUS BONE SCREW 6.5MM X 25MM
Type: IMPLANTABLE DEVICE | Site: HIP | Status: FUNCTIONAL
Brand: PINNACLE

## 2024-12-03 DEVICE — PINNACLE HIP SOLUTIONS DUAL MOBILITY LINER PINNACLE ACETABULAR SHELL BI-MENTUM PE LINER 62/53 62MM 53/28
Type: IMPLANTABLE DEVICE | Site: HIP | Status: FUNCTIONAL
Brand: PINNACLE HIP SOLUTIONS

## 2024-12-03 DEVICE — IMPLANTABLE DEVICE: Type: IMPLANTABLE DEVICE | Site: HIP | Status: FUNCTIONAL

## 2024-12-03 DEVICE — PINNACLE CANCELLOUS BONE SCREW 6.5MM X 15MM
Type: IMPLANTABLE DEVICE | Site: HIP | Status: FUNCTIONAL
Brand: PINNACLE

## 2024-12-03 RX ORDER — ONDANSETRON 2 MG/ML
INJECTION INTRAMUSCULAR; INTRAVENOUS
Status: DISCONTINUED | OUTPATIENT
Start: 2024-12-03 | End: 2024-12-03 | Stop reason: SDUPTHER

## 2024-12-03 RX ORDER — FENTANYL CITRATE 50 UG/ML
25 INJECTION, SOLUTION INTRAMUSCULAR; INTRAVENOUS EVERY 5 MIN PRN
Status: DISCONTINUED | OUTPATIENT
Start: 2024-12-03 | End: 2024-12-03 | Stop reason: HOSPADM

## 2024-12-03 RX ORDER — PROCHLORPERAZINE EDISYLATE 5 MG/ML
5 INJECTION INTRAMUSCULAR; INTRAVENOUS
Status: DISCONTINUED | OUTPATIENT
Start: 2024-12-03 | End: 2024-12-03 | Stop reason: HOSPADM

## 2024-12-03 RX ORDER — SODIUM CHLORIDE 0.9 % (FLUSH) 0.9 %
5-40 SYRINGE (ML) INJECTION PRN
Status: DISCONTINUED | OUTPATIENT
Start: 2024-12-03 | End: 2024-12-04 | Stop reason: HOSPADM

## 2024-12-03 RX ORDER — POLYETHYLENE GLYCOL 3350 17 G/17G
17 POWDER, FOR SOLUTION ORAL DAILY
Status: DISCONTINUED | OUTPATIENT
Start: 2024-12-03 | End: 2024-12-04 | Stop reason: HOSPADM

## 2024-12-03 RX ORDER — PROPOFOL 10 MG/ML
INJECTION, EMULSION INTRAVENOUS
Status: DISCONTINUED | OUTPATIENT
Start: 2024-12-03 | End: 2024-12-03 | Stop reason: SDUPTHER

## 2024-12-03 RX ORDER — SENNA AND DOCUSATE SODIUM 50; 8.6 MG/1; MG/1
1 TABLET, FILM COATED ORAL 2 TIMES DAILY
Status: DISCONTINUED | OUTPATIENT
Start: 2024-12-03 | End: 2024-12-04 | Stop reason: HOSPADM

## 2024-12-03 RX ORDER — SODIUM CHLORIDE, SODIUM LACTATE, POTASSIUM CHLORIDE, CALCIUM CHLORIDE 600; 310; 30; 20 MG/100ML; MG/100ML; MG/100ML; MG/100ML
INJECTION, SOLUTION INTRAVENOUS CONTINUOUS
Status: DISCONTINUED | OUTPATIENT
Start: 2024-12-03 | End: 2024-12-03

## 2024-12-03 RX ORDER — ALBUMIN HUMAN 50 G/1000ML
SOLUTION INTRAVENOUS
Status: DISCONTINUED | OUTPATIENT
Start: 2024-12-03 | End: 2024-12-03 | Stop reason: SDUPTHER

## 2024-12-03 RX ORDER — SODIUM CHLORIDE 9 MG/ML
INJECTION, SOLUTION INTRAVENOUS CONTINUOUS
Status: DISCONTINUED | OUTPATIENT
Start: 2024-12-03 | End: 2024-12-03

## 2024-12-03 RX ORDER — SODIUM CHLORIDE 0.9 % (FLUSH) 0.9 %
5-40 SYRINGE (ML) INJECTION EVERY 12 HOURS SCHEDULED
Status: DISCONTINUED | OUTPATIENT
Start: 2024-12-03 | End: 2024-12-03 | Stop reason: HOSPADM

## 2024-12-03 RX ORDER — HYDROMORPHONE HYDROCHLORIDE 1 MG/ML
0.5 INJECTION, SOLUTION INTRAMUSCULAR; INTRAVENOUS; SUBCUTANEOUS EVERY 5 MIN PRN
Status: DISCONTINUED | OUTPATIENT
Start: 2024-12-03 | End: 2024-12-03 | Stop reason: HOSPADM

## 2024-12-03 RX ORDER — ASPIRIN 81 MG/1
81 TABLET ORAL
Status: DISCONTINUED | OUTPATIENT
Start: 2024-12-03 | End: 2024-12-04 | Stop reason: HOSPADM

## 2024-12-03 RX ORDER — MORPHINE SULFATE 2 MG/ML
2 INJECTION, SOLUTION INTRAMUSCULAR; INTRAVENOUS
Status: DISCONTINUED | OUTPATIENT
Start: 2024-12-03 | End: 2024-12-04 | Stop reason: HOSPADM

## 2024-12-03 RX ORDER — NALOXONE HYDROCHLORIDE 0.4 MG/ML
INJECTION, SOLUTION INTRAMUSCULAR; INTRAVENOUS; SUBCUTANEOUS PRN
Status: DISCONTINUED | OUTPATIENT
Start: 2024-12-03 | End: 2024-12-03 | Stop reason: HOSPADM

## 2024-12-03 RX ORDER — OXYCODONE HYDROCHLORIDE 5 MG/1
10 TABLET ORAL EVERY 4 HOURS PRN
Status: DISCONTINUED | OUTPATIENT
Start: 2024-12-03 | End: 2024-12-04 | Stop reason: HOSPADM

## 2024-12-03 RX ORDER — ACETAMINOPHEN 325 MG/1
650 TABLET ORAL EVERY 6 HOURS
Status: DISCONTINUED | OUTPATIENT
Start: 2024-12-03 | End: 2024-12-04 | Stop reason: HOSPADM

## 2024-12-03 RX ORDER — ONDANSETRON 2 MG/ML
4 INJECTION INTRAMUSCULAR; INTRAVENOUS
Status: DISCONTINUED | OUTPATIENT
Start: 2024-12-03 | End: 2024-12-03 | Stop reason: HOSPADM

## 2024-12-03 RX ORDER — OXYCODONE HYDROCHLORIDE 5 MG/1
5 TABLET ORAL EVERY 4 HOURS PRN
Status: DISCONTINUED | OUTPATIENT
Start: 2024-12-03 | End: 2024-12-04 | Stop reason: HOSPADM

## 2024-12-03 RX ORDER — ROCURONIUM BROMIDE 10 MG/ML
INJECTION, SOLUTION INTRAVENOUS
Status: DISCONTINUED | OUTPATIENT
Start: 2024-12-03 | End: 2024-12-03 | Stop reason: SDUPTHER

## 2024-12-03 RX ORDER — SODIUM CHLORIDE 0.9 % (FLUSH) 0.9 %
5-40 SYRINGE (ML) INJECTION PRN
Status: DISCONTINUED | OUTPATIENT
Start: 2024-12-03 | End: 2024-12-03 | Stop reason: HOSPADM

## 2024-12-03 RX ORDER — 0.9 % SODIUM CHLORIDE 0.9 %
500 INTRAVENOUS SOLUTION INTRAVENOUS
Status: DISCONTINUED | OUTPATIENT
Start: 2024-12-03 | End: 2024-12-04 | Stop reason: HOSPADM

## 2024-12-03 RX ORDER — OXYCODONE HYDROCHLORIDE 5 MG/1
5 TABLET ORAL
Status: DISCONTINUED | OUTPATIENT
Start: 2024-12-03 | End: 2024-12-03 | Stop reason: HOSPADM

## 2024-12-03 RX ORDER — SODIUM CHLORIDE 9 MG/ML
INJECTION, SOLUTION INTRAVENOUS PRN
Status: DISCONTINUED | OUTPATIENT
Start: 2024-12-03 | End: 2024-12-03 | Stop reason: HOSPADM

## 2024-12-03 RX ORDER — HYDROCHLOROTHIAZIDE 25 MG/1
25 TABLET ORAL
Status: DISCONTINUED | OUTPATIENT
Start: 2024-12-05 | End: 2024-12-04 | Stop reason: HOSPADM

## 2024-12-03 RX ORDER — MIDAZOLAM HYDROCHLORIDE 1 MG/ML
INJECTION, SOLUTION INTRAMUSCULAR; INTRAVENOUS
Status: DISCONTINUED | OUTPATIENT
Start: 2024-12-03 | End: 2024-12-03 | Stop reason: SDUPTHER

## 2024-12-03 RX ORDER — LOSARTAN POTASSIUM 50 MG/1
50 TABLET ORAL DAILY
Status: DISCONTINUED | OUTPATIENT
Start: 2024-12-03 | End: 2024-12-04 | Stop reason: HOSPADM

## 2024-12-03 RX ORDER — CLOPIDOGREL BISULFATE 75 MG/1
75 TABLET ORAL
Status: DISCONTINUED | OUTPATIENT
Start: 2024-12-03 | End: 2024-12-04 | Stop reason: HOSPADM

## 2024-12-03 RX ORDER — DEXAMETHASONE SODIUM PHOSPHATE 4 MG/ML
INJECTION, SOLUTION INTRA-ARTICULAR; INTRALESIONAL; INTRAMUSCULAR; INTRAVENOUS; SOFT TISSUE
Status: DISCONTINUED | OUTPATIENT
Start: 2024-12-03 | End: 2024-12-03 | Stop reason: SDUPTHER

## 2024-12-03 RX ORDER — AMIODARONE HYDROCHLORIDE 200 MG/1
50 TABLET ORAL DAILY
Status: DISCONTINUED | OUTPATIENT
Start: 2024-12-03 | End: 2024-12-04 | Stop reason: HOSPADM

## 2024-12-03 RX ORDER — ONDANSETRON 4 MG/1
4 TABLET, ORALLY DISINTEGRATING ORAL EVERY 8 HOURS PRN
Status: DISCONTINUED | OUTPATIENT
Start: 2024-12-03 | End: 2024-12-04 | Stop reason: HOSPADM

## 2024-12-03 RX ORDER — SODIUM CHLORIDE 0.9 % (FLUSH) 0.9 %
5-40 SYRINGE (ML) INJECTION EVERY 12 HOURS SCHEDULED
Status: DISCONTINUED | OUTPATIENT
Start: 2024-12-03 | End: 2024-12-04 | Stop reason: HOSPADM

## 2024-12-03 RX ORDER — HYDRALAZINE HYDROCHLORIDE 20 MG/ML
10 INJECTION INTRAMUSCULAR; INTRAVENOUS
Status: COMPLETED | OUTPATIENT
Start: 2024-12-03 | End: 2024-12-03

## 2024-12-03 RX ORDER — HYDROMORPHONE HYDROCHLORIDE 2 MG/ML
INJECTION, SOLUTION INTRAMUSCULAR; INTRAVENOUS; SUBCUTANEOUS
Status: DISCONTINUED | OUTPATIENT
Start: 2024-12-03 | End: 2024-12-03 | Stop reason: SDUPTHER

## 2024-12-03 RX ORDER — SODIUM CHLORIDE 9 MG/ML
INJECTION, SOLUTION INTRAVENOUS PRN
Status: DISCONTINUED | OUTPATIENT
Start: 2024-12-03 | End: 2024-12-04 | Stop reason: HOSPADM

## 2024-12-03 RX ORDER — ONDANSETRON 2 MG/ML
4 INJECTION INTRAMUSCULAR; INTRAVENOUS EVERY 6 HOURS PRN
Status: DISCONTINUED | OUTPATIENT
Start: 2024-12-03 | End: 2024-12-04 | Stop reason: HOSPADM

## 2024-12-03 RX ORDER — FENTANYL CITRATE 50 UG/ML
INJECTION, SOLUTION INTRAMUSCULAR; INTRAVENOUS
Status: DISCONTINUED | OUTPATIENT
Start: 2024-12-03 | End: 2024-12-03 | Stop reason: SDUPTHER

## 2024-12-03 RX ORDER — HYDRALAZINE HYDROCHLORIDE 20 MG/ML
10 INJECTION INTRAMUSCULAR; INTRAVENOUS ONCE
Status: DISCONTINUED | OUTPATIENT
Start: 2024-12-03 | End: 2024-12-03 | Stop reason: HOSPADM

## 2024-12-03 RX ORDER — LIDOCAINE HYDROCHLORIDE 20 MG/ML
INJECTION, SOLUTION EPIDURAL; INFILTRATION; INTRACAUDAL; PERINEURAL
Status: DISCONTINUED | OUTPATIENT
Start: 2024-12-03 | End: 2024-12-03 | Stop reason: SDUPTHER

## 2024-12-03 RX ORDER — BISACODYL 5 MG/1
5 TABLET, DELAYED RELEASE ORAL DAILY
Status: DISCONTINUED | OUTPATIENT
Start: 2024-12-03 | End: 2024-12-04 | Stop reason: HOSPADM

## 2024-12-03 RX ORDER — HYDROCHLOROTHIAZIDE 25 MG/1
25 TABLET ORAL
Status: DISCONTINUED | OUTPATIENT
Start: 2024-12-03 | End: 2024-12-03

## 2024-12-03 RX ORDER — HYDROXYZINE HYDROCHLORIDE 10 MG/1
10 TABLET, FILM COATED ORAL EVERY 8 HOURS PRN
Status: DISCONTINUED | OUTPATIENT
Start: 2024-12-03 | End: 2024-12-04 | Stop reason: HOSPADM

## 2024-12-03 RX ADMIN — AMIODARONE HYDROCHLORIDE 50 MG: 200 TABLET ORAL at 17:21

## 2024-12-03 RX ADMIN — DEXAMETHASONE SODIUM PHOSPHATE 4 MG: 4 INJECTION INTRA-ARTICULAR; INTRALESIONAL; INTRAMUSCULAR; INTRAVENOUS; SOFT TISSUE at 10:37

## 2024-12-03 RX ADMIN — PROPOFOL 150 MG: 10 INJECTION, EMULSION INTRAVENOUS at 10:11

## 2024-12-03 RX ADMIN — BISACODYL 5 MG: 5 TABLET, COATED ORAL at 17:22

## 2024-12-03 RX ADMIN — OXYCODONE 5 MG: 5 TABLET ORAL at 19:16

## 2024-12-03 RX ADMIN — HYDROMORPHONE HYDROCHLORIDE 1 MG: 2 INJECTION, SOLUTION INTRAMUSCULAR; INTRAVENOUS; SUBCUTANEOUS at 10:46

## 2024-12-03 RX ADMIN — SUGAMMADEX 200 MG: 100 INJECTION, SOLUTION INTRAVENOUS at 12:34

## 2024-12-03 RX ADMIN — ASPIRIN 81 MG: 81 TABLET, COATED ORAL at 20:53

## 2024-12-03 RX ADMIN — SODIUM CHLORIDE, POTASSIUM CHLORIDE, SODIUM LACTATE AND CALCIUM CHLORIDE: 600; 310; 30; 20 INJECTION, SOLUTION INTRAVENOUS at 09:55

## 2024-12-03 RX ADMIN — FENTANYL CITRATE 50 MCG: 50 INJECTION, SOLUTION INTRAMUSCULAR; INTRAVENOUS at 11:02

## 2024-12-03 RX ADMIN — ROCURONIUM BROMIDE 40 MG: 10 INJECTION, SOLUTION INTRAVENOUS at 10:12

## 2024-12-03 RX ADMIN — FENTANYL CITRATE 50 MCG: 50 INJECTION, SOLUTION INTRAMUSCULAR; INTRAVENOUS at 10:11

## 2024-12-03 RX ADMIN — HYDROMORPHONE HYDROCHLORIDE 0.5 MG: 1 INJECTION, SOLUTION INTRAMUSCULAR; INTRAVENOUS; SUBCUTANEOUS at 13:48

## 2024-12-03 RX ADMIN — HYDROMORPHONE HYDROCHLORIDE 1 MG: 1 INJECTION, SOLUTION INTRAMUSCULAR; INTRAVENOUS; SUBCUTANEOUS at 12:41

## 2024-12-03 RX ADMIN — ROCURONIUM BROMIDE 10 MG: 10 INJECTION, SOLUTION INTRAVENOUS at 11:41

## 2024-12-03 RX ADMIN — LIDOCAINE HYDROCHLORIDE 100 MG: 20 INJECTION, SOLUTION EPIDURAL; INFILTRATION; INTRACAUDAL; PERINEURAL at 10:11

## 2024-12-03 RX ADMIN — WATER 2000 MG: 1 INJECTION INTRAMUSCULAR; INTRAVENOUS; SUBCUTANEOUS at 10:30

## 2024-12-03 RX ADMIN — TRANEXAMIC ACID 1000 MG: 100 INJECTION, SOLUTION INTRAVENOUS at 10:30

## 2024-12-03 RX ADMIN — ALBUMIN (HUMAN) 12.5 G: 12.5 INJECTION, SOLUTION INTRAVENOUS at 11:47

## 2024-12-03 RX ADMIN — ROCURONIUM BROMIDE 20 MG: 10 INJECTION, SOLUTION INTRAVENOUS at 11:04

## 2024-12-03 RX ADMIN — CEFAZOLIN 2000 MG: 1 INJECTION, POWDER, FOR SOLUTION INTRAMUSCULAR; INTRAVENOUS at 17:22

## 2024-12-03 RX ADMIN — HYDRALAZINE HYDROCHLORIDE 10 MG: 20 INJECTION INTRAMUSCULAR; INTRAVENOUS at 13:45

## 2024-12-03 RX ADMIN — LOSARTAN POTASSIUM 50 MG: 50 TABLET, FILM COATED ORAL at 17:23

## 2024-12-03 RX ADMIN — PHENYLEPHRINE HYDROCHLORIDE 40 MCG/MIN: 10 INJECTION INTRAVENOUS at 10:11

## 2024-12-03 RX ADMIN — ROCURONIUM BROMIDE 10 MG: 10 INJECTION, SOLUTION INTRAVENOUS at 10:11

## 2024-12-03 RX ADMIN — POLYETHYLENE GLYCOL 3350 17 G: 17 POWDER, FOR SOLUTION ORAL at 17:23

## 2024-12-03 RX ADMIN — MIDAZOLAM 2 MG: 1 INJECTION INTRAMUSCULAR; INTRAVENOUS at 10:00

## 2024-12-03 RX ADMIN — Medication 50 MG: at 10:38

## 2024-12-03 RX ADMIN — ACETAMINOPHEN 650 MG: 325 TABLET ORAL at 20:54

## 2024-12-03 RX ADMIN — ONDANSETRON 4 MG: 2 INJECTION INTRAMUSCULAR; INTRAVENOUS at 12:34

## 2024-12-03 RX ADMIN — ACETAMINOPHEN 650 MG: 325 TABLET ORAL at 17:21

## 2024-12-03 ASSESSMENT — PAIN SCALES - GENERAL
PAINLEVEL_OUTOF10: 6
PAINLEVEL_OUTOF10: 5
PAINLEVEL_OUTOF10: 1
PAINLEVEL_OUTOF10: 3
PAINLEVEL_OUTOF10: 2

## 2024-12-03 ASSESSMENT — PAIN DESCRIPTION - ORIENTATION
ORIENTATION: LEFT

## 2024-12-03 ASSESSMENT — PAIN DESCRIPTION - LOCATION
LOCATION: HIP
LOCATION: LEG
LOCATION: HIP
LOCATION: HIP

## 2024-12-03 ASSESSMENT — PAIN DESCRIPTION - DESCRIPTORS
DESCRIPTORS: ACHING
DESCRIPTORS: ACHING
DESCRIPTORS: DISCOMFORT

## 2024-12-03 ASSESSMENT — PAIN DESCRIPTION - PAIN TYPE: TYPE: SURGICAL PAIN

## 2024-12-03 ASSESSMENT — PAIN - FUNCTIONAL ASSESSMENT: PAIN_FUNCTIONAL_ASSESSMENT: NONE - DENIES PAIN

## 2024-12-03 NOTE — OP NOTE
[address]  [address]                            OPERATIVE REPORT      PATIENT NAME: ROSELYN CEJA            : 1950  MED REC NO: [id number]                       ROOM: OR  ACCOUNT NO: [id number]                       ADMIT DATE: 2024  PROVIDER: Dmitry Sneed MD    DATE OF SERVICE:  2024    PREOPERATIVE DIAGNOSES:  Failed left total hip.    POSTOPERATIVE DIAGNOSES:  Failed left total hip.    PROCEDURES PERFORMED:  Revision both components, left total hip.    SURGEON:  Dmitry Sneed MD    ASSISTANT:  Dionne Tavera PA-C    ANESTHESIA:  General.    ESTIMATED BLOOD LOSS:  250 mL.    SPECIMENS REMOVED:  None.    INTRAOPERATIVE FINDINGS: Grossly loose acetabular component     COMPLICATIONS:  None.    IMPLANTS:  DePuy size 62 acetabular shell with four 6.5 mm screws and a dual mobility cobalt chrome liner and the femoral side, DePuy dual mobility femur with a 28+ 3.5 mm hip ball.    INDICATIONS:  A 74-year-old man, prior total hip 25 years ago and presented to the office with increasing pain and some mechanical symptoms in his left hip.  Reviewed x-rays and found the patient to have a significant osteolysis with significant polyethylene wear.    DESCRIPTION OF PROCEDURE:  Anesthetic was initiated.  Preoperative dose of antibiotic was given.  The left side was confirmed as the operative side.  Turned in the lateral decubitus position.  Prepped and draped in the usual sterile fashion.  A posterior-lateral exposure was made to the patient's left hip using his old incision and extending in a bit distally.  The ITB and gluteus griffin were opened in line with the skin incision.  Deep retractors were placed.  Posterior-lateral exposure was made to the hip, taking down the old pseudocapsule for repair at the end of the procedure.  No significant fluid within the hip.  Some metallosis was noted.  Trochanter and abductors

## 2024-12-03 NOTE — BRIEF OP NOTE
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 Item Serial No.  Lot No. LRB No. Used Action   CUP ACET [id number] H HIP TI GRIPTION MULT H II VIP TAPR - SNA  CUP ACET [id number] H HIP TI GRIPTION MULT H II VIP TAPR NA [facility] [id number] Left 1 Implanted   SCREW BNE L60MM DIA6.5MM CANC DOME FOR REV CUP SYS PINN - SNA  SCREW BNE L60MM DIA6.5MM CANC DOME FOR REV CUP SYS PINN NA [facility] [id number] Left 1 Implanted   LINER ACET 2 MOBILITY [id number] MM 28X53 MM HIP SHELL PINNACLE - SNA  LINER ACET 2 MOBILITY 62X53 MM 28X53 MM HIP SHELL PINNACLE NA [facility] [id number] Left 1 Implanted   SCREW BNE L50MM DIA6.5MM CANC HIP DOME PINN - SNA  SCREW BNE L50MM DIA6.5MM CANC HIP DOME PINN NA [email or web address] [id number] Left 1 Implanted   SCREW BNE L20MM DIA6.5MM CANC HIP S STL GRIPTION FULL THRD - SNA  SCREW BNE L20MM DIA6.5MM CANC HIP S STL GRIPTION FULL THRD NA [facility] [id number] Left 1 Implanted   SCREW BNE L25MM DIA6.5MM CANC HIP S STL GRIPTION FULL THRD - SNA  SCREW BNE L25MM DIA6.5MM CANC HIP S STL GRIPTION FULL THRD NA [facility] [id number] Left 1 Implanted   SCREW BNE L15MM DIA6.5MM CANC HIP S STL GRIPTION FULL THRD - SNA  SCREW BNE L15MM DIA6.5MM CANC HIP S STL GRIPTION FULL THRD NA [facility]  ORTH[employer]EDICS- [id number] Left 1 Implanted   LINER ACET 28X53 MM ALTRX STRL BI-MENTUM LTX - SNA  LINER ACET 28X53 MM ALTRX STRL BI-MENTUM LTX NA [employer]- [id number] Left 1 Implanted   HEAD FEMERAL COCR 6DEG 28MM +3.5 (S+) - SNA  HEAD FEMERAL COCR 6DEG 28MM +3.5 (S+) NA [employer]- [id number] Left 1 Implanted         Drains: * No LDAs found *    Findings:  Infection Present At Time Of Surgery (PATOS) (choose all levels that have infection present):  No infection present  Other Findings: Completely loose acetabular component.    Electronically signed by Dmitry Sneed MD on 12/3/2024 at 12:30 PM

## 2024-12-03 NOTE — PROGRESS NOTES
Ortho NP Note    POD# 0  s/p LEFT TOTAL HIP REVISION, POSTERIOR APPROACH   Pt seen with wife at bedside.     Pt resting in bed comfortably. Recently arrived to unit.   Currently denies postop hip pain, made aware of prn oxycodone   Reviewed posterior hip precaution and knee immobilizer   Denies nausea.   Postop plan reviewed - No complaints/concerns.    VSS Afebrile.    Visit Vitals  /75   Pulse 81   Temp 98.1 °F (36.7 °C) (Oral)   Resp 16   Ht 1.676 m (5' 6\")   Wt 81.2 kg (179 lb)   SpO2 95%   BMI 28.89 kg/m²       Voiding status: due to void          Labs    Lab Results   Component Value Date/Time    HGB 14.4 11/20/2024 09:56 AM      Lab Results   Component Value Date/Time    INR 1.1 11/20/2024 09:56 AM      Lab Results   Component Value Date/Time     11/20/2024 09:56 AM    K 4.0 11/20/2024 09:56 AM     11/20/2024 09:56 AM    CO2 29 11/20/2024 09:56 AM    BUN 14 11/20/2024 09:56 AM     Recent Glucose Results:   Glucose   Date Value Ref Range Status   11/20/2024 93 65 - 100 mg/dL Final   08/22/2024 92 65 - 100 mg/dL Final   06/10/2024 82 65 - 100 mg/dL Final           Body mass index is 28.89 kg/m². : A BMI > 30 is classified as obesity and > 40 is classified as morbid obesity.       Dressing c.d.I - knee immobilizer in place  Cryotherapy in place over incision  Calves soft and supple; No pain with passive stretch  Sensation and motor intact. +PF/DF/EHL intact   SCDs for mechanical DVT proph while in bed     PLAN:  1) PT BID, OT - WBAT in knee immobilizer. Posterior hip precautions.  2) Resume Plavix and bASA for DVT Prophylaxis and hx Afib. Encouraged early mobilization, bed exercises, and SCD use.  3) Pain control - scheduled tylenol, and prn  oxycodone  .  4) Post op care - Continue bowel regimen, encouraged IS. Straight cath per protocol. Aquacel to remain in place x 7 day unless integrity is lost.    5) Readniess for discharge:     [x] Vital Signs stable    [] Hgb stable    [] + Voiding

## 2024-12-03 NOTE — PERIOP NOTE
TRANSFER - OUT REPORT:    Verbal report given to Barbara (name) on Ander Yepez  being transferred to [facility] (unit) for routine post-op       Report consisted of patient’s Situation, Background, Assessment and   Recommendations(SBAR).     Time Pre op antibiotic given:1030  Anesthesia Stop time: 1257    Information from the following report(s) SBAR, OR Summary, Procedure Summary, Intake/Output, MAR, and Cardiac Rhythm SR  was reviewed with the receiving nurse.    Opportunity for questions and clarification was provided.     Is the patient on 02? No       L/Min RA       Other N/A    Is the patient on a monitor? No    Is the nurse transporting with the patient? No    At transfer, are there Patient Belongings with the patient?  If Yes, please note/list:    Surgical Waiting Area notified of patient's transfer from PACU? Yes

## 2024-12-03 NOTE — H&P
Update History & Physical    The patient's History and Physical of December 2, 2024 was reviewed with the patient and I examined the patient. There was no change. The surgical site was confirmed by the patient and me.       Plan: The risks, benefits, expected outcome, and alternative to the recommended procedure have been discussed with the patient. Patient understands and wants to proceed with the procedure.     Electronically signed by Dmitry Sneed MD on 12/3/2024 at 9:01 AM

## 2024-12-03 NOTE — PLAN OF CARE
Problem: Physical Therapy - Adult  Description: FUNCTIONAL STATUS PRIOR TO ADMISSION: Patient was independent and active without use of DME.  Pt s/p left THR 20 years ago.     HOME SUPPORT PRIOR TO ADMISSION: The patient lived with wife and did not require assistance.    Physical Therapy Goals  Initiated 12/3/2024  1.  Patient will move from supine to sit and sit to supine, scoot up and down, and roll side to side in bed with modified independence within 4 day(s).    2.  Patient will perform sit to stand with modified independence within 4 day(s).  3.  Patient will transfer from bed to chair and chair to bed with modified independence using the least restrictive device within 4 day(s).  4.  Patient will ambulate with modified independence for > 150 feet with the least restrictive device within 4 day(s).   5.  Patient will ascend/descend 4 stairs with one handrail(s) with supervision within 4 day(s).  6.  Patient will perform THR home exercise program per protocol with supervision/set-up within 4 days.  7. Patient will verbalize and demonstrate understanding of posterior hip precautions per protocol within 4 days.     PHYSICAL THERAPY EVALUATION    Patient: Ander Yepez (74 y.o. male)  Date: 12/3/2024  Primary Diagnosis: Failed total hip arthroplasty with dislocation, sequela [T84.028S, Z96.649]  Failed total hip arthroplasty, initial encounter (HCC) [T84.018A, Z96.649]  Procedure(s) (LRB):  LEFT TOTAL HIP REVISION, POSTERIOR APPROACH (Left) Day of Surgery   Precautions: Restrictions/Precautions: Weight Bearing, Fall Risk, Surgical Protocols   Lower Extremity Weight Bearing Restrictions  Left Lower Extremity Weight Bearing: Weight Bearing As Tolerated         Hip Precautions: Posterior hip precautions        ASSESSMENT :   DEFICITS/IMPAIRMENTS:   The patient POD# 0 Left total hip revision -posterior approach  with pain left hip, posterior hip precautions including use of knee immobilizer left leg, decreased  AROM/strength and function left leg, decline in functional mobility, decreased activity tolerance and impaired standing balance/gait with RW.  Pt s/p left THR 01/99.  Pt mobilized easily with no c/o dizziness or nausea.  Anticipate pt will be able to increase amb distance, advance exercise and perform stairs during am session 12/4 and be cleared for early discharge from PT standpoint.     Reviewed and performed supine MARIEL exercise.   Reviewed and instructed in proper positioning to avoid external rotation in supine or reclined position - using blanket roll to support leg in neutral position.  Reviewed importance of  continued use of ice - have staff replace frequently.      Patient will benefit from skilled intervention to address the above impairments.    Functional Outcome Measure:  The patient scored 21/24 on the AM-PAC outcome measure which is indicative of a Cutoff score <=171,2,3 had higher odds of discharging home with home health or need of SNF/IPR.      Pt educated on fall prevention and safety in hospital - Instructed to utilize call button and wait for assistance prior to attempting OOB.      PLAN :  Recommendations and Planned Interventions:   bed mobility training, transfer training, gait training, therapeutic exercises, patient and family training/education, and therapeutic activities    Frequency/Duration: Patient will be followed by physical therapy to address goals, PT Plan of Care: BID to address goals.        Recommendation for discharge: (in order for the patient to meet his/her long term goals):   Intermittent physical therapy up to 2-3x/week in previous living setting    IF patient discharges home will need the following DME: patient owns DME required for discharge                SUBJECTIVE:   Patient stated “I plan to get out of here tomorrow.”    OBJECTIVE DATA SUMMARY:       Past Medical History:   Diagnosis Date    A-fib (HCC)     Arrhythmia 11/2021    Cardioverted     LUIS CARLOS (generalized

## 2024-12-04 VITALS
SYSTOLIC BLOOD PRESSURE: 124 MMHG | RESPIRATION RATE: 16 BRPM | WEIGHT: 179 LBS | TEMPERATURE: 97.9 F | HEIGHT: 66 IN | HEART RATE: 61 BPM | DIASTOLIC BLOOD PRESSURE: 68 MMHG | BODY MASS INDEX: 28.77 KG/M2 | OXYGEN SATURATION: 95 %

## 2024-12-04 LAB
ANION GAP SERPL CALC-SCNC: 4 MMOL/L (ref 2–12)
BUN SERPL-MCNC: 11 MG/DL (ref 6–20)
BUN/CREAT SERPL: 14 (ref 12–20)
CALCIUM SERPL-MCNC: 8.9 MG/DL (ref 8.5–10.1)
CHLORIDE SERPL-SCNC: 104 MMOL/L (ref 97–108)
CO2 SERPL-SCNC: 29 MMOL/L (ref 21–32)
CREAT SERPL-MCNC: 0.77 MG/DL (ref 0.7–1.3)
GLUCOSE SERPL-MCNC: 107 MG/DL (ref 65–100)
HCT VFR BLD AUTO: 37.2 % (ref 36.6–50.3)
HGB BLD-MCNC: 12.2 G/DL (ref 12.1–17)
INR PPP: 1.1 (ref 0.9–1.1)
POTASSIUM SERPL-SCNC: 3.9 MMOL/L (ref 3.5–5.1)
PROTHROMBIN TIME: 11.4 SEC (ref 9–11.1)
SODIUM SERPL-SCNC: 137 MMOL/L (ref 136–145)

## 2024-12-04 PROCEDURE — 97535 SELF CARE MNGMENT TRAINING: CPT

## 2024-12-04 PROCEDURE — 85018 HEMOGLOBIN: CPT

## 2024-12-04 PROCEDURE — 97530 THERAPEUTIC ACTIVITIES: CPT

## 2024-12-04 PROCEDURE — 6360000002 HC RX W HCPCS: Performed by: PHYSICIAN ASSISTANT

## 2024-12-04 PROCEDURE — 85014 HEMATOCRIT: CPT

## 2024-12-04 PROCEDURE — 2580000003 HC RX 258: Performed by: PHYSICIAN ASSISTANT

## 2024-12-04 PROCEDURE — 85610 PROTHROMBIN TIME: CPT

## 2024-12-04 PROCEDURE — 97165 OT EVAL LOW COMPLEX 30 MIN: CPT

## 2024-12-04 PROCEDURE — 6370000000 HC RX 637 (ALT 250 FOR IP): Performed by: PHYSICIAN ASSISTANT

## 2024-12-04 PROCEDURE — 97116 GAIT TRAINING THERAPY: CPT

## 2024-12-04 PROCEDURE — 80048 BASIC METABOLIC PNL TOTAL CA: CPT

## 2024-12-04 PROCEDURE — APPNB60 APP NON BILLABLE TIME 46-60 MINS: Performed by: NURSE PRACTITIONER

## 2024-12-04 PROCEDURE — 6370000000 HC RX 637 (ALT 250 FOR IP): Performed by: NURSE PRACTITIONER

## 2024-12-04 RX ORDER — OXYCODONE HYDROCHLORIDE 5 MG/1
2.5-5 TABLET ORAL EVERY 4 HOURS PRN
Qty: 10 TABLET | Refills: 0 | Status: SHIPPED | OUTPATIENT
Start: 2024-12-04 | End: 2024-12-07

## 2024-12-04 RX ORDER — SENNA AND DOCUSATE SODIUM 50; 8.6 MG/1; MG/1
1 TABLET, FILM COATED ORAL 2 TIMES DAILY
Qty: 60 TABLET | Refills: 0 | Status: SHIPPED | OUTPATIENT
Start: 2024-12-04

## 2024-12-04 RX ADMIN — ACETAMINOPHEN 650 MG: 325 TABLET ORAL at 08:22

## 2024-12-04 RX ADMIN — OXYCODONE 5 MG: 5 TABLET ORAL at 11:25

## 2024-12-04 RX ADMIN — ACETAMINOPHEN 650 MG: 325 TABLET ORAL at 02:50

## 2024-12-04 RX ADMIN — CEFAZOLIN 2000 MG: 1 INJECTION, POWDER, FOR SOLUTION INTRAMUSCULAR; INTRAVENOUS at 02:50

## 2024-12-04 RX ADMIN — LOSARTAN POTASSIUM 50 MG: 50 TABLET, FILM COATED ORAL at 08:24

## 2024-12-04 ASSESSMENT — PAIN DESCRIPTION - LOCATION
LOCATION: HIP

## 2024-12-04 ASSESSMENT — PAIN DESCRIPTION - ORIENTATION
ORIENTATION: RIGHT
ORIENTATION: LEFT
ORIENTATION: LEFT

## 2024-12-04 ASSESSMENT — PAIN SCALES - GENERAL
PAINLEVEL_OUTOF10: 3
PAINLEVEL_OUTOF10: 5
PAINLEVEL_OUTOF10: 3

## 2024-12-04 ASSESSMENT — PAIN DESCRIPTION - DESCRIPTORS
DESCRIPTORS: ACHING
DESCRIPTORS: ACHING

## 2024-12-04 NOTE — PLAN OF CARE
Problem: Occupational Therapy - Adult  Goal: By Discharge: Performs self-care activities at highest level of function for planned discharge setting.  See evaluation for individualized goals.  Description: FUNCTIONAL STATUS PRIOR TO ADMISSION:  Patient was ambulatory using RW   Receives Help From: Family, Prior Level of Assist for ADLs: Independent,  ,  ,  ,  ,  ,  , Ambulation Assistance: Independent, Prior Level of Assist for Transfers: Independent, Active : Yes         HOME SUPPORT: Patient lived with spouse but didn't require assistance.     Occupational Therapy Goals  Initiated 12/4/2024    1. Patient will perform lower body dressing with AE PRN with Modified [place] within 7 day(s).  2. Patient will perform upper body ADLS standing for 5 minutes without fatigue or LOB with Modified [place] within 7 days.  3. Patient will perform toilet transfers with Supervision using Raised Toilet Seat With Handles within 7 days.  4. Patient will perform all aspects of toileting at Modified [place] within 7 days.  5. Patient will utilize energy conservation techniques during functional activities without cues within 7 day(s).  6. Patient will adhere to posterior hip precautions without cues within 7 days.      Outcome: Progressing   OCCUPATIONAL THERAPY EVALUATION    Patient: Ander Yepez (74 y.o. male)  Date: 12/4/2024  Primary Diagnosis: Failed total hip arthroplasty with dislocation, sequela [T84.028S, Z96.649]  Failed total hip arthroplasty, initial encounter ([facility]) [T84.018A, Z96.649]  Procedure(s) (LRB):  LEFT TOTAL HIP REVISION, POSTERIOR APPROACH (Left) 1 Day Post-Op     Precautions: Weight Bearing, Fall Risk, Surgical Protocols   Left Lower Extremity Weight Bearing: Weight Bearing As Tolerated         Hip Precautions: Posterior hip precautions    ASSESSMENT :  The patient is limited by decreased functional mobility, independence in ADLs, high-level IADLs, ROM, strength, body mechanics,  Normal Limits  Orientation Level: Oriented X4  Cognition  Overall Cognitive Status: WNL      Hearing:   Hearing  Hearing: Within functional limits    Vision/Perceptual:                  Range of Motion:   AROM: Within functional limits         Strength:  Strength: Within functional limits      Coordination:  Coordination: Within functional limits            Tone & Sensation:   Tone: Normal  Sensation: Intact          Functional Mobility and Transfers for ADLs:    Bed Mobility:     Bed Mobility Training  Bed Mobility Training: No  Supine to Sit: Modified independent;Adaptive equipment (belt for LE advancement)  Sit to Supine:  (NT, left up in chair)  Scooting: Modified independent    Transfers:      Transfer Training  Transfer Training: Yes  Overall Level of Assistance: Contact-guard assistance;Adaptive equipment;Additional time  Interventions: Verbal cues;Safety awareness training  Sit to Stand: Modified independent;Adaptive equipment  Stand to Sit: Modified independent;Adaptive equipment  Bed to Chair: Contact-guard assistance;Adaptive equipment;Additional time                     Balance:      Balance  Sitting: Intact  Standing: Impaired  Standing - Static: Good;Constant support  Standing - Dynamic: Good;Constant support      ADL Assessment:          Feeding: Independent       Grooming: Contact guard assistance       UE Bathing: Supervision            LE Bathing: Contact guard assistance       UE Dressing: Supervision       LE Dressing: Stand by assistance       Toileting: Contact guard assistance                         ADL Intervention and task modifications:                  [facility] AM-PACTM \"6 Clicks\"                                                       Daily Activity Inpatient Short Form  How much help from another person does the patient currently need... Total; A Lot A Little None   1.  Putting on and taking off regular lower body clothing? []  1 []  2 [x]  3 []  4   2.  Bathing (including

## 2024-12-04 NOTE — PLAN OF CARE
Problem: Physical Therapy - Adult  Goal: By Discharge: Performs mobility at highest level of function for planned discharge setting.  See evaluation for individualized goals.  Description: Problem: Physical Therapy - Adult  Description: FUNCTIONAL STATUS PRIOR TO ADMISSION: Patient was independent and active without use of DME.  Pt s/p left THR 20 years ago.     HOME SUPPORT PRIOR TO ADMISSION: The patient lived with wife and did not require assistance.    Physical Therapy Goals  Initiated 12/3/2024  1.  Patient will move from supine to sit and sit to supine, scoot up and down, and roll side to side in bed with modified independence within 4 day(s).    2.  Patient will perform sit to stand with modified independence within 4 day(s).  3.  Patient will transfer from bed to chair and chair to bed with modified independence using the least restrictive device within 4 day(s).  4.  Patient will ambulate with modified independence for > 150 feet with the least restrictive device within 4 day(s).   5.  Patient will ascend/descend 4 stairs with one handrail(s) with supervision within 4 day(s).  6.  Patient will perform THR home exercise program per protocol with supervision/set-up within 4 days.  7. Patient will verbalize and demonstrate understanding of posterior hip precautions per protocol within 4 days.    Outcome: Progressing       PHYSICAL THERAPY TREATMENT    Patient: Ander Yepez (74 y.o. male)  Date: 12/4/2024  Diagnosis: Failed total hip arthroplasty with dislocation, sequela [T84.028S, Z96.649]  Failed total hip arthroplasty, initial encounter ([facility]) [T84.018A, Z96.649] Failed total hip arthroplasty with dislocation, sequela  Procedure(s) (LRB):  LEFT TOTAL HIP REVISION, POSTERIOR APPROACH (Left) 1 Day Post-Op  Precautions: Weight Bearing, Fall Risk, Surgical Protocols   Left Lower Extremity Weight Bearing: Weight Bearing As Tolerated         Hip Precautions: Posterior hip precautions

## 2024-12-04 NOTE — DISCHARGE SUMMARY
Ortho Discharge Summary    Patient ID:  Ander Yepez  [id number]  male  74 y.o.  1950    Admit date: 12/3/2024    Discharge date: 12/4/2024    Admitting Physician: Dmitry Sneed MD     Consulting Physician(s):   Treatment Team:   Dmitry Sneed MD Dobzyniak, Matthew, MD Mansaray, [place], Esther Hernandez, Vamshi Gustafson, Alejandra Morocho RN    Date of Surgery:   12/3/2024     Preoperative Diagnosis:  Failed total hip arthroplasty with dislocation, sequela [T84.028S, Z96.649]    Postoperative Diagnosis:   * No post-op diagnosis entered *    Procedure(s):   LEFT TOTAL HIP REVISION, POSTERIOR APPROACH     Anesthesia Type:   General     Surgeon: Dmitry Sneed MD                            HPI:  Pt is a 74 y.o. male who has a history of Failed total hip arthroplasty with dislocation, sequela [T84.028S, Z96.649]  with pain and limitations of activities of daily living who presents at this time for a LEFT TOTAL HIP REVISION, POSTERIOR APPROACH following the failure of conservative management.    PMH:   Past Medical History:   Diagnosis Date    A-fib (HCC)     Arrhythmia 11/2021    Cardioverted     LUIS CARLOS (generalized anxiety disorder) 12/9/2019    RESOLVED    Hypertension     OA (osteoarthritis) 8/19/2011    Sleep apnea     CPAP       Body mass index is 28.89 kg/m². : A BMI > 30 is classified as obesity and > 40 is classified as morbid obesity.     Medications upon admission :   Prior to Admission Medications   Prescriptions Last Dose Informant Patient Reported? Taking?   Multiple Vitamins-Minerals (THERAPEUTIC MULTIVITAMIN-MINERALS) tablet 11/19/2024  Yes No   Sig: Take 1 tablet by mouth daily   PACERONE 100 MG tablet 12/2/2024  No Yes   Sig: Take 1/2 (one-half) tablet by mouth once daily   amoxicillin (AMOXIL) 500 MG tablet Unknown  No No   Sig: Take 2 tablets 1 hour before dental procedure and 2 tablets 1 hour after   aspirin 81 MG EC tablet 12/1/2024  No Yes   Sig: Take 1 tablet

## 2024-12-04 NOTE — ANESTHESIA POSTPROCEDURE EVALUATION
patient is stable and ready to be discharged from PACU .    Signed By: Son Covarrubias MD    12/4/2024    Multimodal analgesia pain management approach  Pain management: satisfactory to patient    No notable events documented.

## 2024-12-04 NOTE — CARE COORDINATION
Care Management Initial Assessment       RUR:7%  Readmission? No  1st IM letter given? Yes 12/3/24  1st  letter given: No    Disposition-Home with wife  Transportation -by wife    Patient accepted by [facility]    Awaiting delivery of walker to patient's room    Home Health referral sent to [facility] via [facility]    Rolling walker referral sent via [place] from [employer].    CM met with patient to discuss discharge planning and to offer a choice of HHC. Patient chose [facility]. He lives with his wife in their private residence. He was independent without any assistive devices prior to surgery. Patient verified his demographic information and did not voice any discharge barriers.     SO Noriega MSA, RN, CM     12/04/24 0944   Service Assessment   Patient Orientation Alert and Oriented   Cognition Alert   History Provided By Patient   Primary Caregiver Self   Support Systems Spouse/Significant Other   PCP Verified by CM Yes   Last Visit to PCP Within last 3 months   Prior Functional Level Independent in ADLs/IADLs   Current Functional Level Assistance with the following:;Cooking;Housework;Shopping;Mobility;Bathing;Dressing   Can patient return to prior living arrangement Yes   Ability to make needs known: Good   Family able to assist with home care needs: Yes   Would you like for me to discuss the discharge plan with any other family members/significant others, and if so, who? Yes   Financial Resources Medicare   CM/ Referral Other (see comment)  (Home Health)   Social/Functional History   Lives With Spouse   Type of Home House   Home Layout One level   Home Access Stairs to enter without rails   Entrance Stairs - Number of Steps 4   Bathroom Shower/Tub Walk-in shower   Bathroom Equipment Built-in shower seat   Bathroom Accessibility Walker accessible   Receives Help From Family   Active  Yes   Mode of Transportation Car   Occupation Retired   Discharge Planning   Type of Residence House   Living

## 2024-12-04 NOTE — PROGRESS NOTES
Orthopaedics Daily Progress Note                            Date of Surgery:  [unfilled]      Patient: Ander Yepez   YOB: 1950  Age: 74 y.o.      SUBJECTIVE:   1 Day Post-Op following LEFT TOTAL HIP REVISION, POSTERIOR APPROACH.      The patient's post operative pain is well controlled.  No CP/SOB.  No N/V. The patient's mobility will be evaluated today during PT sessions.    OBJECTIVE:     Vital Signs:    /63   Pulse 57   Temp 97.9 °F (36.6 °C) (Oral)   Resp 16   Ht 1.676 m (5' 6\")   Wt 81.2 kg (179 lb)   SpO2 95%   BMI 28.89 kg/m²     Physical Exam:  General: A&Ox3. The patient is cooperative, and in no acute distress.    Respiratory: Respirations are unlabored.  Surgical site(s): dressing clean, dry  Musculoskeletal: Calves are soft, supple, and non-tender upon palpation.  Motor 5/5.  Neurological:  Neurovascularly intact with good dorsi and plantar flexion.    Pulses symmetrical.    Laboratory Values:             No results found for this or any previous visit (from the past 12 hour(s)).      PLAN:     S/P LEFT TOTAL HIP REVISION, POSTERIOR APPROACH -Continue WBAT in knee immobilizer )posterior hip precautions)  -Mobilize and continue with PT/OT until discharged     Hemodynamics  Acute blood loss anemia as expected. Patient asymptomatic.  Continue to monitor.     Wound Monitor postop dressing; no postop dressing changes necessary.  Reinforce PRN.     Post Operative Pain Pain Control: stable, mild-to-moderate joint symptoms intermittently, reasonably well controlled by current meds.     DVT Prophylaxis Continue with SCD'S, Ankle Pump Exercises. ASA 325mg BID     Discharge Disposition Discharge plan: Home pending PT clearance.       Signed By: Dionne Tavera PA-C  December 4, 2024 7:53 AM

## 2024-12-06 ENCOUNTER — TELEPHONE (OUTPATIENT)
Age: 74
End: 2024-12-06

## 2024-12-06 NOTE — TELEPHONE ENCOUNTER
Care Transitions Initial Follow Up Call    Outreach made within 2 business days of discharge: Yes    Patient: Ander Yepez Patient : 1950   MRN: [id number]  Reason for Admission:  Failed total hip arthroplasty with dislocation, sequela.  Discharge Date: 24       Spoke with: patient.    Discharge department/facility: [facility].    TCM Interactive Patient Contact:  Was patient able to fill all prescriptions: Yes  Was patient instructed to bring all medications to the follow-up visit: Yes  Is patient taking all medications as directed in the discharge summary? Yes  Does patient understand their discharge instructions: Yes  Does patient have questions or concerns that need addressed prior to 7-14 day follow up office visit: no    Additional needs identified to be addressed with provider  No needs identified             Scheduled appointment with PCP within 7-14 days    Follow Up  Future Appointments   Date Time Provider Department Center   2024  3:00 PM Florian Lees MD [facility] DEP   2025 10:40 AM Zechariah Lim MD [facility]  AMB   1/3/2025 11:00 AM Dionne Tavera PA-C [facility] AMB   2025  9:00 AM Florian Lees MD [facility] DEP   2025 10:40 AM Flora Allen MD [facility]       NAINA WRIGHT LPN

## 2024-12-09 NOTE — PROGRESS NOTES
Physician Progress Note      PATIENT:               ROSELYN CEJA  [facility] #:                  [id number]  :                       1950  ADMIT DATE:       12/3/2024 8:56 AM  DISCH DATE:        2024 2:10 PM  RESPONDING  PROVIDER #:        Dionne Fontenot PA-C          QUERY TEXT:    Patient admitted with Failed left total hip arthroplasty. Noted documentation   of Acute blood loss anemia in ortho progress note 2024. In order to   support the diagnosis of Acute blood loss anemia, please include additional   clinical indicators in your documentation.  Or please document if the   diagnosis of Acute blood loss anemia has been ruled out after further study.    The medical record reflects the following:    Risk Factors:  s/p arthroplasty revision, age 74, estimated blood loss 250    Clinical Indicators: ortho progress note 2024 Hemodynamics  Acute blood   loss anemia as expected. Patient asymptomatic.  Continue to monitor.    Estimated Blood Loss (mL): 250 mL    HCT- 37.2  HGB - 12.2    Treatment: serial labs,    Thank you    Nohemy Olivera [facility], CDS  Options provided:  -- acute blood loss anemia present as evidenced by, Please document evidence.  -- acute blood loss anemia was ruled out  -- Other - I will add my own diagnosis  -- Disagree - Not applicable / Not valid  -- Disagree - Clinically unable to determine / Unknown  -- Refer to Clinical Documentation Reviewer    PROVIDER RESPONSE TEXT:    acute blood loss anemia was ruled out after study.    Query created by: Nohemy Olivera on 2024 8:45 PM      Electronically signed by:  Dionne Fontenot PA-C 2024 5:30 PM

## 2024-12-16 ENCOUNTER — PATIENT MESSAGE (OUTPATIENT)
Age: 74
End: 2024-12-16

## 2024-12-16 DIAGNOSIS — Z79.899 ON AMIODARONE THERAPY: Primary | ICD-10-CM

## 2024-12-19 ENCOUNTER — OFFICE VISIT (OUTPATIENT)
Age: 74
End: 2024-12-19
Payer: MEDICARE

## 2024-12-19 VITALS
TEMPERATURE: 98.1 F | HEART RATE: 62 BPM | HEIGHT: 66 IN | SYSTOLIC BLOOD PRESSURE: 113 MMHG | DIASTOLIC BLOOD PRESSURE: 81 MMHG | RESPIRATION RATE: 16 BRPM | OXYGEN SATURATION: 99 % | BODY MASS INDEX: 29.35 KG/M2 | WEIGHT: 182.6 LBS

## 2024-12-19 DIAGNOSIS — T81.49XA WOUND INFECTION AFTER SURGERY: ICD-10-CM

## 2024-12-19 DIAGNOSIS — Z79.899 ON AMIODARONE THERAPY: ICD-10-CM

## 2024-12-19 DIAGNOSIS — Z09 HOSPITAL DISCHARGE FOLLOW-UP: Primary | ICD-10-CM

## 2024-12-19 LAB
ALBUMIN SERPL-MCNC: 3.6 G/DL (ref 3.5–5)
ALBUMIN/GLOB SERPL: 1.2 (ref 1.1–2.2)
ALP SERPL-CCNC: 80 U/L (ref 45–117)
ALT SERPL-CCNC: 18 U/L (ref 12–78)
AST SERPL-CCNC: 22 U/L (ref 15–37)
BILIRUB DIRECT SERPL-MCNC: 0.2 MG/DL (ref 0–0.2)
BILIRUB SERPL-MCNC: 0.6 MG/DL (ref 0.2–1)
GLOBULIN SER CALC-MCNC: 2.9 G/DL (ref 2–4)
PROT SERPL-MCNC: 6.5 G/DL (ref 6.4–8.2)
TSH SERPL DL<=0.05 MIU/L-ACNC: 2.13 UIU/ML (ref 0.36–3.74)

## 2024-12-19 PROCEDURE — 99213 OFFICE O/P EST LOW 20 MIN: CPT | Performed by: INTERNAL MEDICINE

## 2024-12-19 RX ORDER — CEPHALEXIN 500 MG/1
500 CAPSULE ORAL 3 TIMES DAILY
Qty: 21 CAPSULE | Refills: 0 | Status: SHIPPED | OUTPATIENT
Start: 2024-12-19 | End: 2024-12-26

## 2024-12-19 NOTE — PROGRESS NOTES
(Temporal)   Resp 16   Ht 1.676 m (5' 6\")   Wt 82.8 kg (182 lb 9.6 oz)   SpO2 99%   BMI 29.47 kg/m²   General Appearance: alert and oriented to person, place and time, well-developed and well-nourished, in no acute distress  Pulmonary/Chest: clear to auscultation bilaterally- no wheezes, rales or rhonchi, normal air movement, no respiratory distress  Cardiovascular: normal rate, normal S1 and S2, no gallops, intact distal pulses, and no carotid bruits  Abdomen: soft, non-tender, non-distended, normal bowel sounds, no masses or organomegaly  Please see above photograph of wound.  Some erythema along the inferior aspect with some mild tenderness.  No fluctuance.      An electronic signature was used to authenticate this note.  --Florian Lees MD

## 2025-01-02 ENCOUNTER — OFFICE VISIT (OUTPATIENT)
Age: 75
End: 2025-01-02

## 2025-01-02 VITALS
DIASTOLIC BLOOD PRESSURE: 68 MMHG | SYSTOLIC BLOOD PRESSURE: 126 MMHG | WEIGHT: 185.5 LBS | HEART RATE: 51 BPM | OXYGEN SATURATION: 98 % | HEIGHT: 66 IN | BODY MASS INDEX: 29.81 KG/M2

## 2025-01-02 DIAGNOSIS — I48.0 PAROXYSMAL ATRIAL FIBRILLATION (HCC): ICD-10-CM

## 2025-01-02 DIAGNOSIS — I48.91 FLUTTER-FIBRILLATION (HCC): ICD-10-CM

## 2025-01-02 DIAGNOSIS — I48.92 FLUTTER-FIBRILLATION (HCC): ICD-10-CM

## 2025-01-02 DIAGNOSIS — I47.19 ATRIAL TACHYCARDIA (HCC): ICD-10-CM

## 2025-01-02 DIAGNOSIS — Z79.899 ON AMIODARONE THERAPY: Primary | ICD-10-CM

## 2025-01-02 NOTE — PROGRESS NOTES
[facility]                                     DIVISION OF CARDIOLOGY                                                                             OFFICE NOTE                  REBECCA MARLOW M.D. , ROBERT            ROSELYN CEJA   1950  [id number]    Date/Time:  1/2/202511:22 AM      /68 (Site: Left Upper Arm, Position: Sitting, Cuff Size: Large Adult)   Pulse 51   Ht 1.676 m (5' 6\")   Wt 84.1 kg (185 lb 8 oz)   SpO2 98%   BMI 29.94 kg/m²        Wt Readings from Last 3 Encounters:   01/02/25 84.1 kg (185 lb 8 oz)   12/19/24 82.8 kg (182 lb 9.6 oz)   12/03/24 81.2 kg (179 lb)          Lab Results   Component Value Date    CHOL 163 06/10/2024    TRIG 60 06/10/2024    HDL 67 06/10/2024    VLDL 12 06/10/2024    CHOLHDLRATIO 2.4 06/10/2024              SUBJECTIVE:  Doing well after hip revision in December 2024.  He denies any chest pain or shortness of breath or palpitations.       Assessment/Plan    1.  Paroxysmal atrial fibrillation: He remains in normal sinus rhythm     His EKG today reveals a sinus bradycardia rate of 54 essentially normal intervals and no significant ST-T changes.  No changes from previous     Status post MARCUS cardioversion 5/8/2022 with restoration of normal sinus rhythm.     To be noted the patient did have a very slow heart rate approximately 30 bpm soon after cardioversion.     Continue with half of the dose of amiodarone.       The patient is now off metoprolol completely.     Status post Watchman procedure in August 2024 on aspirin alone at this time.     Discussed the potential side effects of amiodarone.     Okay liver function test and TSH  in 12/24 repeating 6-month     Lab Results   Component Value Date    ALT 18 12/19/2024    AST 22 12/19/2024    ALKPHOS 80 12/19/2024    BILITOT 0.6 12/19/2024     Lab Results   Component Value Date    TSH 2.13 12/19/2024             CXR ok on 1/24 repeat January 2025     We have discussed the potential

## 2025-01-02 NOTE — PATIENT INSTRUCTIONS
Have chest x-ray done in the next couple of weeks. Our office will call you with results.      Schedule follow up with Dr. Lim in 6 months. Have labs obtained, 1 week prior to follow up.

## 2025-01-08 ENCOUNTER — HOSPITAL ENCOUNTER (OUTPATIENT)
Age: 75
Discharge: HOME | End: 2025-01-11
Payer: MEDICARE

## 2025-01-08 DIAGNOSIS — Z79.899 ON AMIODARONE THERAPY: ICD-10-CM

## 2025-01-08 PROCEDURE — 71046 X-RAY EXAM CHEST 2 VIEWS: CPT

## 2025-01-13 ENCOUNTER — TELEPHONE (OUTPATIENT)
Age: 75
End: 2025-01-13

## 2025-01-13 RX ORDER — HYDROCHLOROTHIAZIDE 25 MG/1
25 TABLET ORAL
Qty: 45 TABLET | Refills: 0 | Status: SHIPPED | OUTPATIENT
Start: 2025-01-13 | End: 2025-04-13

## 2025-01-13 RX ORDER — LOSARTAN POTASSIUM 50 MG/1
50 TABLET ORAL DAILY
Qty: 90 TABLET | Refills: 1 | Status: SHIPPED | OUTPATIENT
Start: 2025-01-13

## 2025-01-13 RX ORDER — AMIODARONE HYDROCHLORIDE 100 MG/1
50 TABLET ORAL DAILY
Qty: 45 TABLET | Refills: 1 | Status: SHIPPED | OUTPATIENT
Start: 2025-01-13

## 2025-01-13 NOTE — TELEPHONE ENCOUNTER
Reason for call:  TC from pt. Pt id verified by two identifiers. Pt states his pharmacy has changed to:   [facility] (Now [facility]) - [place], [place] - [address] - P [phone] - F [phone]. Pt's pharmacy has been updated in his chart. Pt request that his two local pharmacies, [facility] and [facility], stay incase he may need a short script sent in from time to time. Pt's two local pharmacies left in his chart.

## 2025-01-13 NOTE — TELEPHONE ENCOUNTER
TC to pt, ID verified. Advised of normal xray per Dr. Marques. Pt also requested prescriptions Amiodarone and Losartan be sent to Mail order pharmacy, [facility]. RX sent as requested. No other questions.    Cardiologist: Dr. Lim    Future Appointments   Date Time Provider Department Center   3/4/2025  3:00 PM Dmitry Sneed MD TOS BS AMB   6/30/2025  9:00 AM [facility]   7/17/2025 10:20 AM Zechariah Lim MD CAV  AMB   8/27/2025 10:40 AM [facility]       Requested Prescriptions     Signed Prescriptions Disp Refills    amiodarone (PACERONE) 100 MG tablet 45 tablet 1     Sig: Take 0.5 tablets by mouth daily     Authorizing Provider: ZECHARIAH LIM     Ordering User: GILLES PRATT    losartan (COZAAR) 50 MG tablet 90 tablet 1     Sig: Take 1 tablet by mouth daily     Authorizing Provider: ZECHARIAH LIM     Ordering User: GILLES PRATT         Refills VO per Dr. Lim.

## 2025-01-13 NOTE — TELEPHONE ENCOUNTER
Chief Complaint   Patient presents with    NEW PHARMACY - [facility]     Last Appointment with Dr. Florian Lees:  12/19/2024   Future Appointments   Date Time Provider Department Center   3/4/2025  3:00 PM Dmitry Sneed MD TOSM BS AMB   6/30/2025  9:00 AM Florian Lees MD [facility] DEP   7/17/2025 10:20 AM Zechariah Lim MD CAV BS AMB   8/27/2025 10:40 AM Flora Allen MD CAV[facility] BS AMB

## 2025-01-13 NOTE — TELEPHONE ENCOUNTER
----- Message from Dr. Zechariah Lim MD sent at 1/13/2025  9:18 AM EST -----  Regarding: RE: chest x-ray  Essentially normal chest x-ray  ----- Message -----  From: Gabi Elder RN  Sent: 1/13/2025   9:02 AM EST  To: Zechariah Lim MD; Sudha Li RN  Subject: FW: chest x-ray                                  Chest x-ray results available. Gabi Ramos  ----- Message -----  From: Gabi Elder RN  Sent: 1/2/2025  11:45 AM EST  To: Gabi Elder RN; Sudha Li RN  Subject: chest x-ray                                      Call with chest x-ray results.

## 2025-01-13 NOTE — TELEPHONE ENCOUNTER
Reason for call:  Spoke with pt. Pt requested a refill fro med   hydroCHLOROthiazide (HYDRODIURIL) 25 MG tablet     send to     [facility] (Now [facility]) - [place], [place] - [address] - P [phone] - F [phone]  [phone]     Is this a new problem: Yes    Date of last appointment:  12/19/2024     Can we respond via [employer]: No    Best call back number: Ander Yepez

## 2025-01-21 NOTE — PATIENT INSTRUCTIONS
Follow up with Stefanie Nicole in 1 year Lot # (Optional): [id number] Performed By: Janki Detail Level: None Expiration Date (Optional): 2026-03-26 Urine Pregnancy Test Result: negative

## 2025-02-10 ENCOUNTER — HOSPITAL ENCOUNTER (OUTPATIENT)
Age: 75
Discharge: HOME | End: 2025-02-13
Payer: MEDICARE

## 2025-02-10 DIAGNOSIS — M19.012 PRIMARY OSTEOARTHRITIS, LEFT SHOULDER: ICD-10-CM

## 2025-02-10 PROCEDURE — 73200 CT UPPER EXTREMITY W/O DYE: CPT

## 2025-02-13 ENCOUNTER — TELEPHONE (OUTPATIENT)
Age: 75
End: 2025-02-13

## 2025-02-13 NOTE — TELEPHONE ENCOUNTER
Per Dr. Lim:    The patient is at an acceptable cardiac risk to proceed with shoulder surgery. Continue same medication with no interruptions,  but may hold off aspirin 5 days prior     Faxed the above to Dr. Fe Robledo @ [facility] @ [phone]. Confirmation received.

## 2025-03-19 ENCOUNTER — OFFICE VISIT (OUTPATIENT)
Age: 75
End: 2025-03-19
Payer: MEDICARE

## 2025-03-19 VITALS
WEIGHT: 179.6 LBS | TEMPERATURE: 98.1 F | OXYGEN SATURATION: 96 % | HEIGHT: 66 IN | DIASTOLIC BLOOD PRESSURE: 84 MMHG | HEART RATE: 52 BPM | BODY MASS INDEX: 28.87 KG/M2 | SYSTOLIC BLOOD PRESSURE: 128 MMHG | RESPIRATION RATE: 15 BRPM

## 2025-03-19 DIAGNOSIS — I47.19 ATRIAL TACHYCARDIA: ICD-10-CM

## 2025-03-19 DIAGNOSIS — Z01.818 PREOP EXAM FOR INTERNAL MEDICINE: ICD-10-CM

## 2025-03-19 DIAGNOSIS — R73.01 IMPAIRED FASTING GLUCOSE: ICD-10-CM

## 2025-03-19 DIAGNOSIS — I10 PRIMARY HYPERTENSION: ICD-10-CM

## 2025-03-19 DIAGNOSIS — M19.012 ARTHRITIS OF LEFT SHOULDER REGION: Primary | ICD-10-CM

## 2025-03-19 PROCEDURE — 3079F DIAST BP 80-89 MM HG: CPT | Performed by: INTERNAL MEDICINE

## 2025-03-19 PROCEDURE — G8417 CALC BMI ABV UP PARAM F/U: HCPCS | Performed by: INTERNAL MEDICINE

## 2025-03-19 PROCEDURE — 3074F SYST BP LT 130 MM HG: CPT | Performed by: INTERNAL MEDICINE

## 2025-03-19 PROCEDURE — 99214 OFFICE O/P EST MOD 30 MIN: CPT | Performed by: INTERNAL MEDICINE

## 2025-03-19 PROCEDURE — 1123F ACP DISCUSS/DSCN MKR DOCD: CPT | Performed by: INTERNAL MEDICINE

## 2025-03-19 PROCEDURE — 1126F AMNT PAIN NOTED NONE PRSNT: CPT | Performed by: INTERNAL MEDICINE

## 2025-03-19 PROCEDURE — 1036F TOBACCO NON-USER: CPT | Performed by: INTERNAL MEDICINE

## 2025-03-19 PROCEDURE — G8427 DOCREV CUR MEDS BY ELIG CLIN: HCPCS | Performed by: INTERNAL MEDICINE

## 2025-03-19 PROCEDURE — 1159F MED LIST DOCD IN RCRD: CPT | Performed by: INTERNAL MEDICINE

## 2025-03-19 SDOH — ECONOMIC STABILITY: FOOD INSECURITY: WITHIN THE PAST 12 MONTHS, THE FOOD YOU BOUGHT JUST DIDN'T LAST AND YOU DIDN'T HAVE MONEY TO GET MORE.: NEVER TRUE

## 2025-03-19 SDOH — ECONOMIC STABILITY: FOOD INSECURITY: WITHIN THE PAST 12 MONTHS, YOU WORRIED THAT YOUR FOOD WOULD RUN OUT BEFORE YOU GOT MONEY TO BUY MORE.: NEVER TRUE

## 2025-03-19 ASSESSMENT — PATIENT HEALTH QUESTIONNAIRE - PHQ9
1. LITTLE INTEREST OR PLEASURE IN DOING THINGS: NOT AT ALL
2. FEELING DOWN, DEPRESSED OR HOPELESS: NOT AT ALL
SUM OF ALL RESPONSES TO PHQ QUESTIONS 1-9: 0

## 2025-03-19 NOTE — PROGRESS NOTES
Date of Exam: 3/19/2025    Ander Yepez is a 74 y.o. male (:1950) who presents for preoperative evaluation.   Procedure/Surgery:Left Shoulder Replacement  Date of Procedure/Surgery: 2025  Surgeon: Jenna Abarca/Surgical Facility: [facility]  Primary Physician: Florian Lees MD  Latex Allergy: No    Current Outpatient Medications   Medication Sig Dispense Refill    amiodarone (PACERONE) 100 MG tablet Take 0.5 tablets by mouth daily 45 tablet 1    losartan (COZAAR) 50 MG tablet Take 1 tablet by mouth daily 90 tablet 1    hydroCHLOROthiazide (HYDRODIURIL) 25 MG tablet Take 1 tablet by mouth every 48 hours 45 tablet 0    cetirizine (ZYRTEC) 10 MG tablet TAKE 1 TABLET BY MOUTH EVERY EVENING 90 tablet 3    aspirin 81 MG EC tablet Take 1 tablet by mouth daily 90 tablet 3    amoxicillin (AMOXIL) 500 MG tablet Take 2 tablets 1 hour before dental procedure and 2 tablets 1 hour after 4 tablet 4    Multiple Vitamins-Minerals (THERAPEUTIC MULTIVITAMIN-MINERALS) tablet Take 1 tablet by mouth daily      b complex vitamins capsule Take 1 capsule by mouth daily 30 capsule 3     No current facility-administered medications for this visit.        Past Medical History:   Diagnosis Date    A-fib (HCC)     Arrhythmia 2021    Cardioverted     LUIS CARLOS (generalized anxiety disorder) 2019    RESOLVED    Hypertension     OA (osteoarthritis) 2011    Sleep apnea     CPAP        Past Surgical History:   Procedure Laterality Date    CARDIAC PROCEDURE N/A 2024    Tavon during cath case performed by Flora Allen MD at [facility] CARDIAC CATH LAB    CARDIAC PROCEDURE N/A 2024    Intracardiac echocardiogram performed by Flora Allen MD at [facility] CARDIAC CATH LAB    CARDIAC SURGERY      Watchman    CATARACT REMOVAL Left     left     CATARACT REMOVAL Right     COLONOSCOPY      ENDOSCOPY, COLON, DIAGNOSTIC      EP DEVICE PROCEDURE N/A 2024    Watchman tyrone closure device

## 2025-03-26 RX ORDER — HYDROCHLOROTHIAZIDE 25 MG/1
25 TABLET ORAL
Qty: 45 TABLET | Refills: 1 | Status: SHIPPED | OUTPATIENT
Start: 2025-03-26 | End: 2025-06-24

## 2025-03-26 NOTE — TELEPHONE ENCOUNTER
Chief Complaint   Patient presents with    Medication Refill     Last Appointment with Florian Lees MD:  3/19/2025   Future Appointments   Date Time Provider Department Center   6/30/2025  9:00 AM Florian Lees MD [facility]   7/17/2025 10:20 AM Zechariah Lim MD C[facility]   8/27/2025 10:40 AM Flora Allen MD [facility] BS AMB

## 2025-06-13 RX ORDER — LOSARTAN POTASSIUM 50 MG/1
50 TABLET ORAL DAILY
Qty: 90 TABLET | Refills: 1 | Status: SHIPPED | OUTPATIENT
Start: 2025-06-13

## 2025-06-13 RX ORDER — AMIODARONE HYDROCHLORIDE 100 MG/1
50 TABLET ORAL DAILY
Qty: 45 TABLET | Refills: 1 | Status: SHIPPED | OUTPATIENT
Start: 2025-06-13

## 2025-06-13 NOTE — TELEPHONE ENCOUNTER
Requested Prescriptions     Signed Prescriptions Disp Refills    amiodarone (PACERONE) 100 MG tablet 45 tablet 1     Sig: TAKE 1/2 TABLET EVERY DAY     Authorizing Provider: ZECHARIAH LIM     Ordering User: CRISTINO RAINEY    losartan (COZAAR) 50 MG tablet 90 tablet 1     Sig: TAKE 1 TABLET EVERY DAY     Authorizing Provider: ZECHARIAH LIM     Ordering User: CRISTINO RAINEY     Verbal order per Dr. Lim.     Future Appointments   Date Time Provider Department Center   6/30/2025  9:00 AM Florian Lees MD [facility]   7/2/2025  9:00 AM Fe West MD [facility] BS [facility]   7/17/2025 10:20 AM Zechariah Lim MD CAV [facility]   8/27/2025 10:40 AM [facility]

## 2025-06-25 ENCOUNTER — TELEPHONE (OUTPATIENT)
Age: 75
End: 2025-06-25

## 2025-06-25 SDOH — HEALTH STABILITY: PHYSICAL HEALTH: ON AVERAGE, HOW MANY DAYS PER WEEK DO YOU ENGAGE IN MODERATE TO STRENUOUS EXERCISE (LIKE A BRISK WALK)?: 4 DAYS

## 2025-06-25 ASSESSMENT — LIFESTYLE VARIABLES
HOW OFTEN DO YOU HAVE SIX OR MORE DRINKS ON ONE OCCASION: 1
HOW OFTEN DO YOU HAVE A DRINK CONTAINING ALCOHOL: 2-3 TIMES A WEEK
HOW OFTEN DO YOU HAVE A DRINK CONTAINING ALCOHOL: 4
HOW MANY STANDARD DRINKS CONTAINING ALCOHOL DO YOU HAVE ON A TYPICAL DAY: 1 OR 2
HOW MANY STANDARD DRINKS CONTAINING ALCOHOL DO YOU HAVE ON A TYPICAL DAY: 1

## 2025-06-25 ASSESSMENT — PATIENT HEALTH QUESTIONNAIRE - PHQ9
SUM OF ALL RESPONSES TO PHQ QUESTIONS 1-9: 0
1. LITTLE INTEREST OR PLEASURE IN DOING THINGS: NOT AT ALL
SUM OF ALL RESPONSES TO PHQ QUESTIONS 1-9: 0
2. FEELING DOWN, DEPRESSED OR HOPELESS: NOT AT ALL
SUM OF ALL RESPONSES TO PHQ QUESTIONS 1-9: 0
SUM OF ALL RESPONSES TO PHQ QUESTIONS 1-9: 0

## 2025-06-30 ENCOUNTER — OFFICE VISIT (OUTPATIENT)
Age: 75
End: 2025-06-30
Payer: MEDICARE

## 2025-06-30 VITALS
OXYGEN SATURATION: 99 % | SYSTOLIC BLOOD PRESSURE: 124 MMHG | TEMPERATURE: 98.1 F | RESPIRATION RATE: 16 BRPM | HEART RATE: 59 BPM | HEIGHT: 66 IN | WEIGHT: 185 LBS | BODY MASS INDEX: 29.73 KG/M2 | DIASTOLIC BLOOD PRESSURE: 82 MMHG

## 2025-06-30 DIAGNOSIS — R73.01 IMPAIRED FASTING GLUCOSE: ICD-10-CM

## 2025-06-30 DIAGNOSIS — E78.2 MIXED HYPERLIPIDEMIA: ICD-10-CM

## 2025-06-30 DIAGNOSIS — Z12.5 ENCOUNTER FOR SCREENING FOR MALIGNANT NEOPLASM OF PROSTATE: ICD-10-CM

## 2025-06-30 DIAGNOSIS — I47.19 ATRIAL TACHYCARDIA: ICD-10-CM

## 2025-06-30 DIAGNOSIS — I10 PRIMARY HYPERTENSION: ICD-10-CM

## 2025-06-30 DIAGNOSIS — Z00.00 MEDICARE ANNUAL WELLNESS VISIT, SUBSEQUENT: Primary | ICD-10-CM

## 2025-06-30 DIAGNOSIS — M19.011 LOCALIZED OSTEOARTHRITIS OF RIGHT SHOULDER: ICD-10-CM

## 2025-06-30 PROCEDURE — 3074F SYST BP LT 130 MM HG: CPT | Performed by: INTERNAL MEDICINE

## 2025-06-30 PROCEDURE — 1123F ACP DISCUSS/DSCN MKR DOCD: CPT | Performed by: INTERNAL MEDICINE

## 2025-06-30 PROCEDURE — 1159F MED LIST DOCD IN RCRD: CPT | Performed by: INTERNAL MEDICINE

## 2025-06-30 PROCEDURE — G8417 CALC BMI ABV UP PARAM F/U: HCPCS | Performed by: INTERNAL MEDICINE

## 2025-06-30 PROCEDURE — 3079F DIAST BP 80-89 MM HG: CPT | Performed by: INTERNAL MEDICINE

## 2025-06-30 PROCEDURE — 1036F TOBACCO NON-USER: CPT | Performed by: INTERNAL MEDICINE

## 2025-06-30 PROCEDURE — G0439 PPPS, SUBSEQ VISIT: HCPCS | Performed by: INTERNAL MEDICINE

## 2025-06-30 PROCEDURE — 1125F AMNT PAIN NOTED PAIN PRSNT: CPT | Performed by: INTERNAL MEDICINE

## 2025-06-30 PROCEDURE — 99214 OFFICE O/P EST MOD 30 MIN: CPT | Performed by: INTERNAL MEDICINE

## 2025-06-30 PROCEDURE — G8427 DOCREV CUR MEDS BY ELIG CLIN: HCPCS | Performed by: INTERNAL MEDICINE

## 2025-06-30 NOTE — PROGRESS NOTES
Medicare Annual Wellness Visit    Ander Yepez is here for Medicare AWV    Assessment & Plan   Medicare annual wellness visit, subsequent  -     ESTABLISHED, MOD MDM, 30-39 MIN [43127]  Encounter for screening for malignant neoplasm of prostate  -     PSA Screening; Future  -     ESTABLISHED, MOD MDM, 30-39 MIN [99240]  Primary hypertension-blood pressure well-controlled on current meds and will continue those.  -     ESTABLISHED, MOD MDM, 30-39 MIN [[id number]]  Atrial tachycardia-no recurrence.  Continue follow-up with cardiology.  -     Comprehensive Metabolic Panel; Future  -     CBC with Auto Differential; Future  -     Lipid Panel; Future  -     TSH; Future  -     ESTABLISHED, MOD MDM, 30-39 MIN [[id number]]  Impaired fasting glucose-repeat blood sugar.  If elevated, check A1c.  -     ESTABLISHED, MOD MDM, 30-39 MIN [[id number]]  Mixed hyperlipidemia-LDL goal of 100.  Check labs to be sure that is met.  -     Lipid Panel; Future  -     ESTABLISHED, MOD MDM, 30-39 MIN [[id number]]  Localized osteoarthritis of right shoulder-Per orthopedics  -     ESTABLISHED, MOD MDM, 30-39 MIN [[id number]]       Return in 1 year (on 6/30/2026) for Medicare AWV.     Subjective   Presents for a wellness exam as well as a follow-up.  Overall has been feeling well.  He is back to doing exercising.  Does have a skin cancer on his shin that is going to have to be removed.  He is also noted some decreased hair growth particularly under his arms.  No headache.  No dizziness.  No nosebleeds.  No change in bowel or bladder habits.    Patient's complete Health Risk Assessment and screening values have been reviewed and are found in Flowsheets. The following problems were reviewed today and where indicated follow up appointments were made and/or referrals ordered.    Positive Risk Factor Screenings with Interventions:             General HRA Questions:  Select all that apply: (!) (Proxy-Rptd) New or Increased Pain  Interventions - Pain:  See AVS for

## 2025-07-01 LAB
ALBUMIN SERPL-MCNC: 3.9 G/DL (ref 3.5–5)
ALBUMIN/GLOB SERPL: 1.4 (ref 1.1–2.2)
ALP SERPL-CCNC: 62 U/L (ref 45–117)
ALT SERPL-CCNC: 30 U/L (ref 12–78)
ANION GAP SERPL CALC-SCNC: 5 MMOL/L (ref 2–12)
AST SERPL-CCNC: 28 U/L (ref 15–37)
BASOPHILS # BLD: 0.05 K/UL (ref 0–0.1)
BASOPHILS NFR BLD: 0.7 % (ref 0–1)
BILIRUB SERPL-MCNC: 0.4 MG/DL (ref 0.2–1)
BUN SERPL-MCNC: 12 MG/DL (ref 6–20)
BUN/CREAT SERPL: 12 (ref 12–20)
CALCIUM SERPL-MCNC: 9.4 MG/DL (ref 8.5–10.1)
CHLORIDE SERPL-SCNC: 105 MMOL/L (ref 97–108)
CHOLEST SERPL-MCNC: 165 MG/DL
CO2 SERPL-SCNC: 28 MMOL/L (ref 21–32)
CREAT SERPL-MCNC: 0.97 MG/DL (ref 0.7–1.3)
DIFFERENTIAL METHOD BLD: NORMAL
EOSINOPHIL # BLD: 0.13 K/UL (ref 0–0.4)
EOSINOPHIL NFR BLD: 1.9 % (ref 0–7)
ERYTHROCYTE [DISTWIDTH] IN BLOOD BY AUTOMATED COUNT: 14.5 % (ref 11.5–14.5)
GLOBULIN SER CALC-MCNC: 2.7 G/DL (ref 2–4)
GLUCOSE SERPL-MCNC: 90 MG/DL (ref 65–100)
HCT VFR BLD AUTO: 44.8 % (ref 36.6–50.3)
HDLC SERPL-MCNC: 70 MG/DL
HDLC SERPL: 2.4 (ref 0–5)
HGB BLD-MCNC: 13.9 G/DL (ref 12.1–17)
IMM GRANULOCYTES # BLD AUTO: 0.03 K/UL (ref 0–0.04)
IMM GRANULOCYTES NFR BLD AUTO: 0.4 % (ref 0–0.5)
LDLC SERPL CALC-MCNC: 85.4 MG/DL (ref 0–100)
LYMPHOCYTES # BLD: 1.28 K/UL (ref 0.8–3.5)
LYMPHOCYTES NFR BLD: 18.4 % (ref 12–49)
MCH RBC QN AUTO: 27.5 PG (ref 26–34)
MCHC RBC AUTO-ENTMCNC: 31 G/DL (ref 30–36.5)
MCV RBC AUTO: 88.7 FL (ref 80–99)
MONOCYTES # BLD: 0.61 K/UL (ref 0–1)
MONOCYTES NFR BLD: 8.8 % (ref 5–13)
NEUTS SEG # BLD: 4.84 K/UL (ref 1.8–8)
NEUTS SEG NFR BLD: 69.8 % (ref 32–75)
NRBC # BLD: 0 K/UL (ref 0–0.01)
NRBC BLD-RTO: 0 PER 100 WBC
PLATELET # BLD AUTO: 334 K/UL (ref 150–400)
PMV BLD AUTO: 11.8 FL (ref 8.9–12.9)
POTASSIUM SERPL-SCNC: 4.4 MMOL/L (ref 3.5–5.1)
PROT SERPL-MCNC: 6.6 G/DL (ref 6.4–8.2)
PSA SERPL-MCNC: 0.9 NG/ML (ref 0.01–4)
RBC # BLD AUTO: 5.05 M/UL (ref 4.1–5.7)
SODIUM SERPL-SCNC: 138 MMOL/L (ref 136–145)
TRIGL SERPL-MCNC: 48 MG/DL
TSH SERPL DL<=0.05 MIU/L-ACNC: 1.37 UIU/ML (ref 0.36–3.74)
VLDLC SERPL CALC-MCNC: 9.6 MG/DL
WBC # BLD AUTO: 6.9 K/UL (ref 4.1–11.1)

## 2025-07-02 ENCOUNTER — RESULTS FOLLOW-UP (OUTPATIENT)
Age: 75
End: 2025-07-02

## 2025-07-17 ENCOUNTER — OFFICE VISIT (OUTPATIENT)
Age: 75
End: 2025-07-17

## 2025-07-17 VITALS
WEIGHT: 186.4 LBS | HEART RATE: 52 BPM | DIASTOLIC BLOOD PRESSURE: 80 MMHG | HEIGHT: 66 IN | BODY MASS INDEX: 29.96 KG/M2 | SYSTOLIC BLOOD PRESSURE: 130 MMHG | OXYGEN SATURATION: 96 %

## 2025-07-17 DIAGNOSIS — I10 PRIMARY HYPERTENSION: ICD-10-CM

## 2025-07-17 DIAGNOSIS — I48.0 PAROXYSMAL ATRIAL FIBRILLATION (HCC): ICD-10-CM

## 2025-07-17 DIAGNOSIS — Z95.818 STATUS POST BLALOCK-TAUSSIG SHUNT: ICD-10-CM

## 2025-07-17 DIAGNOSIS — Z79.899 ON AMIODARONE THERAPY: Primary | ICD-10-CM

## 2025-07-17 DIAGNOSIS — I48.92 FLUTTER-FIBRILLATION (HCC): ICD-10-CM

## 2025-07-17 DIAGNOSIS — Z95.818 PRESENCE OF WATCHMAN LEFT ATRIAL APPENDAGE CLOSURE DEVICE: ICD-10-CM

## 2025-07-17 DIAGNOSIS — I47.19 ATRIAL TACHYCARDIA: ICD-10-CM

## 2025-07-17 DIAGNOSIS — I48.91 FLUTTER-FIBRILLATION (HCC): ICD-10-CM

## 2025-07-17 DIAGNOSIS — I48.91 ATRIAL FIBRILLATION, UNSPECIFIED TYPE (HCC): ICD-10-CM

## 2025-07-17 RX ORDER — ASPIRIN 325 MG
TABLET ORAL
COMMUNITY

## 2025-07-17 ASSESSMENT — PATIENT HEALTH QUESTIONNAIRE - PHQ9
SUM OF ALL RESPONSES TO PHQ QUESTIONS 1-9: 0
2. FEELING DOWN, DEPRESSED OR HOPELESS: NOT AT ALL
SUM OF ALL RESPONSES TO PHQ QUESTIONS 1-9: 0
1. LITTLE INTEREST OR PLEASURE IN DOING THINGS: NOT AT ALL
SUM OF ALL RESPONSES TO PHQ QUESTIONS 1-9: 0
SUM OF ALL RESPONSES TO PHQ QUESTIONS 1-9: 0

## 2025-07-17 NOTE — PATIENT INSTRUCTIONS
Follow up in 6 months and have a stress test, labs and a chest xray 1 week prior    You will be scheduled for a Nuclear Stress Test after your appointment today.    Nuclear stress testing evaluates blood flow to your heart muscle and assesses cardiac function. There are 2 parts (Rest/Stress) to this procedure and will include either an exercise on a treadmill or an IV administration of a stressing medication called Lexiscan. Your cardiologist will determine which type of testing is best for you. This test can be performed in one day unless it is determined that better quality images will be obtained by performing the test over two days.     *Please arrive 15 minutes prior to your appointment time    Test Duration:    -One day testing will take 4 hours   -Two day testing will take 2 hours each day. Your second day(resting images) will be scheduled after the first day is completed    Day of testing instructions:    NO CAFFEINE (not even decaffeinated products) 24 HOURS PRIOR TO TESTING. This includes coffee, soda, tea, chocolate, multivitamins, and migraine medication, like Excedrin or Fioricet that contains caffeine.  Nothing to eat or drink 4 HOURS prior to testing  NO NICOTINE 12 hours prior to testing  Hold any medications requested by your cardiologist. Otherwise take medications as directed with a few sips of water. If you are unsure you may bring your medications with you to take after instructed by your stressing nurse. It is recommended you hold NONE of your medications prior to your test. DIABETIC PATIENTS: Take half of your insulin with a light meal 4 hours before your test.  Wear comfortable clothes and shoes (Shirts with no metal, shorts or pants, tennis shoes, no heels or flip flops)    IMPORTANT: This testing involves a cardiac tracer ordered specifically for you. If you are unable to make your appointment, please call to cancel/reschedule AT LEAST 24 hours prior to your appointment so your tracer can

## 2025-07-17 NOTE — PROGRESS NOTES
amiodarone.     Okay liver function test and TSH  in 6/25 repeating 6-month        CXR ok on 1/25 5     We have discussed the potential for underlying conduction disease and the potential need for pacemaker in the future.  He will let me know if any symptoms.       Eye examination yearly.     2.  Cardiomyopathy: Resolved as per MARCUS in October 2024 with ejection fraction 55 to 60%.     We will continue OFF metoprolol and losartan.     3.  CAD: Previous abnormal calcium score.(203 on 2019)  Nuclear stress test in December 2021 essentially with no ischemia.    Given mild coronary disease proceed with follow-up nuclear stress test the next office visit.     4.  Hypertension: Well-controlled     5.  Hyperlipidemia: Closely followed by his primary care physician.     5.  Status post left total knee replacement in the fall 2022    We have discussed potential for atrial fibrillation ablation if recurrent atrial fibrillation for inability to continue amiodarone.  For now this will be on the back burner.    I offered to him to proceed with the EP evaluation he wants to hold off at this point.     Otherwise I will see him back in 6 months with liver function test and TSH 1 week prior.  Also nuclear stress test 1 week prior         HPI     75 y.o. male.Patient with h/o HTN seen here for evaluation of HTN and bradycardia  ECHO on 4/17:Left ventricle: Systolic function was normal. Ejection fraction was  estimated in the range of 55 % to 60 %. There were no regional wall motion  abnormalities.    Mitral valve: There was mild regurgitation.    Aortic valve: Normal valve structure. The valve was trileaflet. Leaflets  exhibited mildly increased thickness, normal cuspal separation, and  sclerosis.    Tricuspid valve: There was mild regurgitation.  HOLTER on 5/9/17: sinus bradycardia  with average of 58 frequent pacs and rare pvcs, 8 episodes of possible PAT with the longest of 31 beats at 165     Ca score on 6/19:Left main

## 2025-08-01 ENCOUNTER — TELEPHONE (OUTPATIENT)
Age: 75
End: 2025-08-01

## 2025-08-27 ENCOUNTER — OFFICE VISIT (OUTPATIENT)
Age: 75
End: 2025-08-27
Payer: MEDICARE

## 2025-08-27 VITALS
HEIGHT: 66 IN | BODY MASS INDEX: 29.96 KG/M2 | OXYGEN SATURATION: 96 % | SYSTOLIC BLOOD PRESSURE: 120 MMHG | WEIGHT: 186.4 LBS | DIASTOLIC BLOOD PRESSURE: 76 MMHG | HEART RATE: 51 BPM

## 2025-08-27 DIAGNOSIS — I48.0 PAROXYSMAL ATRIAL FIBRILLATION (HCC): Primary | ICD-10-CM

## 2025-08-27 PROCEDURE — G8417 CALC BMI ABV UP PARAM F/U: HCPCS | Performed by: INTERNAL MEDICINE

## 2025-08-27 PROCEDURE — 1126F AMNT PAIN NOTED NONE PRSNT: CPT | Performed by: INTERNAL MEDICINE

## 2025-08-27 PROCEDURE — 1036F TOBACCO NON-USER: CPT | Performed by: INTERNAL MEDICINE

## 2025-08-27 PROCEDURE — 99214 OFFICE O/P EST MOD 30 MIN: CPT | Performed by: INTERNAL MEDICINE

## 2025-08-27 PROCEDURE — G8427 DOCREV CUR MEDS BY ELIG CLIN: HCPCS | Performed by: INTERNAL MEDICINE

## 2025-08-27 PROCEDURE — 3078F DIAST BP <80 MM HG: CPT | Performed by: INTERNAL MEDICINE

## 2025-08-27 PROCEDURE — 3074F SYST BP LT 130 MM HG: CPT | Performed by: INTERNAL MEDICINE

## 2025-08-27 PROCEDURE — 1159F MED LIST DOCD IN RCRD: CPT | Performed by: INTERNAL MEDICINE

## 2025-08-27 PROCEDURE — 1123F ACP DISCUSS/DSCN MKR DOCD: CPT | Performed by: INTERNAL MEDICINE

## 2025-08-27 RX ORDER — VITAMIN B COMPLEX
1 CAPSULE ORAL DAILY
COMMUNITY

## 2025-08-27 ASSESSMENT — PATIENT HEALTH QUESTIONNAIRE - PHQ9
SUM OF ALL RESPONSES TO PHQ QUESTIONS 1-9: 0
2. FEELING DOWN, DEPRESSED OR HOPELESS: NOT AT ALL
SUM OF ALL RESPONSES TO PHQ QUESTIONS 1-9: 0
SUM OF ALL RESPONSES TO PHQ QUESTIONS 1-9: 0
1. LITTLE INTEREST OR PLEASURE IN DOING THINGS: NOT AT ALL
SUM OF ALL RESPONSES TO PHQ QUESTIONS 1-9: 0

## 2025-09-03 RX ORDER — HYDROCHLOROTHIAZIDE 25 MG/1
TABLET ORAL
Qty: 45 TABLET | Refills: 1 | Status: SHIPPED | OUTPATIENT
Start: 2025-09-03

## (undated) DEVICE — DRESSING HYDROFIBER AQUACEL AG ADVANTAGE 3.5X12 IN

## (undated) DEVICE — 450 ML BOTTLE OF 0.05% CHLORHEXIDINE GLUCONATE IN 99.95% STERILE WATER FOR IRRIGATION, USP AND APPLICATOR.: Brand: IRRISEPT ANTIMICROBIAL WOUND LAVAGE

## (undated) DEVICE — SPONGE GZ W4XL4IN COT 12 PLY TYP VII WVN C FLD DSGN STERILE

## (undated) DEVICE — DRILL KIT RVS 3.2 MM ERGO DISP

## (undated) DEVICE — Device

## (undated) DEVICE — SUTURE VCRL SZ 1 L36IN ABSRB UD L36MM CT-1 1/2 CIR J947H

## (undated) DEVICE — PACK PROCEDURE SURG HRT CATH

## (undated) DEVICE — GOWN,SIRUS,NONRNF,SETINSLV,2XL,18/CS: Brand: MEDLINE

## (undated) DEVICE — TUBING SUCT 12FR MAL ALUM SHFT FN CAP VENT UNIV CONN W/ OBT

## (undated) DEVICE — MTS LEFT HEART KIT ST MARY'S RICHMOND: Brand: NAMIC

## (undated) DEVICE — IMMOBILIZER KNEE CUTAWAY 24 IN

## (undated) DEVICE — SOLUTION SURG PREP 26 CC PURPREP

## (undated) DEVICE — TOTAL JOINT - SMH: Brand: MEDLINE INDUSTRIES, INC.

## (undated) DEVICE — SOLUTION IRRIG 3000ML 0.9% SOD CHL USP UROMATIC PLAS CONT

## (undated) DEVICE — SUTURE ETHBND EXCEL SZ 1 L30IN NONABSORBABLE GRN L36MM CT-1 X425H

## (undated) DEVICE — PENCIL ES L10FT COAT BLDE HOLSTER

## (undated) DEVICE — 3M™ STERI-DRAPE™ U-DRAPE 1015: Brand: STERI-DRAPE™

## (undated) DEVICE — 2108 SERIES SAGITTAL BLADE AGGRESSIVE  (25.0 X 1.19 X 85.0MM)

## (undated) DEVICE — SUTURE ETHBND EXCEL SZ 2 L30IN NONABSORBABLE GRN L40MM V-37 MX69G

## (undated) DEVICE — COVER,LIGHT HANDLE,FLX,1/PK: Brand: MEDLINE INDUSTRIES, INC.

## (undated) DEVICE — 1 X VERSACROSS TRANSSEPTAL SHEATH (INCLUDING  1 X J-TIP GUIDEWIRE); 1 X VERSACROSS RF WIRE (INCLUDING 1 X CONNECTOR CABLE (SINGLE USE)); 1 X DISPERSIVE ELECTRODE: Brand: VERSACROSS ACCESS SOLUTION

## (undated) DEVICE — GLOVE SURG SZ 65 L12IN FNGR THK79MIL GRN LTX FREE

## (undated) DEVICE — SUTURE VICRYL ABSORBABLE BRAIDED 2-0 CT 36 IN DA UD  VCP957H

## (undated) DEVICE — PACK SURG PROC KNEE USER GPS

## (undated) DEVICE — HOOD, PEEL-AWAY: Brand: FLYTE

## (undated) DEVICE — SYRINGE 20ML LL S/C 50

## (undated) DEVICE — SET HNDPC W COAX FAN SPR TIP AND SUCT TB INTERPULSE

## (undated) DEVICE — SYRINGE MEDICAL 3ML CLEAR PLASTIC STANDARD NON CONTROL LUERLOCK TIP DISPOSABLE

## (undated) DEVICE — BLUNTFILL: Brand: MONOJECT

## (undated) DEVICE — CATHETER ULTRASOUND NUVISION ICE OD10 FR

## (undated) DEVICE — ELECTRODE PT RET AD L9FT HI MOIST COND ADH HYDRGEL CORDED

## (undated) DEVICE — SUTURE MCRYL SZ 4-0 L27IN ABSRB UD L24MM PS-1 3/8 CIR PRIM Y935H

## (undated) DEVICE — SUTURE VCRL SZ 2-0 L36IN ABSRB UD L40MM CT 1/2 CIR J957H

## (undated) DEVICE — PAD,ABDOMINAL,5"X9",ST,LF,25/BX: Brand: MEDLINE INDUSTRIES, INC.

## (undated) DEVICE — 3M™ TEGADERM™ TRANSPARENT FILM DRESSING FRAME STYLE, 1626W, 4 IN X 4-3/4 IN (10 CM X 12 CM), 50/CT 4CT/CASE: Brand: 3M™ TEGADERM™

## (undated) DEVICE — COVER CATH ACUNAV ULTRASOUND 5X72IN ANTI STATIC

## (undated) DEVICE — BIT DRL L145MM DIA3.2MM QUIK CPL W/O STP REUSE

## (undated) DEVICE — HYPODERMIC SAFETY NEEDLE: Brand: MAGELLAN

## (undated) DEVICE — C-WIRE PAK DOUBLE ENDED ORTHOPAEDIC WIRE, TROCAR, .062" (1.57 MM): Brand: C-WIRE

## (undated) DEVICE — STRIP,CLOSURE,WOUND,MEDI-STRIP,1/2X4: Brand: MEDLINE

## (undated) DEVICE — MARKER SURG SKIN UTIL BLK REG TIP NONSMEARING W/ 6IN RUL

## (undated) DEVICE — GLOVE ORTHO 8   MSG9480

## (undated) DEVICE — 2108 SERIES SAGITTAL BLADE (18.6 X 0.8 X 73.8MM)

## (undated) DEVICE — SCRUBIN SCRUB BRUSH DRY STER: Brand: MEDLINE INDUSTRIES, INC.

## (undated) DEVICE — SUTURE VICRYL + SZ 0 L27IN ANTIBACTERIAL POLYGLACTIN 910 W VCP280H

## (undated) DEVICE — SYRINGE, LUER LOCK, 10ML: Brand: MEDLINE

## (undated) DEVICE — COVER,MAYO STAND,STERILE: Brand: MEDLINE

## (undated) DEVICE — HANDPIECE SET WITH BONE CLEANING TIP AND SUCTION TUBE: Brand: INTERPULSE

## (undated) DEVICE — PERCLOSE PROGLIDE™ SUTURE-MEDIATED CLOSURE SYSTEM: Brand: PERCLOSE PROGLIDE™

## (undated) DEVICE — SOLUTION SCRB 2OZ 10% POVIDONE IOD ANTIMIC BTL

## (undated) DEVICE — PADPRO DEFIBRILLATION/PACING/CARDIOVERSION/MONITORING ELECTRODES, ADULT/CHILD GREATER THAN 10 KG RADIOTRANSPARENT ELECTRODE, PHYSIO-CONTROL QUIK-COMBO (M) 60" (152 CM): Brand: PADPRO

## (undated) DEVICE — PINNACLE INTRODUCER SHEATH: Brand: PINNACLE

## (undated) DEVICE — IMMOBILIZER SHLDR M UNIV 65X17IN BLK LIGHWEIGHT PCH SZ REUSE

## (undated) DEVICE — INTRODUCER SHTH 11FR CANN L23CM DIL TIP 45MM YEL W/O MINI

## (undated) DEVICE — DRESSING HYDROFIBER AQUACEL AG ADVANTAGE 3.5X14 IN

## (undated) DEVICE — GLOVE SURG SZ 65 L12IN FNGR THK94MIL STD WHT LTX FREE

## (undated) DEVICE — CONTAINER,SPECIMEN,3OZ,OR STRL: Brand: MEDLINE

## (undated) DEVICE — GLOVE ORANGE PI 8 1/2   MSG9085

## (undated) DEVICE — SUTURE VICRYL COATED ABSORBABLE BRAIDED 2-0 TP1 54 IN COAT UD VICRYL + VCP880T

## (undated) DEVICE — WASTE KIT - ST MARY: Brand: MEDLINE INDUSTRIES, INC.

## (undated) DEVICE — DRAPE,U/ SHT,SPLIT,PLAS,STERIL: Brand: MEDLINE

## (undated) DEVICE — PAD BD MATTRESS 73X32 IN STD CONVOLUTED FOAM LTX FREE

## (undated) DEVICE — BLADE SAW W098XL354IN THK0047IN CUT THK0047IN SAG FLR

## (undated) DEVICE — DRESSING STERILE PETRO W3XL8IN N ADH OIL EMUL GZ CURAD

## (undated) DEVICE — CATHETER ETER DIAG L110CM OD6FR VASC PGTL W SIDE H COR W OUT

## (undated) DEVICE — SCRUB DRY SURG EZ SCRUB BRUSH PREOPERATIVE GRN

## (undated) DEVICE — SUTURE FIBERWIRE SZ 2 W/ TAPERED NEEDLE BLUE L38IN NONABSORB BLU L26.5MM 1/2 CIRCLE AR7200

## (undated) DEVICE — PROVE COVER: Brand: UNBRANDED

## (undated) DEVICE — GARMENT,MEDLINE,DVT,INT,CALF,MED, GEN2: Brand: MEDLINE

## (undated) DEVICE — ACCESS SHEATH WITH DILATOR: Brand: WATCHMAN FXD CURVE™ ACCESS SYSTEM

## (undated) DEVICE — 4-PORT MANIFOLD: Brand: NEPTUNE 2